# Patient Record
Sex: MALE | Race: WHITE | NOT HISPANIC OR LATINO | Employment: OTHER | URBAN - METROPOLITAN AREA
[De-identification: names, ages, dates, MRNs, and addresses within clinical notes are randomized per-mention and may not be internally consistent; named-entity substitution may affect disease eponyms.]

---

## 2017-01-10 ENCOUNTER — APPOINTMENT (OUTPATIENT)
Dept: LAB | Facility: CLINIC | Age: 72
End: 2017-01-10
Payer: MEDICARE

## 2017-01-10 ENCOUNTER — GENERIC CONVERSION - ENCOUNTER (OUTPATIENT)
Dept: OTHER | Facility: OTHER | Age: 72
End: 2017-01-10

## 2017-01-10 DIAGNOSIS — E87.6 HYPOKALEMIA: ICD-10-CM

## 2017-01-10 LAB — POTASSIUM SERPL-SCNC: 3.6 MMOL/L (ref 3.5–5.3)

## 2017-01-10 PROCEDURE — 84132 ASSAY OF SERUM POTASSIUM: CPT

## 2017-01-10 PROCEDURE — 36415 COLL VENOUS BLD VENIPUNCTURE: CPT

## 2017-01-19 ENCOUNTER — ALLSCRIPTS OFFICE VISIT (OUTPATIENT)
Dept: OTHER | Facility: OTHER | Age: 72
End: 2017-01-19

## 2017-05-24 ENCOUNTER — HOSPITAL ENCOUNTER (OUTPATIENT)
Dept: NON INVASIVE DIAGNOSTICS | Facility: CLINIC | Age: 72
Discharge: HOME | End: 2017-05-24
Payer: MEDICARE

## 2017-05-24 DIAGNOSIS — I65.21 OCCLUSION AND STENOSIS OF RIGHT CAROTID ARTERY: ICD-10-CM

## 2017-05-24 PROCEDURE — 93880 EXTRACRANIAL BILAT STUDY: CPT

## 2017-07-21 ENCOUNTER — GENERIC CONVERSION - ENCOUNTER (OUTPATIENT)
Dept: OTHER | Facility: OTHER | Age: 72
End: 2017-07-21

## 2017-07-21 ENCOUNTER — TRANSCRIBE ORDERS (OUTPATIENT)
Dept: LAB | Facility: CLINIC | Age: 72
End: 2017-07-21

## 2017-07-21 ENCOUNTER — APPOINTMENT (OUTPATIENT)
Dept: LAB | Facility: CLINIC | Age: 72
End: 2017-07-21
Payer: MEDICARE

## 2017-07-21 DIAGNOSIS — I10 ESSENTIAL (PRIMARY) HYPERTENSION: ICD-10-CM

## 2017-07-21 DIAGNOSIS — I10 ESSENTIAL HYPERTENSION, MALIGNANT: Primary | ICD-10-CM

## 2017-07-21 DIAGNOSIS — I10 ESSENTIAL HYPERTENSION, MALIGNANT: ICD-10-CM

## 2017-07-21 DIAGNOSIS — E87.6 HYPOPOTASSEMIA: ICD-10-CM

## 2017-07-21 DIAGNOSIS — E87.6 HYPOKALEMIA: ICD-10-CM

## 2017-07-21 LAB
ANION GAP SERPL CALCULATED.3IONS-SCNC: 7 MMOL/L (ref 4–13)
BUN SERPL-MCNC: 22 MG/DL (ref 5–25)
CALCIUM SERPL-MCNC: 9.2 MG/DL (ref 8.3–10.1)
CHLORIDE SERPL-SCNC: 103 MMOL/L (ref 100–108)
CO2 SERPL-SCNC: 29 MMOL/L (ref 21–32)
CREAT SERPL-MCNC: 0.98 MG/DL (ref 0.6–1.3)
GFR SERPL CREATININE-BSD FRML MDRD: >60 ML/MIN/1.73SQ M
GLUCOSE P FAST SERPL-MCNC: 86 MG/DL (ref 65–99)
POTASSIUM SERPL-SCNC: 3.7 MMOL/L (ref 3.5–5.3)
SODIUM SERPL-SCNC: 139 MMOL/L (ref 136–145)

## 2017-07-21 PROCEDURE — 80048 BASIC METABOLIC PNL TOTAL CA: CPT

## 2017-08-03 ENCOUNTER — ALLSCRIPTS OFFICE VISIT (OUTPATIENT)
Dept: OTHER | Facility: OTHER | Age: 72
End: 2017-08-03

## 2017-09-19 ENCOUNTER — ALLSCRIPTS OFFICE VISIT (OUTPATIENT)
Dept: OTHER | Facility: OTHER | Age: 72
End: 2017-09-19

## 2017-10-04 ENCOUNTER — GENERIC CONVERSION - ENCOUNTER (OUTPATIENT)
Dept: OTHER | Facility: OTHER | Age: 72
End: 2017-10-04

## 2017-12-04 DIAGNOSIS — E78.00 PURE HYPERCHOLESTEROLEMIA: ICD-10-CM

## 2017-12-04 DIAGNOSIS — Z12.5 ENCOUNTER FOR SCREENING FOR MALIGNANT NEOPLASM OF PROSTATE: ICD-10-CM

## 2017-12-04 DIAGNOSIS — I10 ESSENTIAL (PRIMARY) HYPERTENSION: ICD-10-CM

## 2017-12-04 DIAGNOSIS — I65.21 OCCLUSION AND STENOSIS OF RIGHT CAROTID ARTERY: ICD-10-CM

## 2018-01-09 NOTE — RESULT NOTES
Verified Results  (1) BASIC METABOLIC PROFILE [id number] 07:35AM [employer] Order Number: [id number]     Test Name Result Flag Reference   SODIUM 139 mmol/L  136-145   POTASSIUM 3 7 mmol/L  3 5-5 3   CHLORIDE 103 mmol/L  100-108   CARBON DIOXIDE 29 mmol/L  21-32   ANION GAP (CALC) 7 mmol/L  4-13   BLOOD UREA NITROGEN 22 mg/dL  5-25   CREATININE 0 98 mg/dL  0 60-1 30   Standardized to IDMS reference method   CALCIUM 9 2 mg/dL  8 3-10 1   eGFR Non-African American      >60 0 ml/min/1 73sq m   [facility] Disease Education Program recommendations are as follows:  GFR calculation is accurate only with a steady state creatinine  Chronic Kidney disease less than 60 ml/min/1 73 sq  meters  Kidney failure less than 15 ml/min/1 73 sq  meters     GLUCOSE FASTING 86 mg/dL  65-99

## 2018-01-10 NOTE — PROGRESS NOTES
Chief Complaint  Patient here for High dose Fluzone in left arm      Active Problems    1  Benign essential hypertension (401 1) (I10)   2  Carotid stenosis, asymptomatic, right (433 10) (I65 21)   3  Colon cancer screening (V76 51) (Z12 11)   4  Elevated BP (401 9) (I10)   5  First degree AV block (426 11) (I44 0)   6  History of allergy (V15 09) (Z88 9)   7  Hypercholesterolemia (272 0) (E78 0)   8  Need for influenza vaccination (V04 81) (Z23)   9  Obesity (278 00) (E66 9)   10  Osteoarthritis (715 90) (M19 90)    Current Meds   1  Aspirin 81 MG TABS; TAKE 1 TABLET DAILY; Therapy: 62OTS4199 to (Evaluate:02Apr2016) Recorded   2  Chlorthalidone 25 MG Oral Tablet; TAKE 1 TABLET DAILY; Therapy: 74RFU1349 to (Evaluate:10Jan2017)  Requested for: 97ZKZ2699; Last   Rx:35Nvx3490 Ordered   3  Losartan Potassium 50 MG Oral Tablet; take 1 tablet every day; Therapy: 36SWY2148 to (Terie Salvage)  Requested for: 02MJD8224; Last   Rx:25Sli3126 Ordered   4  Simvastatin 40 MG Oral Tablet; take 1 tablet by mouth every day; Therapy: 32GTO7456 to (Evaluate:29Oct2016)  Requested for: 17YHB9931; Last   DM:91HSU6191 Ordered    Allergies    1  No Known Drug Allergies    Plan  Need for influenza vaccination    · Fluzone High-Dose 0 5 ML Intramuscular Suspension Prefilled Syringe    Future Appointments    Date/Time Provider Specialty Site   01/19/2017 01:00 PM GRAY Ghotra  Family Medicine [facility]     Signatures   Electronically signed by : Belvia Spurling, ; Sep  9 2016  8:50AM EST                       (Author)    Electronically signed by :  Mordechai Nyhan, MD; Sep  9 2016 10:14AM EST                       (Review)

## 2018-01-11 NOTE — RESULT NOTES
Message   Please call - I have tried a few times and missed      Carotids show some narrowing but not significant   He is on aspirin, statin, and BP is good   Nothing else to do at present   Will reveiw again at his next visit   Will recheck again in a couple of years     Verified Results  VAS CAROTID COMPLETE STUDY 76Gaj8123 08:27AM Gurpreet Pritchett Order Number: [id number]   Performing Comments: He had < 50% right occlusion on screening last year and it was recommended he have a complete study in 1 year   - Patient Instructions: To schedule this appointment, please contact Central Scheduling at [phone]   Order Number: [id number]   Performing Comments: He had < 50% right occlusion on screening last year and it was recommended he have a complete study in 1 year   - Patient Instructions: To schedule this appointment, please contact Central Scheduling at [phone]  Test Name Result Flag Reference   VAS CAROTID COMPLETE STUDY (Report)     [facility] REPORT   CLINICAL:   Indications: Carotid disease w/o CVA [I65 29]  follow up from screening denies   current symptoms   Operative History   no vascular surgery   Risk Factors   The patient has history of HTN and hyperlipidemia  Clinical   Right Pressure: 140/70 mm Hg, Left Pressure: 140/70 mm Hg  FINDINGS:      Right    Impression PSV EDV (cm/s) Ratio    Dist  ICA         55     20  0 66    Mid  ICA         61     25  0 72    Prox  ICA  1 - 49%   59     21  0 71    Dist CCA         84     23        Mid CCA          84     25  0 98    Prox CCA         85     22        Ext Carotid        82     15  0 98    Prox Vert         44     15        Subclavian        113      0           Left     Impression PSV EDV (cm/s) Ratio    Dist  ICA         68     30  0 86    Mid  ICA         71     25  0 91    Prox   ICA  1 - 49%   75     20  0 95    Dist CCA         72     22        Mid CCA          78     24  0 94    Prox CCA         83     21 Ext Carotid        84     14  1 08    Prox Vert         40     12        Subclavian        162      0                 CONCLUSION:   Impression   RIGHT:   There is <50% stenosis noted in the internal carotid artery  Plaque is   heterogenous and smooth  Vertebral artery flow is antegrade  There is no significant subclavian artery   disease  LEFT:   There is <50% stenosis noted in the internal carotid artery  Plaque is   heterogenous and smooth  Vertebral artery flow is antegrade  There is no significant subclavian artery   disease  Compared to previous study on 7/14/2015 , there is no progression of arterial   disease in the carotid arteries bilaterally  Internal carotid artery stenosis determination by consensus criteria from:   Alena Malhotra et al  Carotid Artery Stenosis: Gray-Scale and Doppler US Diagnosis   - Society of Radiologists in [address] [place] [address], Radiology 2003;   [phone]        SIGNATURE:   Electronically Signed by: Sue Jackson MD on 2016-08-13 12:40:45 PM

## 2018-01-11 NOTE — RESULT NOTES
Message   Labs generally OK except slightly low potassium level which is from his water pill   Increase dietart potassium and recheck his potassium level before visit 1/19/17     Verified Results  (1) COMPREHENSIVE METABOLIC PANEL [id number] 14:23SS Harshil Gutierrez Order Number: [id number]     Test Name Result Flag Reference   GLUCOSE,RANDM 83 mg/dL     If the patient is fasting, the ADA then defines impaired fasting glucose as > 100 mg/dL and diabetes as > or equal to 123 mg/dL  SODIUM 143 mmol/L  136-145   POTASSIUM 3 4 mmol/L L 3 5-5 3   CHLORIDE 104 mmol/L  100-108   CARBON DIOXIDE 32 mmol/L  21-32   ANION GAP (CALC) 7 mmol/L  4-13   BLOOD UREA NITROGEN 18 mg/dL  5-25   CREATININE 1 09 mg/dL  0 60-1 30   Standardized to IDMS reference method   CALCIUM 9 1 mg/dL  8 3-10 1   BILI, TOTAL 0 53 mg/dL  0 20-1 00   ALK PHOSPHATAS 58 U/L     ALT (SGPT) 27 U/L  12-78   AST(SGOT) 16 U/L  5-45   ALBUMIN 3 9 g/dL  3 5-5 0   TOTAL PROTEIN 7 3 g/dL  6 4-8 2   eGFR Non-African American      >60 0 ml/min/1 73sq [facility] Disease Education Program recommendations are as follows:  GFR calculation is accurate only with a steady state creatinine  Chronic Kidney disease less than 60 ml/min/1 73 sq  meters  Kidney failure less than 15 ml/min/1 73 sq  meters       (1) CBC/PLT/DIFF [id number] 07:32AM Harshil Gutierrez Order Number: [id number]     Test Name Result Flag Reference   WBC COUNT 6 14 Thousand/uL  4 31-10 16   RBC COUNT 4 76 Million/uL  3 88-5 62   HEMOGLOBIN 14 1 g/dL  12 0-17 0   HEMATOCRIT 42 2 %  36 5-49 3   MCV 89 fL  82-98   MCH 29 6 pg  26 8-34 3   MCHC 33 4 g/dL  31 4-37 4   RDW 12 4 %  11 6-15 1   MPV 9 7 fL  8 9-12 7   PLATELET COUNT 491 Thousands/uL  149-390   nRBC AUTOMATED 0 /100 WBCs     NEUTROPHILS RELATIVE PERCENT 65 %  43-75   LYMPHOCYTES RELATIVE PERCENT 23 %  14-44   MONOCYTES RELATIVE PERCENT 7 %  4-12   EOSINOPHILS RELATIVE PERCENT 5 %  0-6   BASOPHILS RELATIVE PERCENT 0 %  0-1   NEUTROPHILS ABSOLUTE COUNT 3 98 Thousands/?L  1 85-7 62   LYMPHOCYTES ABSOLUTE COUNT 1 39 Thousands/?L  0 60-4 47   MONOCYTES ABSOLUTE COUNT 0 43 Thousand/?L  0 17-1 22   EOSINOPHILS ABSOLUTE COUNT 0 31 Thousand/?L  0 00-0 61   BASOPHILS ABSOLUTE COUNT 0 02 Thousands/?L  0 00-0 10     (1) LIPID PANEL, FASTING [id number] 07:32AM [employer] Order Number: [id number]     Test Name Result Flag Reference   CHOLESTEROL 167 mg/dL     HDL,DIRECT 62 mg/dL H 40-60   Specimen collection should occur prior to Metamizole administration due to the potential for falsely depressed results  LDL CHOLESTEROL CALCULATED 83 mg/dL  0-100   Triglyceride:         Normal              <150 mg/dl       Borderline High    150-199 mg/dl       High               200-499 mg/dl       Very High          >499 mg/dl  Cholesterol:         Desirable        <200 mg/dl      Borderline High  200-239 mg/dl      High             >239 mg/dl  HDL Cholesterol:        High    >59 mg/dL      Low     <41 mg/dL  LDL CALCULATED:    This screening LDL is a calculated result  It does not have the accuracy of the Direct Measured LDL in the monitoring of patients with hyperlipidemia and/or statin therapy  Direct Measure LDL (TJA435) must be ordered separately in these patients  TRIGLYCERIDES 109 mg/dL  <=150   Specimen collection should occur prior to N-Acetylcysteine or Metamizole administration due to the potential for falsely depressed results       (1) MAGNESIUM 12Dec2016 07:32AM [employer] Order Number: [id number]     Test Name Result Flag Reference   MAGNESIUM 2 3 mg/dL  1 6-2 6     (1) URINALYSIS (will reflex a microscopy if leukocytes, occult blood, protein or nitrites are not within normal limits) [id number] 07:32AM [employer] Order Number: [id number]     Test Name Result Flag Reference   COLOR Dk Yellow     CLARITY Cloudy     SPECIFIC GRAVITY UA 1 020  1 003-1 030 PH UA 5 0  4 5-8 0   LEUKOCYTE ESTERASE UA Negative  Negative   NITRITE UA Negative  Negative   PROTEIN UA Negative mg/dl  Negative   GLUCOSE UA Negative mg/dl  Negative   KETONES UA Negative mg/dl  Negative   UROBILINOGEN UA 0 2 E U /dl  0 2, 1 0 E U /dl   BILIRUBIN UA Negative  Negative   BLOOD UA Negative  Negative       Plan  Hypokalemia    · (1) POTASSIUM; Status:Active;  Requested QBT:24ATY5600;

## 2018-01-13 VITALS
HEIGHT: 67 IN | SYSTOLIC BLOOD PRESSURE: 130 MMHG | WEIGHT: 222.13 LBS | HEART RATE: 80 BPM | DIASTOLIC BLOOD PRESSURE: 72 MMHG | TEMPERATURE: 98 F | BODY MASS INDEX: 34.87 KG/M2 | RESPIRATION RATE: 16 BRPM

## 2018-01-13 VITALS
SYSTOLIC BLOOD PRESSURE: 124 MMHG | RESPIRATION RATE: 16 BRPM | DIASTOLIC BLOOD PRESSURE: 80 MMHG | HEART RATE: 84 BPM | WEIGHT: 223.2 LBS | HEIGHT: 67 IN | BODY MASS INDEX: 35.03 KG/M2 | TEMPERATURE: 98 F

## 2018-01-14 VITALS
DIASTOLIC BLOOD PRESSURE: 72 MMHG | WEIGHT: 222 LBS | TEMPERATURE: 98.1 F | HEART RATE: 74 BPM | BODY MASS INDEX: 34.84 KG/M2 | SYSTOLIC BLOOD PRESSURE: 126 MMHG | HEIGHT: 67 IN | RESPIRATION RATE: 16 BRPM

## 2018-01-14 NOTE — RESULT NOTES
Verified Results  (1) POTASSIUM 60TIS7654 09:19AM [email or web address] Order Number: [id number]     Test Name Result Flag Reference   POTASSIUM 3 6 mmol/L  3 5-5 3

## 2018-02-03 ENCOUNTER — APPOINTMENT (OUTPATIENT)
Dept: LAB | Facility: CLINIC | Age: 73
End: 2018-02-03
Payer: MEDICARE

## 2018-02-03 DIAGNOSIS — E78.00 PURE HYPERCHOLESTEROLEMIA: ICD-10-CM

## 2018-02-03 DIAGNOSIS — I10 ESSENTIAL (PRIMARY) HYPERTENSION: ICD-10-CM

## 2018-02-03 DIAGNOSIS — Z12.5 ENCOUNTER FOR SCREENING FOR MALIGNANT NEOPLASM OF PROSTATE: ICD-10-CM

## 2018-02-03 DIAGNOSIS — I65.21 OCCLUSION AND STENOSIS OF RIGHT CAROTID ARTERY: ICD-10-CM

## 2018-02-03 LAB
ALBUMIN SERPL BCP-MCNC: 4 G/DL (ref 3.5–5)
ALP SERPL-CCNC: 51 U/L (ref 46–116)
ALT SERPL W P-5'-P-CCNC: 28 U/L (ref 12–78)
ANION GAP SERPL CALCULATED.3IONS-SCNC: 10 MMOL/L (ref 4–13)
AST SERPL W P-5'-P-CCNC: 19 U/L (ref 5–45)
BILIRUB SERPL-MCNC: 0.49 MG/DL (ref 0.2–1)
BILIRUB UR QL STRIP: NEGATIVE
BUN SERPL-MCNC: 17 MG/DL (ref 5–25)
CALCIUM SERPL-MCNC: 8.8 MG/DL (ref 8.3–10.1)
CHLORIDE SERPL-SCNC: 103 MMOL/L (ref 100–108)
CHOLEST SERPL-MCNC: 140 MG/DL (ref 50–200)
CLARITY UR: CLEAR
CO2 SERPL-SCNC: 30 MMOL/L (ref 21–32)
COLOR UR: NORMAL
CREAT SERPL-MCNC: 1.02 MG/DL (ref 0.6–1.3)
ERYTHROCYTE [DISTWIDTH] IN BLOOD BY AUTOMATED COUNT: 12.7 % (ref 11.6–15.1)
GFR SERPL CREATININE-BSD FRML MDRD: 73 ML/MIN/1.73SQ M
GLUCOSE P FAST SERPL-MCNC: 78 MG/DL (ref 65–99)
GLUCOSE UR STRIP-MCNC: NEGATIVE MG/DL
HCT VFR BLD AUTO: 39.9 % (ref 36.5–49.3)
HDLC SERPL-MCNC: 57 MG/DL (ref 40–60)
HGB BLD-MCNC: 13.2 G/DL (ref 12–17)
HGB UR QL STRIP.AUTO: NEGATIVE
KETONES UR STRIP-MCNC: NEGATIVE MG/DL
LDLC SERPL CALC-MCNC: 68 MG/DL (ref 0–100)
LEUKOCYTE ESTERASE UR QL STRIP: NEGATIVE
MCH RBC QN AUTO: 30.1 PG (ref 26.8–34.3)
MCHC RBC AUTO-ENTMCNC: 33.1 G/DL (ref 31.4–37.4)
MCV RBC AUTO: 91 FL (ref 82–98)
NITRITE UR QL STRIP: NEGATIVE
PH UR STRIP.AUTO: 6 [PH] (ref 4.5–8)
PLATELET # BLD AUTO: 240 THOUSANDS/UL (ref 149–390)
PMV BLD AUTO: 9.4 FL (ref 8.9–12.7)
POTASSIUM SERPL-SCNC: 3.7 MMOL/L (ref 3.5–5.3)
PROT SERPL-MCNC: 7.1 G/DL (ref 6.4–8.2)
PROT UR STRIP-MCNC: NEGATIVE MG/DL
PSA SERPL-MCNC: 1.7 NG/ML (ref 0–4)
RBC # BLD AUTO: 4.38 MILLION/UL (ref 3.88–5.62)
SODIUM SERPL-SCNC: 143 MMOL/L (ref 136–145)
SP GR UR STRIP.AUTO: 1.02 (ref 1–1.03)
TRIGL SERPL-MCNC: 77 MG/DL
UROBILINOGEN UR QL STRIP.AUTO: 1 E.U./DL
WBC # BLD AUTO: 5.44 THOUSAND/UL (ref 4.31–10.16)

## 2018-02-03 PROCEDURE — 81003 URINALYSIS AUTO W/O SCOPE: CPT

## 2018-02-03 PROCEDURE — 36415 COLL VENOUS BLD VENIPUNCTURE: CPT

## 2018-02-03 PROCEDURE — G0103 PSA SCREENING: HCPCS

## 2018-02-03 PROCEDURE — 80061 LIPID PANEL: CPT

## 2018-02-03 PROCEDURE — 80053 COMPREHEN METABOLIC PANEL: CPT

## 2018-02-03 PROCEDURE — 85027 COMPLETE CBC AUTOMATED: CPT

## 2018-02-20 RX ORDER — SIMVASTATIN 40 MG
1 TABLET ORAL DAILY
COMMUNITY
Start: 2012-03-22 | End: 2018-04-25 | Stop reason: SDUPTHER

## 2018-02-20 RX ORDER — CHLORTHALIDONE 25 MG/1
1 TABLET ORAL DAILY
COMMUNITY
Start: 2016-07-14 | End: 2018-07-12 | Stop reason: SDUPTHER

## 2018-02-20 RX ORDER — LOSARTAN POTASSIUM 50 MG/1
1 TABLET ORAL DAILY
COMMUNITY
Start: 2014-03-27 | End: 2019-02-15 | Stop reason: SDUPTHER

## 2018-02-21 PROBLEM — H26.9 CATARACTS, BILATERAL: Status: ACTIVE | Noted: 2017-09-19

## 2018-02-21 PROBLEM — L82.1 SEBORRHEIC KERATOSES: Status: ACTIVE | Noted: 2017-01-19

## 2018-02-21 PROBLEM — G89.29 CHRONIC RIGHT SHOULDER PAIN: Status: ACTIVE | Noted: 2017-01-19

## 2018-02-21 PROBLEM — M25.511 CHRONIC RIGHT SHOULDER PAIN: Status: ACTIVE | Noted: 2017-01-19

## 2018-02-22 ENCOUNTER — OFFICE VISIT (OUTPATIENT)
Dept: FAMILY MEDICINE CLINIC | Facility: CLINIC | Age: 73
End: 2018-02-22
Payer: MEDICARE

## 2018-02-22 VITALS
HEIGHT: 67 IN | TEMPERATURE: 97 F | SYSTOLIC BLOOD PRESSURE: 126 MMHG | BODY MASS INDEX: 35.19 KG/M2 | HEART RATE: 60 BPM | WEIGHT: 224.2 LBS | RESPIRATION RATE: 16 BRPM | DIASTOLIC BLOOD PRESSURE: 78 MMHG

## 2018-02-22 DIAGNOSIS — I10 BENIGN ESSENTIAL HYPERTENSION: Primary | ICD-10-CM

## 2018-02-22 DIAGNOSIS — L82.1 SEBORRHEIC KERATOSIS: ICD-10-CM

## 2018-02-22 DIAGNOSIS — I65.21 CAROTID STENOSIS, ASYMPTOMATIC, RIGHT: ICD-10-CM

## 2018-02-22 DIAGNOSIS — E66.09 CLASS 2 OBESITY DUE TO EXCESS CALORIES WITHOUT SERIOUS COMORBIDITY WITH BODY MASS INDEX (BMI) OF 35.0 TO 35.9 IN ADULT: ICD-10-CM

## 2018-02-22 DIAGNOSIS — E78.00 HYPERCHOLESTEROLEMIA: ICD-10-CM

## 2018-02-22 PROCEDURE — 99213 OFFICE O/P EST LOW 20 MIN: CPT | Performed by: FAMILY MEDICINE

## 2018-02-22 NOTE — ASSESSMENT & PLAN NOTE
His blood pressure is well controlled on current medications  Recent blood work showed normal electrolytes and renal function  Urinalysis was normal     He had an EKG done last fall preoperatively which was not remarkable

## 2018-02-22 NOTE — ASSESSMENT & PLAN NOTE
He understands that his weight is not optimal and will try to watch his diet and be active with a goal to lose maybe 10 lb by the time I see him next

## 2018-02-22 NOTE — PROGRESS NOTES
Assessment/Plan:    He is up-to-date on his immunizations  He has had a zoster vaccine in the past but I recommended he may consider the new 1 when it is available as a booster  Reviewed recent labs today and all were good    He had a colonoscopy he thinks a few years ago although I am having trouble finding the record of this at current time we will look for this and update when he is due next  I did go back and review his [employer] record and found that he had a colonoscopy in 2014 with Dr Alfonso Cedillo which was essentially normal   It would appear by basic criteria that he would not need 1 for 10 years but at that time the recommendation was still every 5 years  We will have him check with gastroenterology in 2019 to see if he needs 1 at that time or the tenure psych was appropriate  Next time he is back will do a little bit more physical exam including abdomen  Benign essential hypertension  His blood pressure is well controlled on current medications  Recent blood work showed normal electrolytes and renal function  Urinalysis was normal     Carotid stenosis, asymptomatic, right  There is no carotid bruit noted on today's visit  He had carotid Dopplers done last fall which showed stable findings and no significant disease  He takes 1 aspirin low-dose daily    Hypercholesterolemia  He is on statin therapy moderate dose  His recent blood studies showed lipids to be optimal  Continue same and repeat labs in 1 year    Seborrheic keratoses  These appear to be benign and I told him we would keep an eye on him and he should just be alert for any changes particularly if they bleed and not showed nonhealing    Obesity  He understands that his weight is not optimal and will try to watch his diet and be active with a goal to lose maybe 10 lb by the time I see him next      Cataracts, bilateral  He had his surgery last fall with good results correcting on not only the vision deficit from the cataracts but also has astigmatism and he now wears her reading glasses as needed  Diagnoses and all orders for this visit:    Benign essential hypertension    Carotid stenosis, asymptomatic, right    Hypercholesterolemia    Seborrheic keratosis    Class 2 obesity due to excess calories without serious comorbidity with body mass index (BMI) of 35 0 to 35 9 in adult          Subjective:      Patient ID: Ralph Montelongo is a 67 y o  male  HPI    The following portions of the patient's history were reviewed and updated as appropriate: allergies, current medications, past family history, past medical history, past social history, past surgical history and problem list     Review of Systems   HENT: Positive for hearing loss (some decrease but not impacting him significantly - does have issues when background noise)  Negative for dental problem  Tinnitus: right greater than left  Eyes:        Had bilateral cataract surgery last fall - also fixed astigmatism  Only needs reading glasses prn  Respiratory: Negative for cough, chest tightness and shortness of breath  Cardiovascular: Negative for chest pain, palpitations and leg swelling  Gastrointestinal:        BM daily  No dyspepsia or GERD except occasional related to foods   Genitourinary:        No issues   Musculoskeletal: Positive for back pain (low back pain on ocassion )  Negative for arthralgias and gait problem  Skin: Negative for rash  Has a spot on his back wife noticed   Neurological: Negative for dizziness and headaches  Psychiatric/Behavioral: Negative for confusion and dysphoric mood  The patient is not nervous/anxious  Objective:      /78 (BP Location: Right arm, Patient Position: Sitting, Cuff Size: Large)   Pulse 60   Temp (!) 97 °F (36 1 °C) (Tympanic)   Resp 16   Ht 5' 7" (1 702 m)   Wt 102 kg (224 lb 3 2 oz)   BMI 35 11 kg/m²          Physical Exam   Constitutional: He is oriented to person, place, and time   He appears well-developed and well-nourished  Neck: No JVD present  No tracheal deviation present  Cardiovascular: Normal rate, regular rhythm and normal heart sounds  No murmur heard  Musculoskeletal: He exhibits no edema  Lymphadenopathy:     He has no cervical adenopathy  Neurological: He is oriented to person, place, and time  He has normal reflexes  Skin: No rash noted  There are 2 seborrheic keratosis on his back (one mid and the other left upper) that are medium brown, slightly raised, and relatively small and benign appearing   Psychiatric: He has a normal mood and affect  His behavior is normal  Judgment and thought content normal    Nursing note and vitals reviewed

## 2018-02-22 NOTE — ASSESSMENT & PLAN NOTE
These appear to be benign and I told him we would keep an eye on him and he should just be alert for any changes particularly if they bleed and not showed nonhealing

## 2018-02-22 NOTE — ASSESSMENT & PLAN NOTE
There is no carotid bruit noted on today's visit  He had carotid Dopplers done last fall which showed stable findings and no significant disease      He takes 1 aspirin low-dose daily

## 2018-02-22 NOTE — ASSESSMENT & PLAN NOTE
He had his surgery last fall with good results correcting on not only the vision deficit from the cataracts but also has astigmatism and he now wears her reading glasses as needed

## 2018-04-25 DIAGNOSIS — E78.00 HYPERCHOLESTEROLEMIA: Primary | ICD-10-CM

## 2018-04-26 RX ORDER — SIMVASTATIN 40 MG
40 TABLET ORAL DAILY
Qty: 90 TABLET | Refills: 3 | Status: SHIPPED | OUTPATIENT
Start: 2018-04-26 | End: 2018-08-30

## 2018-05-13 ENCOUNTER — OFFICE VISIT (OUTPATIENT)
Dept: URGENT CARE | Facility: CLINIC | Age: 73
End: 2018-05-13
Payer: MEDICARE

## 2018-05-13 VITALS
DIASTOLIC BLOOD PRESSURE: 84 MMHG | SYSTOLIC BLOOD PRESSURE: 144 MMHG | HEART RATE: 105 BPM | RESPIRATION RATE: 16 BRPM | WEIGHT: 218 LBS | OXYGEN SATURATION: 96 % | BODY MASS INDEX: 32.29 KG/M2 | HEIGHT: 69 IN | TEMPERATURE: 100.3 F

## 2018-05-13 DIAGNOSIS — J06.9 UPPER RESPIRATORY TRACT INFECTION, UNSPECIFIED TYPE: Primary | ICD-10-CM

## 2018-05-13 PROCEDURE — 99213 OFFICE O/P EST LOW 20 MIN: CPT | Performed by: PREVENTIVE MEDICINE

## 2018-05-13 PROCEDURE — G0463 HOSPITAL OUTPT CLINIC VISIT: HCPCS | Performed by: PREVENTIVE MEDICINE

## 2018-05-13 NOTE — PROGRESS NOTES
[facility]        NAME: Shani Nielson is a 67 y o  male  : 1945    MRN: [id number]  DATE: May 13, 2018  TIME: 2:20 PM    Assessment and Plan   Upper respiratory tract infection, unspecified type [J06 9]  1  Upper respiratory tract infection, unspecified type           Patient Instructions       Follow up with PCP in 3-5 days  Proceed to  ER if symptoms worsen  Chief Complaint     Chief Complaint   Patient presents with    Cough     Started friday, was out weed whacking  Allergies acting up, SOB w/exertion, eyes red (recent cataract sx), runny nose, sore throat, headache r/t coughing up clear mucous  Vomitted grape juice         History of Present Illness       Was weed whacking yesterday after weed whacking begun to cough extensively mild phlegm, and low-grade fever  Review of Systems   Review of Systems   Constitutional: Positive for fever  HENT: Positive for congestion  Respiratory: Positive for cough            Current Medications       Current Outpatient Prescriptions:     aspirin 81 MG tablet, Take 1 tablet by mouth daily, Disp: , Rfl:     chlorthalidone 25 mg tablet, Take 1 tablet by mouth daily, Disp: , Rfl:     losartan (COZAAR) 50 mg tablet, Take 1 tablet by mouth daily, Disp: , Rfl:     simvastatin (ZOCOR) 40 mg tablet, Take 1 tablet (40 mg total) by mouth daily for 90 days, Disp: 90 tablet, Rfl: 3    Current Allergies     Allergies as of 2018 - Reviewed 2018   Allergen Reaction Noted    Seasonal ic [cholestatin]  2018            The following portions of the patient's history were reviewed and updated as appropriate: allergies, current medications, past family history, past medical history, past social history, past surgical history and problem list      Past Medical History:   Diagnosis Date    Allergy     last assessed - [id number]    Dyspepsia     last assessed - 76Vbw9255    Hyperlipidemia     Hypertension     Onychomycosis     last assessed - 04IUP3724    Plantar fasciitis        Past Surgical History:   Procedure Laterality Date    COLONOSCOPY  2004    complete colonoscopy    NEUROPLASTY / TRANSPOSITION MEDIAN NERVE AT CARPAL TUNNEL BILATERAL  2002    ROTATOR CUFF REPAIR Bilateral     Right - Resolved - Aug 2004; Left - Resolved - Feb 2006       Family History   Problem Relation Age of Onset    Crohn's disease Mother          Medications have been verified  Objective   /84   Pulse 105   Temp 100 3 °F (37 9 °C)   Resp 16   Ht 5' 9" (1 753 m)   Wt 98 9 kg (218 lb)   SpO2 96%   BMI 32 19 kg/m²        Physical Exam     Physical Exam   HENT:   Right Ear: External ear normal    Left Ear: External ear normal    Mouth/Throat: Oropharynx is clear and moist    Cardiovascular: Normal heart sounds  Pulmonary/Chest: Breath sounds normal    Lymphadenopathy:     He has no cervical adenopathy

## 2018-05-17 ENCOUNTER — OFFICE VISIT (OUTPATIENT)
Dept: FAMILY MEDICINE CLINIC | Facility: CLINIC | Age: 73
End: 2018-05-17
Payer: MEDICARE

## 2018-05-17 VITALS
BODY MASS INDEX: 34 KG/M2 | SYSTOLIC BLOOD PRESSURE: 140 MMHG | RESPIRATION RATE: 16 BRPM | TEMPERATURE: 98.1 F | HEIGHT: 67 IN | OXYGEN SATURATION: 97 % | HEART RATE: 72 BPM | WEIGHT: 216.6 LBS | DIASTOLIC BLOOD PRESSURE: 84 MMHG

## 2018-05-17 DIAGNOSIS — J00 ACUTE NASOPHARYNGITIS: ICD-10-CM

## 2018-05-17 DIAGNOSIS — R05.9 COUGH: Primary | ICD-10-CM

## 2018-05-17 DIAGNOSIS — I10 BENIGN ESSENTIAL HYPERTENSION: ICD-10-CM

## 2018-05-17 PROCEDURE — 99213 OFFICE O/P EST LOW 20 MIN: CPT | Performed by: FAMILY MEDICINE

## 2018-05-17 RX ORDER — MULTIVIT-MIN/IRON FUM/FOLIC AC 7.5 MG-4
1 TABLET ORAL DAILY
COMMUNITY

## 2018-05-17 RX ORDER — CHLORAL HYDRATE 500 MG
1000 CAPSULE ORAL DAILY
COMMUNITY

## 2018-05-17 RX ORDER — MELATONIN
500 DAILY
COMMUNITY

## 2018-05-17 RX ORDER — RIBOFLAVIN (VITAMIN B2) 100 MG
100 TABLET ORAL DAILY
COMMUNITY

## 2018-05-17 RX ORDER — PROMETHAZINE HYDROCHLORIDE AND CODEINE PHOSPHATE 6.25; 1 MG/5ML; MG/5ML
5 SYRUP ORAL EVERY 4 HOURS PRN
Qty: 120 ML | Refills: 0 | Status: SHIPPED | OUTPATIENT
Start: 2018-05-17 | End: 2018-08-30

## 2018-05-17 RX ORDER — LANOLIN ALCOHOL/MO/W.PET/CERES
1 CREAM (GRAM) TOPICAL 3 TIMES DAILY
COMMUNITY
End: 2019-09-12 | Stop reason: SDUPTHER

## 2018-05-17 NOTE — ASSESSMENT & PLAN NOTE
Blood pressure is slightly elevated to tight compared to his usual but no change in medication is indicated as I believe this is just due to his acute illness and coughing

## 2018-05-17 NOTE — PROGRESS NOTES
Assessment/Plan:    I discussed with him that no antibiotics would be indicated based on his clinical findings and symptoms and he understood this  I suggested that we try some different medication combination to control his symptoms and suggested the followin  Coricidin D q12hrs prn congestion  2  Delsym q12hrs prn cough    I also suggested that we could give him a codeine-containing cough medicine to use particularly at night or during the day if he is not going to be driving or doing other activities that might put him at risk  He will call if he is not getting better has progressive or changing symptoms    Benign essential hypertension  Blood pressure is slightly elevated to tight compared to his usual but no change in medication is indicated as I believe this is just due to his acute illness and coughing       Diagnoses and all orders for this visit:    Cough  -     promethazine-codeine (PHENERGAN WITH CODEINE) 6 25-10 mg/5 mL syrup; Take 5 mL by mouth every 4 (four) hours as needed for cough    Acute nasopharyngitis    Benign essential hypertension    Other orders  -     Multiple Vitamins-Minerals (MULTIVITAMIN WITH MINERALS) tablet; Take 1 tablet by mouth daily  -     Ascorbic Acid (VITAMIN C) 100 MG tablet; Take 100 mg by mouth daily  -     Omega-3 Fatty Acids (FISH OIL) 1,000 mg; Take 1,000 mg by mouth daily  -     glucosamine-chondroitin 500-400 MG tablet; Take 1 tablet by mouth 3 (three) times a day  -     cholecalciferol (VITAMIN D3) 1,000 units tablet; Take 500 Units by mouth daily          Subjective:      Patient ID: Paola Strong is a 67 y o  male      He is here today because he is feeling sick    He was seen in [facility] on  (see note)  At that time he was having a low-grade fever cough and congestion and was diagnosed with a viral syndrome  Since that time he has been using [employer] brand NyQuil and DayQuil that have helped a little bit on but has not completely controlled cough    He denies any shortness of breath wheezing  He has occasional production of clear to slightly off color sputum  He feels lot of the cough is due to the tickle in the back of his throat  He has no history of asthma or COPD    His wife is developing similar symptoms  The following portions of the patient's history were reviewed and updated as appropriate: allergies, current medications and problem list     Review of Systems   Constitutional: Negative for fever  See HPI   HENT: Positive for congestion, mouth sores and postnasal drip  Negative for sinus pain, sinus pressure, sore throat and trouble swallowing  Rhinorrhea:  2 small sores on the side of his tongue  Eyes: Negative for discharge and redness  Respiratory: Positive for cough  Negative for chest tightness, shortness of breath and wheezing  See HPI   Cardiovascular: Negative for chest pain  Allergic/Immunologic: Negative for environmental allergies ( no history of environmental or seasonal allergies)  Objective:      /84 (BP Location: Left arm, Patient Position: Sitting, Cuff Size: Adult)   Pulse 72   Temp 98 1 °F (36 7 °C) (Tympanic)   Resp 16   Ht 5' 7" (1 702 m)   Wt 98 2 kg (216 lb 9 6 oz)   SpO2 97%   BMI 33 92 kg/m²          Physical Exam   Constitutional: He is oriented to person, place, and time  He appears well-developed and well-nourished  No distress  Actively coughing during the visit and he came in wearing a mask   HENT:   He has 2 small aphthous ulcers 1 on each side of his tongue about midway back    His nose is congested but not severely    He has some postnasal drip    His ears and tympanic membranes are normal with mild to moderate cerumen in each canal   Pulmonary/Chest: Effort normal and breath sounds normal  No stridor  No respiratory distress  He has no wheezes  He has no rales     He is coughing intermittently during the visit    His lungs are clear with no evidence of wheezes even on forced expiration and no rales or rhonchi evident  Air sounds are well preserved   Musculoskeletal: He exhibits no edema  Lymphadenopathy:     He has no cervical adenopathy  Neurological: He is alert and oriented to person, place, and time  Skin: No rash noted

## 2018-07-12 DIAGNOSIS — I10 ESSENTIAL HYPERTENSION: Primary | ICD-10-CM

## 2018-07-12 RX ORDER — CHLORTHALIDONE 25 MG/1
TABLET ORAL
Qty: 30 TABLET | Refills: 5 | Status: SHIPPED | OUTPATIENT
Start: 2018-07-12 | End: 2019-01-13 | Stop reason: SDUPTHER

## 2018-08-30 ENCOUNTER — OFFICE VISIT (OUTPATIENT)
Dept: FAMILY MEDICINE CLINIC | Facility: CLINIC | Age: 73
End: 2018-08-30
Payer: MEDICARE

## 2018-08-30 VITALS
HEIGHT: 67 IN | SYSTOLIC BLOOD PRESSURE: 122 MMHG | DIASTOLIC BLOOD PRESSURE: 78 MMHG | WEIGHT: 214.4 LBS | BODY MASS INDEX: 33.65 KG/M2 | HEART RATE: 64 BPM | TEMPERATURE: 98 F | RESPIRATION RATE: 16 BRPM

## 2018-08-30 DIAGNOSIS — M25.511 CHRONIC RIGHT SHOULDER PAIN: ICD-10-CM

## 2018-08-30 DIAGNOSIS — E66.09 CLASS 2 OBESITY DUE TO EXCESS CALORIES WITHOUT SERIOUS COMORBIDITY WITH BODY MASS INDEX (BMI) OF 35.0 TO 35.9 IN ADULT: ICD-10-CM

## 2018-08-30 DIAGNOSIS — Z23 NEED FOR INFLUENZA VACCINATION: ICD-10-CM

## 2018-08-30 DIAGNOSIS — I10 BENIGN ESSENTIAL HYPERTENSION: Primary | ICD-10-CM

## 2018-08-30 DIAGNOSIS — Z11.59 NEED FOR HEPATITIS C SCREENING TEST: ICD-10-CM

## 2018-08-30 DIAGNOSIS — I65.21 CAROTID STENOSIS, ASYMPTOMATIC, RIGHT: ICD-10-CM

## 2018-08-30 DIAGNOSIS — E78.00 HYPERCHOLESTEROLEMIA: ICD-10-CM

## 2018-08-30 DIAGNOSIS — G89.29 CHRONIC RIGHT SHOULDER PAIN: ICD-10-CM

## 2018-08-30 PROCEDURE — 99214 OFFICE O/P EST MOD 30 MIN: CPT | Performed by: FAMILY MEDICINE

## 2018-08-30 PROCEDURE — 90662 IIV NO PRSV INCREASED AG IM: CPT

## 2018-08-30 PROCEDURE — G0008 ADMIN INFLUENZA VIRUS VAC: HCPCS

## 2018-08-30 RX ORDER — SIMVASTATIN 40 MG
TABLET ORAL
COMMUNITY
Start: 2018-08-18 | End: 2019-09-12 | Stop reason: SDUPTHER

## 2018-08-30 NOTE — PROGRESS NOTES
Assessment/Plan:    He is generally up-to-date on his screenings with 2 colonoscopy in 2014 and recommendation repeat in 10 years    He had eye surgery for cataracts last fall and has a follow-up with the ophthalmologist this year and has not had any evidence of glaucoma    He is up-to-date in his immunizations  He has requested a flu shot today and will be given a high dose 1 as recommended  I have also discussed with him that he had previously had the Zostavax and I have recommended he get a Shingrix vaccine as a booster to this  He will check with his insurance and likely get a that local pharmacy is we do not have it available yet  He will get labs done before his next visit in 6 months and we will include hepatitis C antibody but not do a PSA this year as last year it was done and was stable  Plan would be to do this every other year as long as his health remains excellent  Benign essential hypertension  Blood pressure is well controlled on current medication  Continue same      Carotid stenosis, asymptomatic, right  He has had stable studies with less than 50 percent blockage and no unstable plaque in either carotid artery and there was no recommendation for continued surveillance  Hypercholesterolemia  Lipids have been control on current statin therapy  He will get blood test before next visit in 6 months    Obesity  We discussed weight today and the current guidelines in regard to trying to have his BMI reduce certainly below 30  I have suggested that he set a target of getting 1st under 210 pounds then under 205 pounds and then ideally under 200 pounds  We reviewed diet and particularly starches    As the weather is cooler he will begin walking regularly again    Chronic right shoulder pain  This continues the same and is distinctly related to raising his arm but is not limiting to his activities or sleep       Diagnoses and all orders for this visit:    Benign essential hypertension  - CBC and differential; Future  -     Comprehensive metabolic panel; Future  -     UA (URINE) with reflex to Microscopic; Future    Hypercholesterolemia  -     Comprehensive metabolic panel; Future  -     Lipid panel; Future    Class 2 obesity due to excess calories without serious comorbidity with body mass index (BMI) of 35 0 to 35 9 in adult    Need for influenza vaccination  -     Flu Vaccine High Dose Split Preservative Free IM    Need for hepatitis C screening test  -     Hepatitis C antibody; Future    Carotid stenosis, asymptomatic, right    Chronic right shoulder pain    Other orders  -     simvastatin (ZOCOR) 40 mg tablet;           Subjective:      Patient ID: Cyril Swartz is a 67 y o  male  He is here today for follow-up on his chronic conditions    He states that he is doing well and offers no complaints    He reports that there are no change in his habits   His diet is relatively balanced  Drinks 2 cups of coffee a day  He drinks beer couple times a year and not in excess  When the weather permits he tries to go for walks with his wife but the he has been prohibitive this summer        The following portions of the patient's history were reviewed and updated as appropriate: allergies, current medications, past medical history, past social history and problem list     Review of Systems   HENT: Positive for hearing loss (right ear) and tinnitus (right ear)  Negative for dental problem (dentist every 6 months - had recent exam)  Eyes:        Cataract surgery last fall  Doing well  Reading glasses   Respiratory: Negative for cough and shortness of breath  Cardiovascular: Negative for chest pain, palpitations and leg swelling  Gastrointestinal:        BM daily    Occasional dyspepsia with spicy foods   Genitourinary:        No issues  No nocturia   Musculoskeletal: Positive for arthralgias (right shoulder)  Skin: Negative for rash     Neurological: Positive for light-headedness (mild if gets up to fast)  Negative for dizziness, tremors and headaches  Psychiatric/Behavioral: Negative for confusion, decreased concentration, dysphoric mood and sleep disturbance  The patient is not nervous/anxious  Objective:      /78   Pulse 64   Temp 98 °F (36 7 °C) (Tympanic)   Resp 16   Ht 5' 7" (1 702 m)   Wt 97 3 kg (214 lb 6 4 oz)   BMI 33 58 kg/m²          Physical Exam   Constitutional: He is oriented to person, place, and time  He appears well-developed and well-nourished  No distress  Neck: No JVD present  No thyromegaly present  Cardiovascular: Normal rate and regular rhythm  No murmur heard  No bruits   Pulmonary/Chest: Effort normal and breath sounds normal    Musculoskeletal: He exhibits no edema  He has pain in the right shoulder when his arm is abducted and then he tries to raise it but does not have discomfort if he does in more of a reaching forward and then up fashion  Examination of the shoulder does not show any overt impingement or tenderness  Maneuvers for rotator cuff injury are negative   Lymphadenopathy:     He has no cervical adenopathy  Neurological: He is alert and oriented to person, place, and time  Skin:   Dystrophic great toenails that have been debridement or not thickened and appear that 1 of them may have been removed in the past   Psychiatric: He has a normal mood and affect  His behavior is normal  Judgment and thought content normal    Nursing note and vitals reviewed

## 2018-08-30 NOTE — ASSESSMENT & PLAN NOTE
Lipids have been control on current statin therapy  He will get blood test before next visit in 6 months

## 2018-08-30 NOTE — ASSESSMENT & PLAN NOTE
We discussed weight today and the current guidelines in regard to trying to have his BMI reduce certainly below 30  I have suggested that he set a target of getting 1st under 210 pounds then under 205 pounds and then ideally under 200 pounds  We reviewed diet and particularly starches    As the weather is cooler he will begin walking regularly again

## 2018-08-30 NOTE — ASSESSMENT & PLAN NOTE
This continues the same and is distinctly related to raising his arm but is not limiting to his activities or sleep

## 2018-08-30 NOTE — ASSESSMENT & PLAN NOTE
He has had stable studies with less than 50 percent blockage and no unstable plaque in either carotid artery and there was no recommendation for continued surveillance

## 2018-09-19 NOTE — ASSESSMENT & PLAN NOTE
He is on statin therapy moderate dose    His recent blood studies showed lipids to be optimal  Continue same and repeat labs in 1 year Ultrasound Complete

## 2019-01-13 DIAGNOSIS — I10 ESSENTIAL HYPERTENSION: ICD-10-CM

## 2019-01-14 RX ORDER — CHLORTHALIDONE 25 MG/1
TABLET ORAL
Qty: 30 TABLET | Refills: 5 | Status: SHIPPED | OUTPATIENT
Start: 2019-01-14 | End: 2019-07-12 | Stop reason: SDUPTHER

## 2019-02-15 DIAGNOSIS — I10 BENIGN ESSENTIAL HYPERTENSION: Primary | ICD-10-CM

## 2019-02-16 RX ORDER — LOSARTAN POTASSIUM 50 MG/1
TABLET ORAL
Qty: 90 TABLET | Refills: 3 | Status: SHIPPED | OUTPATIENT
Start: 2019-02-16 | End: 2020-02-10

## 2019-02-28 ENCOUNTER — TRANSCRIBE ORDERS (OUTPATIENT)
Dept: LAB | Facility: CLINIC | Age: 74
End: 2019-02-28

## 2019-02-28 ENCOUNTER — APPOINTMENT (OUTPATIENT)
Dept: LAB | Facility: CLINIC | Age: 74
End: 2019-02-28
Payer: MEDICARE

## 2019-02-28 DIAGNOSIS — I10 BENIGN ESSENTIAL HYPERTENSION: ICD-10-CM

## 2019-02-28 DIAGNOSIS — E78.00 HYPERCHOLESTEROLEMIA: ICD-10-CM

## 2019-02-28 DIAGNOSIS — Z11.59 NEED FOR HEPATITIS C SCREENING TEST: ICD-10-CM

## 2019-02-28 LAB
ALBUMIN SERPL BCP-MCNC: 4.1 G/DL (ref 3.5–5)
ALP SERPL-CCNC: 57 U/L (ref 46–116)
ALT SERPL W P-5'-P-CCNC: 29 U/L (ref 12–78)
ANION GAP SERPL CALCULATED.3IONS-SCNC: 7 MMOL/L (ref 4–13)
AST SERPL W P-5'-P-CCNC: 21 U/L (ref 5–45)
BASOPHILS # BLD AUTO: 0.04 THOUSANDS/ΜL (ref 0–0.1)
BASOPHILS NFR BLD AUTO: 1 % (ref 0–1)
BILIRUB SERPL-MCNC: 0.56 MG/DL (ref 0.2–1)
BILIRUB UR QL STRIP: NEGATIVE
BUN SERPL-MCNC: 20 MG/DL (ref 5–25)
CALCIUM SERPL-MCNC: 9.1 MG/DL (ref 8.3–10.1)
CHLORIDE SERPL-SCNC: 103 MMOL/L (ref 100–108)
CHOLEST SERPL-MCNC: 146 MG/DL (ref 50–200)
CLARITY UR: CLEAR
CO2 SERPL-SCNC: 30 MMOL/L (ref 21–32)
COLOR UR: YELLOW
CREAT SERPL-MCNC: 1.08 MG/DL (ref 0.6–1.3)
EOSINOPHIL # BLD AUTO: 0.32 THOUSAND/ΜL (ref 0–0.61)
EOSINOPHIL NFR BLD AUTO: 5 % (ref 0–6)
ERYTHROCYTE [DISTWIDTH] IN BLOOD BY AUTOMATED COUNT: 11.8 % (ref 11.6–15.1)
GFR SERPL CREATININE-BSD FRML MDRD: 68 ML/MIN/1.73SQ M
GLUCOSE P FAST SERPL-MCNC: 102 MG/DL (ref 65–99)
GLUCOSE UR STRIP-MCNC: NEGATIVE MG/DL
HCT VFR BLD AUTO: 38.1 % (ref 36.5–49.3)
HCV AB SER QL: NORMAL
HDLC SERPL-MCNC: 56 MG/DL (ref 40–60)
HGB BLD-MCNC: 13.2 G/DL (ref 12–17)
HGB UR QL STRIP.AUTO: NEGATIVE
IMM GRANULOCYTES # BLD AUTO: 0.01 THOUSAND/UL (ref 0–0.2)
IMM GRANULOCYTES NFR BLD AUTO: 0 % (ref 0–2)
KETONES UR STRIP-MCNC: NEGATIVE MG/DL
LDLC SERPL CALC-MCNC: 69 MG/DL (ref 0–100)
LEUKOCYTE ESTERASE UR QL STRIP: NEGATIVE
LYMPHOCYTES # BLD AUTO: 1.16 THOUSANDS/ΜL (ref 0.6–4.47)
LYMPHOCYTES NFR BLD AUTO: 19 % (ref 14–44)
MCH RBC QN AUTO: 31.1 PG (ref 26.8–34.3)
MCHC RBC AUTO-ENTMCNC: 34.6 G/DL (ref 31.4–37.4)
MCV RBC AUTO: 90 FL (ref 82–98)
MONOCYTES # BLD AUTO: 0.46 THOUSAND/ΜL (ref 0.17–1.22)
MONOCYTES NFR BLD AUTO: 8 % (ref 4–12)
NEUTROPHILS # BLD AUTO: 3.99 THOUSANDS/ΜL (ref 1.85–7.62)
NEUTS SEG NFR BLD AUTO: 67 % (ref 43–75)
NITRITE UR QL STRIP: NEGATIVE
NONHDLC SERPL-MCNC: 90 MG/DL
NRBC BLD AUTO-RTO: 0 /100 WBCS
PH UR STRIP.AUTO: 7.5 [PH] (ref 4.5–8)
PLATELET # BLD AUTO: 238 THOUSANDS/UL (ref 149–390)
PMV BLD AUTO: 9.5 FL (ref 8.9–12.7)
POTASSIUM SERPL-SCNC: 3.9 MMOL/L (ref 3.5–5.3)
PROT SERPL-MCNC: 6.8 G/DL (ref 6.4–8.2)
PROT UR STRIP-MCNC: NEGATIVE MG/DL
RBC # BLD AUTO: 4.24 MILLION/UL (ref 3.88–5.62)
SODIUM SERPL-SCNC: 140 MMOL/L (ref 136–145)
SP GR UR STRIP.AUTO: 1.02 (ref 1–1.03)
TRIGL SERPL-MCNC: 105 MG/DL
UROBILINOGEN UR QL STRIP.AUTO: 1 E.U./DL
WBC # BLD AUTO: 5.98 THOUSAND/UL (ref 4.31–10.16)

## 2019-02-28 PROCEDURE — 80061 LIPID PANEL: CPT

## 2019-02-28 PROCEDURE — 80053 COMPREHEN METABOLIC PANEL: CPT

## 2019-02-28 PROCEDURE — 85025 COMPLETE CBC W/AUTO DIFF WBC: CPT

## 2019-02-28 PROCEDURE — 86803 HEPATITIS C AB TEST: CPT

## 2019-02-28 PROCEDURE — 36415 COLL VENOUS BLD VENIPUNCTURE: CPT

## 2019-02-28 PROCEDURE — 81003 URINALYSIS AUTO W/O SCOPE: CPT

## 2019-03-07 ENCOUNTER — OFFICE VISIT (OUTPATIENT)
Dept: FAMILY MEDICINE CLINIC | Facility: CLINIC | Age: 74
End: 2019-03-07
Payer: MEDICARE

## 2019-03-07 VITALS
SYSTOLIC BLOOD PRESSURE: 136 MMHG | DIASTOLIC BLOOD PRESSURE: 72 MMHG | TEMPERATURE: 98.3 F | WEIGHT: 221.2 LBS | HEART RATE: 80 BPM | BODY MASS INDEX: 34.72 KG/M2 | RESPIRATION RATE: 16 BRPM | HEIGHT: 67 IN

## 2019-03-07 DIAGNOSIS — E66.09 CLASS 1 OBESITY DUE TO EXCESS CALORIES WITHOUT SERIOUS COMORBIDITY WITH BODY MASS INDEX (BMI) OF 34.0 TO 34.9 IN ADULT: ICD-10-CM

## 2019-03-07 DIAGNOSIS — I10 BENIGN ESSENTIAL HYPERTENSION: Primary | ICD-10-CM

## 2019-03-07 DIAGNOSIS — I65.21 CAROTID STENOSIS, ASYMPTOMATIC, RIGHT: ICD-10-CM

## 2019-03-07 DIAGNOSIS — E78.00 HYPERCHOLESTEROLEMIA: ICD-10-CM

## 2019-03-07 DIAGNOSIS — Z00.00 MEDICARE ANNUAL WELLNESS VISIT, SUBSEQUENT: ICD-10-CM

## 2019-03-07 DIAGNOSIS — H90.6 MIXED CONDUCTIVE AND SENSORINEURAL HEARING LOSS OF BOTH EARS: ICD-10-CM

## 2019-03-07 DIAGNOSIS — M15.9 PRIMARY OSTEOARTHRITIS INVOLVING MULTIPLE JOINTS: ICD-10-CM

## 2019-03-07 PROCEDURE — 99213 OFFICE O/P EST LOW 20 MIN: CPT | Performed by: FAMILY MEDICINE

## 2019-03-07 PROCEDURE — G0439 PPPS, SUBSEQ VISIT: HCPCS | Performed by: FAMILY MEDICINE

## 2019-03-07 RX ORDER — AMOXICILLIN 500 MG/1
CAPSULE ORAL
Refills: 2 | COMMUNITY
Start: 2019-02-26 | End: 2022-03-31 | Stop reason: ALTCHOICE

## 2019-03-07 NOTE — PROGRESS NOTES
Assessment and Plan:    He scored 4/5 on the mini cog  Depression screen was negative    His screenings are all up-to-date    His immunizations are up-to-date  He previously had a Zostavax and we discussed the Shingrix and he is on the waiting list here at our office    No particular risk factors were identified except he should optimize his weight  Problem List Items Addressed This Visit        Cardiovascular and Mediastinum    Benign essential hypertension - Primary    Carotid stenosis, asymptomatic, right       Musculoskeletal and Integument    Osteoarthritis       Other    Hypercholesterolemia    Obesity        Health Maintenance Due   Topic Date Due    Medicare Annual Wellness Visit (AWV)  1945    BMI: Followup Plan  11/10/1963    Pneumococcal PPSV23/PCV13 65+ Years / High and Highest Risk (2 of 2 - PPSV23) 09/10/2015    Depression Screening PHQ  02/22/2019         HPI:  Laz Myrick is a 68 y o  male here for his Subsequent Wellness Visit  He continues to live independently with his wife  Their home essentially is set up for 1st floor living with 2nd floor space for guests or hobby  They do have hand rails on the stair wells  The try to follow a balance diet  Remains active but no formal exercise    He denies any issues with balance and has not had any falls    Patient Active Problem List   Diagnosis    Benign essential hypertension    Carotid stenosis, asymptomatic, right    First degree AV block    Hypercholesterolemia    Obesity    Seborrheic keratoses    Chronic right shoulder pain    Cataracts, bilateral    Osteoarthritis     Past Medical History:   Diagnosis Date    Allergy     last assessed - [id number]    Dyspepsia     last assessed - [id number]    Hyperlipidemia     Hypertension     Onychomycosis     last assessed - [id number]    Plantar fasciitis      Past Surgical History:   Procedure Laterality Date    COLONOSCOPY  2004    complete colonoscopy    NEUROPLASTY / TRANSPOSITION MEDIAN NERVE AT CARPAL TUNNEL BILATERAL  2002    ROTATOR CUFF REPAIR Bilateral     Right - Resolved - Aug 2004; Left - Resolved - Feb 2006     Family History   Problem Relation Age of Onset    Crohn's disease Mother      Social History     Tobacco Use   Smoking Status Never Smoker   Smokeless Tobacco Never Used     Social History     Substance and Sexual Activity   Alcohol Use No      Social History     Substance and Sexual Activity   Drug Use No       Current Outpatient Medications   Medication Sig Dispense Refill    Ascorbic Acid (VITAMIN C) 100 MG tablet Take 100 mg by mouth daily      aspirin 81 MG tablet Take 1 tablet by mouth daily      chlorthalidone 25 mg tablet TAKE 1 TABLET BY MOUTH EVERY DAY 30 tablet 5    cholecalciferol (VITAMIN D3) 1,000 units tablet Take 500 Units by mouth daily      glucosamine-chondroitin 500-400 MG tablet Take 1 tablet by mouth 3 (three) times a day      losartan (COZAAR) 50 mg tablet TAKE 1 TABLET BY MOUTH EVERY DAY 90 tablet 3    Multiple Vitamins-Minerals (MULTIVITAMIN WITH MINERALS) tablet Take 1 tablet by mouth daily      Omega-3 Fatty Acids (FISH OIL) 1,000 mg Take 1,000 mg by mouth daily      simvastatin (ZOCOR) 40 mg tablet        No current facility-administered medications for this visit  No Known Allergies  Immunization History   Administered Date(s) Administered    Influenza Split High Dose Preservative Free IM 10/26/2015, 09/09/2016, 09/19/2017    Influenza TIV (IM) 10/01/1999    Influenza, high dose seasonal 0 5 mL 08/30/2018    Pneumococcal Conjugate 13-Valent 07/16/2015    Pneumococcal Polysaccharide PPV23 1945    Td (adult), adsorbed 05/25/2000, 07/01/2005    Tdap 04/18/2017    Zoster 1945       Patient Care Team:  Pramod Lindsey MD as PCP - General    Medicare Screening Tests and Risk Assessments:  Mik Aquino is here for his Subsequent Wellness visit    Last Medicare Wellness visit information reviewed, patient interviewed, no change since last AWV  Health Risk Assessment:  Patient rates overall health as excellent  Patient feels that their physical health rating is Same  Eyesight was rated as Same  Hearing was rated as Same  Patient feels that their emotional and mental health rating is Same  Pain experienced by patient in the last 7 days has been None  Emotional/Mental Health:  Patient has been feeling nervous/anxious  PHQ-9 Depression Screening:    Frequency of the following problems over the past two weeks:      1  Little interest or pleasure in doing things: 0 - not at all      2  Feeling down, depressed, or hopeless: 0 - not at all  PHQ-2 Score: 0          Broken Bones/Falls: Fall Risk Assessment:    In the past year, patient has experienced: No history of falling in past year          Bladder/Bowel:  Patient has not leaked urine accidently in the last six months  Patient reports no loss of bowel control  Immunizations:  Patient has had a flu vaccination within the last year  Patient has received a pneumonia shot  Patient has received a shingles shot  Home Safety:  Patient does not have trouble with stairs inside or outside of their home  Patient currently reports that there are no safety hazards present in home, working smoke alarms, working carbon monoxide detectors  Preventative Screenings:   prostate cancer screen performed, colon cancer screen completed, cholesterol screen completed, glaucoma eye exam completed,     Nutrition:  Current diet: Regular and Limited junk food with servings of the following:    Medications:  Patient is currently taking over-the-counter supplements  Patient is able to manage medications  Lifestyle Choices:  Patient reports no tobacco use  Patient has not smoked or used tobacco in the past   Patient reports no alcohol use  Patient drives a vehicle  Patient wears seat belt          Activities of Daily Living:  Can get out of bed by his or her self, able to dress self, able to make own meals, able to do own shopping, able to bathe self, can do own laundry/housekeeping, can manage own money, pay bills and track expenses    Previous Hospitalizations:  No hospitalization or ED visit in past 12 months        Advanced Directives:  Patient has decided on a power of   Patient has spoken to designated power of   Patient has completed advanced directive  Preventative Screening/Counseling:      Cardiovascular:      General: Screening Current          Diabetes:      General: Screening Current          Colorectal Cancer:      General: Screening Current          Prostate Cancer:      General: Screening Current          AAA:      General: Screening Current          Glaucoma:      General: Screening Current      Comments: Cataract surgery about 2-3 years ago then a check-up the next year  Hepatitis C:      General: Screening Current        Advanced Directives:   Patient has living will for healthcare, has durable POA for healthcare, patient has an advanced directive  Information on ACP and/or AD provided  End of life assessment reviewed with patient  BMI Counseling: Body mass index is 34 64 kg/m²  Discussed the patient's BMI with him  The BMI is above average  BMI counseling and education was provided to the patient  Exercise recommendations include exercising 3-5 times per week

## 2019-03-07 NOTE — ASSESSMENT & PLAN NOTE
Lipids are optimal with total cholesterol 146 triglycerides 105 HDL 56 LDL 69 on current dose of statin    Continue same

## 2019-03-07 NOTE — ASSESSMENT & PLAN NOTE
He has no significant carotid disease and is less than 50% bilateral  We will not do continued surveillance of this with full studies but will do a vascular screen again when he 75

## 2019-03-07 NOTE — ASSESSMENT & PLAN NOTE
Blood pressure is acceptable control albeit not quite optimal by today's reading    Continue current medication    Creatinine is 1 08 and GFR 68

## 2019-03-07 NOTE — PROGRESS NOTES
Assessment/Plan: We reviewed his blood test today which were pretty much unremarkable with the exception of an FBS of 102  He has no family history of diabetes  We will not get blood tests again next time is everything has been stable and will do them at his annual visit  Will do a PSA at that time as it will be 2 years since his last 1  Will get a vascular screening again at age 76    Benign essential hypertension  Blood pressure is acceptable control albeit not quite optimal by today's reading  Continue current medication    Creatinine is 1 08 and GFR 68    Carotid stenosis, asymptomatic, right  He has no significant carotid disease and is less than 50% bilateral  We will not do continued surveillance of this with full studies but will do a vascular screen again when he 75    Mixed conductive and sensorineural hearing loss of both ears  This is becoming somewhat of an impairment from him and that he can't misses some conversations or parts of conversations  He is not at a point where he is ready for hearing aids but we did discuss what they might bring to the table for him in regard to assisting him  Osteoarthritis  Stable at this point acceptable  He is on glucosamine chondroitin    Hypercholesterolemia  Lipids are optimal with total cholesterol 146 triglycerides 105 HDL 56 LDL 69 on current dose of statin  Continue same    Class 1 obesity due to excess calories without serious comorbidity with body mass index (BMI) of 34 0 to 34 9 in adult  We discussed his weight and diet  He tends to have 2nd helpings and also really like bread  We discussed strategies to making bread more of a treat and to eat slower and weight 10-15 minutes before getting the 2nd helping and if he is still hungry than go ahead and have a 2nd small portion    I also talked about regular exercise       Diagnoses and all orders for this visit:    Benign essential hypertension    Hypercholesterolemia    Primary osteoarthritis involving multiple joints    Class 1 obesity due to excess calories without serious comorbidity with body mass index (BMI) of 34 0 to 34 9 in adult    Carotid stenosis, asymptomatic, right    Medicare annual wellness visit, subsequent    Mixed conductive and sensorineural hearing loss of both ears    Other orders  -     amoxicillin (AMOXIL) 500 mg capsule; TAKE 4 CAPSULES 1 HOUR PRIOR TO APPT          Subjective:      Patient ID: Jcaob Ashford is a 68 y o  male  He is here today for follow-up on his chronic conditions    He reports he is doing generally well and offers no particular concerns today    He is aware that his weight is not optimal      The following portions of the patient's history were reviewed and updated as appropriate: allergies, current medications, past medical history, past social history and problem list     Review of Systems   Constitutional:        See HPI   HENT: Positive for hearing loss  Negative for dental problem and tinnitus  Eyes:        Due for an eye exam this year   Respiratory: Negative for cough and shortness of breath  Cardiovascular: Negative for chest pain, palpitations and leg swelling  Gastrointestinal:        Bowel movement is regular    No dyspepsia or GERD   Genitourinary:        No issues  He does not get up at night to void   Musculoskeletal:        Mild aches and pains but nothing concerning   Neurological: Negative for dizziness, tremors and headaches  Psychiatric/Behavioral: Negative for confusion, decreased concentration, dysphoric mood and sleep disturbance  The patient is not nervous/anxious  Objective:      /72   Pulse 80   Temp 98 3 °F (36 8 °C) (Oral)   Resp 16   Ht 5' 7" (1 702 m)   Wt 100 kg (221 lb 3 2 oz)   BMI 34 64 kg/m²          Physical Exam   Constitutional: He is oriented to person, place, and time  He appears well-developed and well-nourished  No distress  Cardiovascular: Normal rate and regular rhythm     No murmur heard  No bruits   Pulmonary/Chest: Effort normal and breath sounds normal    Musculoskeletal: He exhibits no edema  Neurological: He is alert and oriented to person, place, and time  Psychiatric: He has a normal mood and affect  His behavior is normal  Judgment and thought content normal    Nursing note and vitals reviewed

## 2019-03-07 NOTE — ASSESSMENT & PLAN NOTE
This is becoming somewhat of an impairment from him and that he can't misses some conversations or parts of conversations  He is not at a point where he is ready for hearing aids but we did discuss what they might bring to the table for him in regard to assisting him

## 2019-03-07 NOTE — ASSESSMENT & PLAN NOTE
We discussed his weight and diet  He tends to have 2nd helpings and also really like bread  We discussed strategies to making bread more of a treat and to eat slower and weight 10-15 minutes before getting the 2nd helping and if he is still hungry than go ahead and have a 2nd small portion    I also talked about regular exercise

## 2019-04-30 ENCOUNTER — CLINICAL SUPPORT (OUTPATIENT)
Dept: FAMILY MEDICINE CLINIC | Facility: CLINIC | Age: 74
End: 2019-04-30
Payer: MEDICARE

## 2019-04-30 DIAGNOSIS — Z23 NEED FOR SHINGLES VACCINE: Primary | ICD-10-CM

## 2019-04-30 PROCEDURE — 90750 HZV VACC RECOMBINANT IM: CPT

## 2019-04-30 PROCEDURE — 90471 IMMUNIZATION ADMIN: CPT

## 2019-05-16 DIAGNOSIS — E78.00 HYPERCHOLESTEROLEMIA: ICD-10-CM

## 2019-05-16 RX ORDER — SIMVASTATIN 40 MG
TABLET ORAL
Qty: 90 TABLET | Refills: 3 | Status: SHIPPED | OUTPATIENT
Start: 2019-05-16 | End: 2020-05-11

## 2019-07-12 DIAGNOSIS — I10 ESSENTIAL HYPERTENSION: ICD-10-CM

## 2019-07-12 RX ORDER — CHLORTHALIDONE 25 MG/1
TABLET ORAL
Qty: 30 TABLET | Refills: 5 | Status: SHIPPED | OUTPATIENT
Start: 2019-07-12 | End: 2019-09-10 | Stop reason: SDUPTHER

## 2019-07-18 ENCOUNTER — OFFICE VISIT (OUTPATIENT)
Dept: PODIATRY | Facility: CLINIC | Age: 74
End: 2019-07-18
Payer: MEDICARE

## 2019-07-18 VITALS
WEIGHT: 219.8 LBS | SYSTOLIC BLOOD PRESSURE: 131 MMHG | BODY MASS INDEX: 34.5 KG/M2 | HEART RATE: 78 BPM | HEIGHT: 67 IN | DIASTOLIC BLOOD PRESSURE: 79 MMHG

## 2019-07-18 DIAGNOSIS — B35.1 ONYCHOMYCOSIS: Primary | ICD-10-CM

## 2019-07-18 PROCEDURE — 11750 EXCISION NAIL&NAIL MATRIX: CPT | Performed by: PODIATRIST

## 2019-07-18 RX ORDER — LIDOCAINE HYDROCHLORIDE 10 MG/ML
1 INJECTION, SOLUTION INFILTRATION; PERINEURAL ONCE
Status: COMPLETED | OUTPATIENT
Start: 2019-07-18 | End: 2019-07-18

## 2019-07-18 RX ORDER — LIDOCAINE HYDROCHLORIDE AND EPINEPHRINE 10; 10 MG/ML; UG/ML
1 INJECTION, SOLUTION INFILTRATION; PERINEURAL ONCE
Status: COMPLETED | OUTPATIENT
Start: 2019-07-18 | End: 2019-07-18

## 2019-07-18 RX ADMIN — LIDOCAINE HYDROCHLORIDE 1 ML: 10 INJECTION, SOLUTION INFILTRATION; PERINEURAL at 14:40

## 2019-07-18 RX ADMIN — LIDOCAINE HYDROCHLORIDE AND EPINEPHRINE 1 ML: 10; 10 INJECTION, SOLUTION INFILTRATION; PERINEURAL at 14:40

## 2019-07-18 NOTE — PROGRESS NOTES
Patient presents with onychomycosis of each toenail  It is the patient desired to have all his toenails of initially removed  The left 2nd and 3rd toenails removed last year  Patient would like to address the right 4th and 5th toenails today  Procedure as follows: Anesthesia via 6 cc of 1-1 mixture of 1% xylocaine with epinephrine and 1% xylocaine plain  Betadine prep was performed  The right 4th and 5th toenails were avulsed to the eponychium  Total matrixectomy performed utilizing phenol in standard manner  Bacitracin dressing applied  Patient to soak b i d  In warm water followed by Neosporin dressing  Reappoint 1 week  Nail removal  Date/Time: 7/18/2019 2:42 PM  Performed by: Maxine Moulton DPM  Authorized by: Maxine Moulton DPM     Consent:     Consent obtained:  Verbal    Consent given by:  Patient    Risks discussed:  Incomplete removal, infection and pain    Alternatives discussed:  Alternative treatment  Universal protocol:     Procedure explained and questions answered to patient or proxy's satisfaction: yes      Patient identity confirmed:  Verbally with patient  Location:     Foot:  R fourth toe  Pre-procedure details:     Skin preparation:  Betadine  Anesthesia (see MAR for exact dosages): Anesthesia method:  Local infiltration    Local anesthetic:  Lidocaine 1% WITH epi and lidocaine 1% w/o epi  Nail Removal:     Nail removed:  Complete    Nail bed sutured: no      Removed nail replaced and anchored: no    Trephination:     Subungual hematoma drained: no    Ingrown nail:     Wedge excision of skin: no      Nail matrix removed or ablated:  Complete  Post-procedure details:     Dressing:  4x4 sterile gauze    Patient tolerance of procedure:   Tolerated well, no immediate complications  Nail removal  Date/Time: 7/18/2019 2:43 PM  Performed by: Maxine Moulton DPM  Authorized by: Maxine Moulton DPM     Consent:     Consent obtained:  Verbal    Consent given by:  Patient    Risks discussed: Bleeding, incomplete removal, infection and pain    Alternatives discussed:  Delayed treatment and alternative treatment  Universal protocol:     Procedure explained and questions answered to patient or proxy's satisfaction: yes      Patient identity confirmed:  Verbally with patient  Location:     Foot:  R little toe  Pre-procedure details:     Skin preparation:  Betadine  Anesthesia (see MAR for exact dosages): Anesthesia method:  Local infiltration    Local anesthetic:  Lidocaine 1% WITH epi and lidocaine 1% w/o epi  Nail Removal:     Nail removed:  Complete    Nail bed sutured: no      Removed nail replaced and anchored: no    Trephination:     Subungual hematoma drained: no    Ingrown nail:     Wedge excision of skin: no      Nail matrix removed or ablated:  Complete  Post-procedure details:     Dressing:  4x4 sterile gauze    Patient tolerance of procedure:   Tolerated well, no immediate complications

## 2019-08-02 ENCOUNTER — OFFICE VISIT (OUTPATIENT)
Dept: PODIATRY | Facility: CLINIC | Age: 74
End: 2019-08-02

## 2019-08-02 VITALS
BODY MASS INDEX: 34.47 KG/M2 | HEIGHT: 67 IN | WEIGHT: 219.6 LBS | DIASTOLIC BLOOD PRESSURE: 80 MMHG | HEART RATE: 70 BPM | SYSTOLIC BLOOD PRESSURE: 157 MMHG

## 2019-08-02 DIAGNOSIS — B35.1 ONYCHOMYCOSIS: Primary | ICD-10-CM

## 2019-08-02 PROCEDURE — 99024 POSTOP FOLLOW-UP VISIT: CPT | Performed by: PODIATRIST

## 2019-08-02 NOTE — PROGRESS NOTES
Patient presents 1 week post total matrixectomy right 4th and 5th toes  No pain related  Surgical sites healing uneventfully  Patient may discontinue soaks and Neosporin  He should keep the digits covered until eschar forms  He will be rescheduled in 2 months and additional matrixectomies will be performed

## 2019-08-15 ENCOUNTER — TELEPHONE (OUTPATIENT)
Dept: FAMILY MEDICINE CLINIC | Facility: CLINIC | Age: 74
End: 2019-08-15

## 2019-08-19 NOTE — TELEPHONE ENCOUNTER
Please call  Does not need labs before this visit  Labs were good and stable when done in 2/2019 so will just do yearly  Will order then for him when I see him for the following time

## 2019-09-10 DIAGNOSIS — I10 ESSENTIAL HYPERTENSION: ICD-10-CM

## 2019-09-10 RX ORDER — CHLORTHALIDONE 25 MG/1
TABLET ORAL
Qty: 90 TABLET | Refills: 3 | Status: SHIPPED | OUTPATIENT
Start: 2019-09-10 | End: 2020-11-09

## 2019-09-12 ENCOUNTER — OFFICE VISIT (OUTPATIENT)
Dept: FAMILY MEDICINE CLINIC | Facility: CLINIC | Age: 74
End: 2019-09-12
Payer: MEDICARE

## 2019-09-12 VITALS
BODY MASS INDEX: 34.09 KG/M2 | HEART RATE: 70 BPM | RESPIRATION RATE: 20 BRPM | OXYGEN SATURATION: 96 % | HEIGHT: 67 IN | SYSTOLIC BLOOD PRESSURE: 122 MMHG | DIASTOLIC BLOOD PRESSURE: 74 MMHG | WEIGHT: 217.2 LBS | TEMPERATURE: 97.9 F

## 2019-09-12 DIAGNOSIS — M15.9 PRIMARY OSTEOARTHRITIS INVOLVING MULTIPLE JOINTS: ICD-10-CM

## 2019-09-12 DIAGNOSIS — E66.09 CLASS 1 OBESITY DUE TO EXCESS CALORIES WITHOUT SERIOUS COMORBIDITY WITH BODY MASS INDEX (BMI) OF 34.0 TO 34.9 IN ADULT: ICD-10-CM

## 2019-09-12 DIAGNOSIS — E78.00 HYPERCHOLESTEROLEMIA: ICD-10-CM

## 2019-09-12 DIAGNOSIS — Z12.5 PROSTATE CANCER SCREENING: ICD-10-CM

## 2019-09-12 DIAGNOSIS — I65.21 CAROTID STENOSIS, ASYMPTOMATIC, RIGHT: ICD-10-CM

## 2019-09-12 DIAGNOSIS — H90.6 MIXED CONDUCTIVE AND SENSORINEURAL HEARING LOSS OF BOTH EARS: ICD-10-CM

## 2019-09-12 DIAGNOSIS — I10 BENIGN ESSENTIAL HYPERTENSION: Primary | ICD-10-CM

## 2019-09-12 DIAGNOSIS — Z23 NEED FOR SHINGLES VACCINE: ICD-10-CM

## 2019-09-12 PROCEDURE — 90471 IMMUNIZATION ADMIN: CPT

## 2019-09-12 PROCEDURE — 99214 OFFICE O/P EST MOD 30 MIN: CPT | Performed by: FAMILY MEDICINE

## 2019-09-12 PROCEDURE — 90750 HZV VACC RECOMBINANT IM: CPT

## 2019-09-12 RX ORDER — GLUCOSAMINE SULFATE 500 MG
500 CAPSULE ORAL
COMMUNITY

## 2019-09-12 NOTE — PROGRESS NOTES
Chief Complaint   Patient presents with    Follow-up     6 month follow up    Immunizations     Shingrix Dose #2     Health Maintenance   Topic Date Due    Pneumococcal Vaccine: 65+ Years (2 of 2 - PPSV23) 07/16/2016    Fall Risk  03/07/2020    Depression Screening PHQ  03/07/2020    Medicare Annual Wellness Visit (AWV)  03/07/2020    BMI: Followup Plan  03/07/2020    BMI: Adult  09/12/2020    CRC Screening: Colonoscopy  08/08/2024    DTaP,Tdap,and Td Vaccines (2 - Td) 04/18/2027    Hepatitis C Screening  Completed    INFLUENZA VACCINE  Completed    Pneumococcal Vaccine: Pediatrics (0 to 5 Years) and At-Risk Patients (6 to 59 Years)  Aged Out    HEPATITIS B VACCINES  Aged Out     Assessment/Plan:    He got his Shingrix #2 today    He had a flu shot at [facility] last Thursday    He had a colonoscopy in 2014 which was non-remarkable except for some I had diverticulosis and gastroenterology suggested that he have another colonoscopy in 10 years based on current protocols    He will get labs before next visit    Carotid stenosis, asymptomatic, right  He had carotid study screening in 2015 which showed less than 50% bilateral stenosis and had a full carotid study done in 2016 in 2017 that showed them to be stable  He is asymptomatic and never has had symptoms from this    We will get a carotid study done before his next visit in 6 months    He is on aspirin and statin    Benign essential hypertension  Blood pressure is controlled on current medication    Mixed conductive and sensorineural hearing loss of both ears  He indeed has trouble and at times during the visit I could tell needed to lean forward to here may  At this point is not feel it is interrupting his ability to socialize her function and is not interested in hearing evaluation for hearing aids    Osteoarthritis  This is most bothersome in his right shoulder which can her to be stiff at times but does not limit his activities    He takes no medication for this  I have encouraged him to do stretching on that shoulder in order to keep it from becoming limited    Hypercholesterolemia  He was given a slip for lab test before next visit  Continue current statin therapy    Class 1 obesity due to excess calories without serious comorbidity with body mass index (BMI) of 34 0 to 34 9 in adult  We discussed his weight today  He has been generally stable but his BMI is in the obese range  On reviewing his diet it appears that 1 of the things he does pretty regularly is have 2nd helping so we discussed the strategy today to try to eat slower on his 1st helping and then wait at least 10 minutes after finishing his 1st helping before going back to get a 2nd helping  I also believe he could more regularly take a walk even if he does have by himself  Goal would be to lose 5-10 lb before next visit       Diagnoses and all orders for this visit:    Benign essential hypertension  -     CBC and Platelet; Future  -     Comprehensive metabolic panel; Future  -     UA (URINE) with reflex to Microscopic; Future    Hypercholesterolemia  -     Comprehensive metabolic panel; Future  -     Lipid panel; Future    Carotid stenosis, asymptomatic, right  -     VAS carotid complete study; Future    Primary osteoarthritis involving multiple joints    Class 1 obesity due to excess calories without serious comorbidity with body mass index (BMI) of 34 0 to 34 9 in adult    Mixed conductive and sensorineural hearing loss of both ears    Need for shingles vaccine  -     Zoster Vaccine Recombinant IM    Prostate cancer screening  -     PSA, Total Screen; Future    Other orders  -     glucosamine 500 MG CAPS capsule; Take 500 mg by mouth          Subjective:      Patient ID: Phan Hampton is a 68 y o  male      He is here today for follow-up on his chronic conditions    He feels good and offers no concerns or complaints today    He reports no change in habits or activity  He walks but not regularly and part of it is dependent upon his wife who has some problems and can always go out for a walk  Drinks 1-2 cups coffee a day  He drinks alcoholic beverages infrequently      The following portions of the patient's history were reviewed and updated as appropriate: allergies, current medications, past family history, past medical history, past social history, past surgical history and problem list     Review of Systems   Constitutional:        See HPI   HENT: Positive for hearing loss (mild to moderate hearing loss - mostly with background noise ) and tinnitus (bilateral R>L)  Negative for dental problem (sees dentist next month)  Eyes:        Has appt next week   Respiratory: Negative for cough and shortness of breath  Cardiovascular: Negative for chest pain, palpitations and leg swelling  Gastrointestinal:        BM regular    Occasional dyspepsia and GERD - food dependent  Occurs a couple of times a month  Uses TUMS or Mylanta prn   Genitourinary:        No issues   Musculoskeletal: Positive for arthralgias (right shoulder)  Skin: Negative for rash  Neurological: Positive for dizziness (if gets up quickly or turns quickly)  Negative for tremors, light-headedness and headaches  Psychiatric/Behavioral: Negative for confusion, decreased concentration, dysphoric mood and sleep disturbance (goes to bed about 12-1:30am and gets up about 8am)  The patient is not nervous/anxious  Objective:      /74 (BP Location: Left arm, Patient Position: Sitting, Cuff Size: Adult)   Pulse 70   Temp 97 9 °F (36 6 °C) (Tympanic)   Resp 20   Ht 5' 7" (1 702 m)   Wt 98 5 kg (217 lb 3 2 oz)   SpO2 96%   BMI 34 02 kg/m²          Physical Exam   Constitutional: He is oriented to person, place, and time  He appears well-developed and well-nourished  Neck: No thyromegaly present  Cardiovascular: Normal rate and regular rhythm  No murmur heard    Pulmonary/Chest: Effort normal and breath sounds normal    Musculoskeletal: He exhibits no edema  Lymphadenopathy:     He has no cervical adenopathy  Neurological: He is alert and oriented to person, place, and time  Psychiatric: He has a normal mood and affect  His behavior is normal  Judgment and thought content normal    Nursing note and vitals reviewed

## 2019-09-13 NOTE — ASSESSMENT & PLAN NOTE
This is most bothersome in his right shoulder which can her to be stiff at times but does not limit his activities  He takes no medication for this      I have encouraged him to do stretching on that shoulder in order to keep it from becoming limited

## 2019-09-13 NOTE — ASSESSMENT & PLAN NOTE
He had carotid study screening in 2015 which showed less than 50% bilateral stenosis and had a full carotid study done in 2016 in 2017 that showed them to be stable      He is asymptomatic and never has had symptoms from this    We will get a carotid study done before his next visit in 6 months    He is on aspirin and statin

## 2019-09-13 NOTE — ASSESSMENT & PLAN NOTE
He indeed has trouble and at times during the visit I could tell needed to lean forward to here may    At this point is not feel it is interrupting his ability to socialize her function and is not interested in hearing evaluation for hearing aids

## 2019-09-13 NOTE — ASSESSMENT & PLAN NOTE
We discussed his weight today  He has been generally stable but his BMI is in the obese range  On reviewing his diet it appears that 1 of the things he does pretty regularly is have 2nd helping so we discussed the strategy today to try to eat slower on his 1st helping and then wait at least 10 minutes after finishing his 1st helping before going back to get a 2nd helping  I also believe he could more regularly take a walk even if he does have by himself    Goal would be to lose 5-10 lb before next visit

## 2019-10-02 ENCOUNTER — OFFICE VISIT (OUTPATIENT)
Dept: PODIATRY | Facility: CLINIC | Age: 74
End: 2019-10-02
Payer: MEDICARE

## 2019-10-02 VITALS
BODY MASS INDEX: 34 KG/M2 | DIASTOLIC BLOOD PRESSURE: 72 MMHG | WEIGHT: 216.6 LBS | SYSTOLIC BLOOD PRESSURE: 122 MMHG | HEART RATE: 65 BPM | HEIGHT: 67 IN

## 2019-10-02 DIAGNOSIS — B35.1 ONYCHOMYCOSIS: Primary | ICD-10-CM

## 2019-10-02 PROCEDURE — 11750 EXCISION NAIL&NAIL MATRIX: CPT | Performed by: PODIATRIST

## 2019-10-02 RX ORDER — LIDOCAINE HYDROCHLORIDE AND EPINEPHRINE 10; 10 MG/ML; UG/ML
1 INJECTION, SOLUTION INFILTRATION; PERINEURAL ONCE
Status: COMPLETED | OUTPATIENT
Start: 2019-10-02 | End: 2019-10-02

## 2019-10-02 RX ORDER — LIDOCAINE HYDROCHLORIDE 10 MG/ML
1 INJECTION, SOLUTION INFILTRATION; PERINEURAL ONCE
Status: COMPLETED | OUTPATIENT
Start: 2019-10-02 | End: 2019-10-02

## 2019-10-02 RX ADMIN — LIDOCAINE HYDROCHLORIDE AND EPINEPHRINE 1 ML: 10; 10 INJECTION, SOLUTION INFILTRATION; PERINEURAL at 10:26

## 2019-10-02 RX ADMIN — LIDOCAINE HYDROCHLORIDE 1 ML: 10 INJECTION, SOLUTION INFILTRATION; PERINEURAL at 10:25

## 2019-10-02 NOTE — PROGRESS NOTES
Patient presents for total matrixectomy right 2nd and 3rd toes  After this procedure, patient will have no toenails on the right foot  The goal of this procedure is permanent erradication of the offending nail  Verbal consent was given  Procedure performed as follows: Anesthesia via 5 cc of a 1:1 mixture of 1 percent xylocaine with epinephrine and 1 percent xylocaine plain  A Betadine prep was performed  The right 2nd and 3rd  toenails were removed to the eponychium  A total matrixectomy was performed utilizing phenol in a standard manner to each toeNail removal  Date/Time: 10/2/2019 10:24 AM  Performed by: Amilcar Villar DPM  Authorized by: Amilcar Villar DPM     Patient location:  Clinic  Other Assisting Provider: No    Consent:     Consent obtained:  Verbal    Consent given by:  Patient    Risks discussed:  Permanent nail deformity, pain and infection    Alternatives discussed:  No treatment  Universal protocol:     Procedure explained and questions answered to patient or proxy's satisfaction: yes      Patient identity confirmed:  Verbally with patient  Location:     Foot:  R second toe  Pre-procedure details:     Skin preparation:  Betadine  Anesthesia (see MAR for exact dosages): Anesthesia method:  Nerve block    Block needle gauge:  25 G    Block anesthetic:  Lidocaine 1% w/o epi and lidocaine 2% WITH epi    Block outcome:  Anesthesia achieved  Nail Removal:     Nail removed:  Partial    Nail side:  Lateral    Nail bed sutured: no    Ingrown nail:     Nail matrix removed or ablated:  Complete  Post-procedure details:     Dressing:  4x4 sterile gauze and antibiotic ointment    Patient tolerance of procedure:   Tolerated well, no immediate complications  Nail removal  Date/Time: 10/2/2019 10:25 AM  Performed by: Amilcar Villar DPM  Authorized by: Amilcar Villar DPM     Patient location:  Other (comment)  Other Assisting Provider: No    Consent:     Consent obtained:  Verbal    Consent given by:  Patient    Risks discussed:  Permanent nail deformity, pain and infection    Alternatives discussed:  No treatment  Universal protocol:     Procedure explained and questions answered to patient or proxy's satisfaction: yes      Patient identity confirmed:  Verbally with patient  Location:     Foot:  R third toe  Pre-procedure details:     Skin preparation:  Betadine  Anesthesia (see MAR for exact dosages): Anesthesia method:  Nerve block    Block needle gauge:  25 G    Block anesthetic:  Lidocaine 1% w/o epi and lidocaine 2% WITH epi    Block outcome:  Anesthesia achieved  Nail Removal:     Nail removed:  Complete    Nail bed sutured: no    Ingrown nail:     Nail matrix removed or ablated:  Partial  Post-procedure details:     Dressing:  4x4 sterile gauze and antibiotic ointment    Patient tolerance of procedure: Tolerated well, no immediate complications        A bacitracin dressing is applied  The patient is to soak in warm water twice a day followed by a Neosporin dressing

## 2019-10-04 ENCOUNTER — OFFICE VISIT (OUTPATIENT)
Dept: PODIATRY | Facility: CLINIC | Age: 74
End: 2019-10-04

## 2019-10-04 VITALS
WEIGHT: 216.2 LBS | HEIGHT: 67 IN | HEART RATE: 72 BPM | SYSTOLIC BLOOD PRESSURE: 94 MMHG | BODY MASS INDEX: 33.93 KG/M2 | DIASTOLIC BLOOD PRESSURE: 72 MMHG

## 2019-10-04 DIAGNOSIS — L03.115 CELLULITIS OF RIGHT FOOT: ICD-10-CM

## 2019-10-04 DIAGNOSIS — B35.1 ONYCHOMYCOSIS: Primary | ICD-10-CM

## 2019-10-04 PROCEDURE — 99024 POSTOP FOLLOW-UP VISIT: CPT | Performed by: PODIATRIST

## 2019-10-04 RX ORDER — CEPHALEXIN 500 MG/1
500 CAPSULE ORAL 4 TIMES DAILY
Qty: 28 CAPSULE | Refills: 0 | Status: SHIPPED | OUTPATIENT
Start: 2019-10-04 | End: 2019-10-11

## 2019-10-04 RX ORDER — CEPHALEXIN 500 MG/1
500 CAPSULE ORAL 3 TIMES DAILY
Qty: 21 CAPSULE | Refills: 0 | Status: SHIPPED | OUTPATIENT
Start: 2019-10-04 | End: 2019-10-11

## 2019-10-04 NOTE — PROGRESS NOTES
Patient presents 2 days post total matrixectomy right 2nd and 3rd toes  The right 2nd toe is very painful and inflamed  Third toe also uncomfortable  Generally patient has minimal discomfort with matrixectomy is  On exam, right 2nd toe markedly inflamed  Right 3rd toe appears to be healing uneventfully  Patient advised to discontinue Neosporin  He was placed on Silvadene cream b i d  And cephalexin 500 mg q i d  As precaution  He will keep his scheduled appointment in approximately 10 days

## 2019-10-17 ENCOUNTER — OFFICE VISIT (OUTPATIENT)
Dept: PODIATRY | Facility: CLINIC | Age: 74
End: 2019-10-17
Payer: MEDICARE

## 2019-10-17 VITALS
WEIGHT: 218 LBS | SYSTOLIC BLOOD PRESSURE: 133 MMHG | HEIGHT: 67 IN | DIASTOLIC BLOOD PRESSURE: 83 MMHG | HEART RATE: 70 BPM | BODY MASS INDEX: 34.21 KG/M2

## 2019-10-17 DIAGNOSIS — L03.115 CELLULITIS OF RIGHT FOOT: ICD-10-CM

## 2019-10-17 DIAGNOSIS — B35.1 ONYCHOMYCOSIS: Primary | ICD-10-CM

## 2019-10-17 PROCEDURE — 99212 OFFICE O/P EST SF 10 MIN: CPT | Performed by: PODIATRIST

## 2019-10-17 RX ORDER — CYCLOSPORINE 0.5 MG/ML
EMULSION OPHTHALMIC
Refills: 4 | COMMUNITY
Start: 2019-10-09

## 2019-10-17 NOTE — PROGRESS NOTES
Patient presents for assessment of the right 2nd and 3rd toes  Patient had total matrixectomy of the right 2nd and 3rd toes on October 2  The became infected and patient was placed on cephalexin and Silvadene cream   Today, patient is doing very well with no evidence of infection and minimal drainage in pain  He may discontinue soaks and Silvadene  He may discontinue dressings when all draining has stopped  Reappoint p r n

## 2020-01-09 ENCOUNTER — HOSPITAL ENCOUNTER (OUTPATIENT)
Dept: NON INVASIVE DIAGNOSTICS | Facility: CLINIC | Age: 75
Discharge: HOME | End: 2020-01-09
Payer: MEDICARE

## 2020-01-09 DIAGNOSIS — I65.21 CAROTID STENOSIS, ASYMPTOMATIC, RIGHT: ICD-10-CM

## 2020-01-09 PROCEDURE — 93880 EXTRACRANIAL BILAT STUDY: CPT | Performed by: SURGERY

## 2020-01-09 PROCEDURE — 93880 EXTRACRANIAL BILAT STUDY: CPT

## 2020-02-07 ENCOUNTER — APPOINTMENT (OUTPATIENT)
Dept: LAB | Facility: CLINIC | Age: 75
End: 2020-02-07
Payer: MEDICARE

## 2020-02-07 DIAGNOSIS — I10 BENIGN ESSENTIAL HYPERTENSION: ICD-10-CM

## 2020-02-07 DIAGNOSIS — E78.00 HYPERCHOLESTEROLEMIA: ICD-10-CM

## 2020-02-07 DIAGNOSIS — Z12.5 PROSTATE CANCER SCREENING: ICD-10-CM

## 2020-02-07 LAB
ALBUMIN SERPL BCP-MCNC: 3.9 G/DL (ref 3.5–5)
ALP SERPL-CCNC: 53 U/L (ref 46–116)
ALT SERPL W P-5'-P-CCNC: 26 U/L (ref 12–78)
ANION GAP SERPL CALCULATED.3IONS-SCNC: 3 MMOL/L (ref 4–13)
AST SERPL W P-5'-P-CCNC: 19 U/L (ref 5–45)
BILIRUB SERPL-MCNC: 0.79 MG/DL (ref 0.2–1)
BILIRUB UR QL STRIP: NEGATIVE
BUN SERPL-MCNC: 18 MG/DL (ref 5–25)
CALCIUM SERPL-MCNC: 9.4 MG/DL (ref 8.3–10.1)
CHLORIDE SERPL-SCNC: 102 MMOL/L (ref 100–108)
CHOLEST SERPL-MCNC: 153 MG/DL (ref 50–200)
CLARITY UR: NORMAL
CO2 SERPL-SCNC: 32 MMOL/L (ref 21–32)
COLOR UR: YELLOW
CREAT SERPL-MCNC: 1.08 MG/DL (ref 0.6–1.3)
ERYTHROCYTE [DISTWIDTH] IN BLOOD BY AUTOMATED COUNT: 11.9 % (ref 11.6–15.1)
GFR SERPL CREATININE-BSD FRML MDRD: 67 ML/MIN/1.73SQ M
GLUCOSE P FAST SERPL-MCNC: 96 MG/DL (ref 65–99)
GLUCOSE UR STRIP-MCNC: NEGATIVE MG/DL
HCT VFR BLD AUTO: 42.2 % (ref 36.5–49.3)
HDLC SERPL-MCNC: 60 MG/DL
HGB BLD-MCNC: 13.9 G/DL (ref 12–17)
HGB UR QL STRIP.AUTO: NEGATIVE
KETONES UR STRIP-MCNC: NEGATIVE MG/DL
LDLC SERPL CALC-MCNC: 73 MG/DL (ref 0–100)
LEUKOCYTE ESTERASE UR QL STRIP: NEGATIVE
MCH RBC QN AUTO: 29.2 PG (ref 26.8–34.3)
MCHC RBC AUTO-ENTMCNC: 32.9 G/DL (ref 31.4–37.4)
MCV RBC AUTO: 89 FL (ref 82–98)
NITRITE UR QL STRIP: NEGATIVE
NONHDLC SERPL-MCNC: 93 MG/DL
PH UR STRIP.AUTO: 6.5 [PH]
PLATELET # BLD AUTO: 242 THOUSANDS/UL (ref 149–390)
PMV BLD AUTO: 9.3 FL (ref 8.9–12.7)
POTASSIUM SERPL-SCNC: 3.5 MMOL/L (ref 3.5–5.3)
PROT SERPL-MCNC: 7.1 G/DL (ref 6.4–8.2)
PROT UR STRIP-MCNC: NEGATIVE MG/DL
PSA SERPL-MCNC: 2 NG/ML (ref 0–4)
RBC # BLD AUTO: 4.76 MILLION/UL (ref 3.88–5.62)
SODIUM SERPL-SCNC: 137 MMOL/L (ref 136–145)
SP GR UR STRIP.AUTO: 1.02 (ref 1–1.03)
TRIGL SERPL-MCNC: 101 MG/DL
UROBILINOGEN UR QL STRIP.AUTO: 1 E.U./DL
WBC # BLD AUTO: 5.71 THOUSAND/UL (ref 4.31–10.16)

## 2020-02-07 PROCEDURE — 80053 COMPREHEN METABOLIC PANEL: CPT

## 2020-02-07 PROCEDURE — 80061 LIPID PANEL: CPT

## 2020-02-07 PROCEDURE — 36415 COLL VENOUS BLD VENIPUNCTURE: CPT

## 2020-02-07 PROCEDURE — 81003 URINALYSIS AUTO W/O SCOPE: CPT

## 2020-02-07 PROCEDURE — G0103 PSA SCREENING: HCPCS

## 2020-02-07 PROCEDURE — 85027 COMPLETE CBC AUTOMATED: CPT

## 2020-02-10 DIAGNOSIS — I10 BENIGN ESSENTIAL HYPERTENSION: ICD-10-CM

## 2020-02-10 RX ORDER — LOSARTAN POTASSIUM 50 MG/1
TABLET ORAL
Qty: 90 TABLET | Refills: 3 | Status: SHIPPED | OUTPATIENT
Start: 2020-02-10 | End: 2020-12-21

## 2020-03-12 ENCOUNTER — OFFICE VISIT (OUTPATIENT)
Dept: FAMILY MEDICINE CLINIC | Facility: CLINIC | Age: 75
End: 2020-03-12
Payer: MEDICARE

## 2020-03-12 VITALS
BODY MASS INDEX: 34.21 KG/M2 | OXYGEN SATURATION: 95 % | HEIGHT: 67 IN | TEMPERATURE: 97.3 F | HEART RATE: 84 BPM | WEIGHT: 218 LBS | DIASTOLIC BLOOD PRESSURE: 70 MMHG | SYSTOLIC BLOOD PRESSURE: 132 MMHG | RESPIRATION RATE: 14 BRPM

## 2020-03-12 DIAGNOSIS — Z00.00 MEDICARE ANNUAL WELLNESS VISIT, SUBSEQUENT: ICD-10-CM

## 2020-03-12 DIAGNOSIS — I65.21 CAROTID STENOSIS, ASYMPTOMATIC, RIGHT: ICD-10-CM

## 2020-03-12 DIAGNOSIS — I10 BENIGN ESSENTIAL HYPERTENSION: Primary | ICD-10-CM

## 2020-03-12 DIAGNOSIS — E66.09 CLASS 1 OBESITY DUE TO EXCESS CALORIES WITHOUT SERIOUS COMORBIDITY WITH BODY MASS INDEX (BMI) OF 34.0 TO 34.9 IN ADULT: ICD-10-CM

## 2020-03-12 DIAGNOSIS — E78.00 HYPERCHOLESTEROLEMIA: ICD-10-CM

## 2020-03-12 DIAGNOSIS — R21 RASH: ICD-10-CM

## 2020-03-12 DIAGNOSIS — M15.9 PRIMARY OSTEOARTHRITIS INVOLVING MULTIPLE JOINTS: ICD-10-CM

## 2020-03-12 DIAGNOSIS — H90.6 MIXED CONDUCTIVE AND SENSORINEURAL HEARING LOSS OF BOTH EARS: ICD-10-CM

## 2020-03-12 PROCEDURE — 3008F BODY MASS INDEX DOCD: CPT | Performed by: FAMILY MEDICINE

## 2020-03-12 PROCEDURE — 4040F PNEUMOC VAC/ADMIN/RCVD: CPT | Performed by: FAMILY MEDICINE

## 2020-03-12 PROCEDURE — 1125F AMNT PAIN NOTED PAIN PRSNT: CPT | Performed by: FAMILY MEDICINE

## 2020-03-12 PROCEDURE — 1160F RVW MEDS BY RX/DR IN RCRD: CPT | Performed by: FAMILY MEDICINE

## 2020-03-12 PROCEDURE — 3075F SYST BP GE 130 - 139MM HG: CPT | Performed by: FAMILY MEDICINE

## 2020-03-12 PROCEDURE — 99213 OFFICE O/P EST LOW 20 MIN: CPT | Performed by: FAMILY MEDICINE

## 2020-03-12 PROCEDURE — 1036F TOBACCO NON-USER: CPT | Performed by: FAMILY MEDICINE

## 2020-03-12 PROCEDURE — 3078F DIAST BP <80 MM HG: CPT | Performed by: FAMILY MEDICINE

## 2020-03-12 PROCEDURE — 1170F FXNL STATUS ASSESSED: CPT | Performed by: FAMILY MEDICINE

## 2020-03-12 PROCEDURE — G0439 PPPS, SUBSEQ VISIT: HCPCS | Performed by: FAMILY MEDICINE

## 2020-03-12 RX ORDER — HYDROCORTISONE, IODOQUINOL 10; 10 MG/G; MG/G
CREAM TOPICAL 2 TIMES DAILY PRN
Qty: 1 TUBE | Refills: 2 | Status: SHIPPED | OUTPATIENT
Start: 2020-03-12 | End: 2020-03-17 | Stop reason: CLARIF

## 2020-03-12 NOTE — ASSESSMENT & PLAN NOTE
Blood pressure is at target    Creatinine was 1 08 GFR 67  Electrolytes within normal limits    Continue current medication

## 2020-03-12 NOTE — ASSESSMENT & PLAN NOTE
He is working at losing weight and controlling his diet   I think he has good insight into some of his eating habits and is trying  Goal for next time is to be below 210

## 2020-03-12 NOTE — PATIENT INSTRUCTIONS

## 2020-03-12 NOTE — ASSESSMENT & PLAN NOTE
Total cholesterol 146 HDL 56 LDL 69 triglycerides 105    Continue current dose of statin which he is tolerating

## 2020-03-12 NOTE — PROGRESS NOTES
Assessment/Plan:     Continue current medications    No labs before next visit    Carotid stenosis, asymptomatic, right  Recent carotid study showed bilateral carotid disease less than 50% occluding bilaterally with heterogeneous slightly irregular plaque  There is no change from his previous study about a year ago  He is on a statin, aspirin, and his blood pressure is controlled on medication    Benign essential hypertension  Blood pressure is at target    Creatinine was 1 08 GFR 67  Electrolytes within normal limits    Continue current medication    Mixed conductive and sensorineural hearing loss of both ears  No change  Does not limit him from activities are socialization    Osteoarthritis  No limiting pain at this time  Continue glucosamine    Hypercholesterolemia  Total cholesterol 146 HDL 56 LDL 69 triglycerides 105    Continue current dose of statin which he is tolerating    Class 1 obesity due to excess calories without serious comorbidity with body mass index (BMI) of 34 0 to 34 9 in adult  He is working at losing weight and controlling his diet   I think he has good insight into some of his eating habits and is trying  Goal for next time is to be below 210       Diagnoses and all orders for this visit:    Benign essential hypertension    Hypercholesterolemia    Carotid stenosis, asymptomatic, right    Class 1 obesity due to excess calories without serious comorbidity with body mass index (BMI) of 34 0 to 34 9 in adult    Primary osteoarthritis involving multiple joints    Medicare annual wellness visit, subsequent    Mixed conductive and sensorineural hearing loss of both ears    Rash  -     Iodoquinol-HC (HYDROCORTISONE-IODOQUINOL) 1-1 % CREA; Apply topically 2 (two) times a day as needed (rash and itcing)          Subjective:      Patient ID: Yulia Schultz is a 76 y o  male      He is here for follow-up on his chronic conditions    He offers no particular concerns about his health and generally feels good    Is noted under the AWV he has been working on his diet  His weight is about the same and hopefully will decrease over the summer when he is more active  The following portions of the patient's history were reviewed and updated as appropriate: allergies, current medications, past family history, past medical history, past social history, past surgical history and problem list     Review of Systems   Constitutional:        Feels good   HENT: Positive for hearing loss ( mild decreased hearing but no change)  Negative for dental problem (Sees dentist every)  Eyes:        Eye exam yearly   Respiratory: Negative for cough and shortness of breath  Cardiovascular: Negative for chest pain, palpitations and leg swelling  Gastrointestinal:        Bowels are regular    Denies any dyspepsia or GERD   Genitourinary:        No problems  Does not get up at night to void   Musculoskeletal: Positive for arthralgias  He has underlying arthritic conditions and previous shoulder surgeries but is not having anything limiting at this time it is the significance   Skin: Positive for rash ( armpits and groin which is mildly red and itchy and he had in the past and used cream for which he now ran out of and is asking for refill)  Neurological: Negative for dizziness, tremors, numbness and headaches  Psychiatric/Behavioral: Negative for confusion, decreased concentration, dysphoric mood and sleep disturbance ( sleeps well)  The patient is not nervous/anxious  Objective:      /80 (BP Location: Left arm, Patient Position: Sitting, Cuff Size: Adult)   Pulse 84   Temp (!) 97 3 °F (36 3 °C) (Tympanic)   Resp 14   Ht 5' 7" (1 702 m)   Wt 98 9 kg (218 lb)   SpO2 95%   BMI 34 14 kg/m²          Physical Exam   Constitutional: He is oriented to person, place, and time  He appears well-developed  No distress  Eyes: Pupils are equal, round, and reactive to light   Conjunctivae are normal  Neck: Neck supple  No JVD present  No thyromegaly present  Cardiovascular: Normal rate and regular rhythm  No murmur heard  No carotid bruits appreciated   Pulmonary/Chest: Effort normal and breath sounds normal    Musculoskeletal: He exhibits no edema  Lymphadenopathy:     He has no cervical adenopathy  Neurological: He is alert and oriented to person, place, and time  Skin: Rash (Gets some rash in his axilla in his groin that is mildly erythematous and itches) noted  Psychiatric: He has a normal mood and affect  His behavior is normal  Judgment and thought content normal    Nursing note and vitals reviewed

## 2020-03-12 NOTE — PROGRESS NOTES
Assessment and Plan:     Depression screen was negative    Up-to-date on immunizations including Shingrix vaccine x2, pneumococcal vaccine x2, Tdap, and current season flu shot    Colonoscopy was totally normal in 2014 and he is not due again till 2024    No particular risk factors were identified    Problem List Items Addressed This Visit        Cardiovascular and Mediastinum    Benign essential hypertension - Primary    Carotid stenosis, asymptomatic, right       Nervous and Auditory    Mixed conductive and sensorineural hearing loss of both ears       Musculoskeletal and Integument    Osteoarthritis       Other    Hypercholesterolemia    Class 1 obesity due to excess calories without serious comorbidity with body mass index (BMI) of 34 0 to 34 9 in adult    Medicare annual wellness visit, subsequent        BMI Counseling: Body mass index is 34 14 kg/m²  The BMI is above normal  Nutrition recommendations include reducing portion sizes, 3-5 servings of fruits/vegetables daily and moderation in carbohydrate intake  Exercise recommendations include exercising 3-5 times per week  He has been trying to decrease overall calories - has reduced size of helpings  Will try to wait before second helping 10-15 minutes       Preventive health issues were discussed with patient, and age appropriate screening tests were ordered as noted in patient's After Visit Summary  Personalized health advice and appropriate referrals for health education or preventive services given if needed, as noted in patient's After Visit Summary  History of Present Illness:     Patient presents for Medicare Annual Wellness visit    He continues to live independently with his wife and their cat  They live in a 2 story house but have in addition that allows them to have a 1st floor master suite  They do have railings on the stairways and he has no trouble getting and down the as needed    His diet is relatively balanced and he has cut back on the amount of food he is eating since we last at together  Drinks 1-2 cups coffee each morning  He rarely drinks an alcoholic beverage  He continues to drive without difficulty      Patient Care Team:  Evelia Cadena MD as PCP - General     Problem List:     Patient Active Problem List   Diagnosis    Benign essential hypertension    Carotid stenosis, asymptomatic, right    First degree AV block    Hypercholesterolemia    Class 1 obesity due to excess calories without serious comorbidity with body mass index (BMI) of 34 0 to 34 9 in adult    Seborrheic keratoses    Chronic right shoulder pain    Cataracts, bilateral    Osteoarthritis    Medicare annual wellness visit, subsequent    Mixed conductive and sensorineural hearing loss of both ears      Past Medical and Surgical History:     Past Medical History:   Diagnosis Date    Allergy     last assessed - [id number]    Dyspepsia     last assessed - [id number]    Hyperlipidemia     Hypertension     Onychomycosis     last assessed - [id number]    Plantar fasciitis      Past Surgical History:   Procedure Laterality Date    COLONOSCOPY  2004    complete colonoscopy    NEUROPLASTY / TRANSPOSITION MEDIAN NERVE AT CARPAL TUNNEL BILATERAL  2002    ROTATOR CUFF REPAIR Bilateral     Right - Resolved - Aug 2004; Left - Resolved - Feb 2006      Family History:     Family History   Problem Relation Age of Onset    Crohn's disease Mother       Social History:        Social History     Socioeconomic History    Marital status: /Civil Union     Spouse name: Not on file    Number of children: Not on file    Years of education: Not on file    Highest education level: Not on file   Occupational History    Not on file   Social Needs    Financial resource strain: Not on file    Food insecurity:     Worry: Not on file     Inability: Not on file    Transportation needs:     Medical: Not on file     Non-medical: Not on file   Tobacco Use    Smoking status: Never Smoker    Smokeless tobacco: Never Used   Substance and Sexual Activity    Alcohol use: No    Drug use: No    Sexual activity: Not on file   Lifestyle    Physical activity:     Days per week: Not on file     Minutes per session: Not on file    Stress: Not on file   Relationships    Social connections:     Talks on phone: Not on file     Gets together: Not on file     Attends Congregational service: Not on file     Active member of club or organization: Not on file     Attends meetings of clubs or organizations: Not on file     Relationship status: Not on file    Intimate partner violence:     Fear of current or ex partner: Not on file     Emotionally abused: Not on file     Physically abused: Not on file     Forced sexual activity: Not on file   Other Topics Concern    Not on file   Social History Narrative    Not on file      Medications and Allergies:     Current Outpatient Medications   Medication Sig Dispense Refill    amoxicillin (AMOXIL) 500 mg capsule   2    Ascorbic Acid (VITAMIN C) 100 MG tablet Take 100 mg by mouth daily      aspirin 81 MG tablet Take 1 tablet by mouth daily      chlorthalidone 25 mg tablet TAKE 1 TABLET BY MOUTH EVERY DAY 90 tablet 3    cholecalciferol (VITAMIN D3) 1,000 units tablet Take 500 Units by mouth daily      glucosamine 500 MG CAPS capsule Take 500 mg by mouth      losartan (COZAAR) 50 mg tablet TAKE 1 TABLET BY MOUTH EVERY DAY 90 tablet 3    Multiple Vitamins-Minerals (MULTIVITAMIN WITH MINERALS) tablet Take 1 tablet by mouth daily      Omega-3 Fatty Acids (FISH OIL) 1,000 mg Take 1,000 mg by mouth daily      RESTASIS 0 05 % ophthalmic emulsion INSTILL 1 DROP BY OPHTHALMIC ROUTE 2 TIMES EVERY DAY INTO BOTH EYES AS NEEDED  4    silver sulfadiazine (SILVADENE,SSD) 1 % cream Apply topically daily 50 g 0    simvastatin (ZOCOR) 40 mg tablet TAKE 1 TABLET BY MOUTH EVERY DAY 90 tablet 3     No current facility-administered medications for this visit        No Known Allergies   Immunizations:     Immunization History   Administered Date(s) Administered    Influenza Split High Dose Preservative Free IM 10/26/2015, 09/09/2016, 09/19/2017    Influenza TIV (IM) 10/01/1999    Influenza, high dose seasonal 0 5 mL 08/30/2018, 09/05/2019    Pneumococcal Conjugate 13-Valent 07/16/2015    Pneumococcal Polysaccharide PPV23 1945    Td (adult), adsorbed 05/25/2000, 07/01/2005    Tdap 04/18/2017    Zoster 1945    Zoster Vaccine Recombinant 04/30/2019, 09/12/2019      Health Maintenance:         Topic Date Due    CRC Screening: Colonoscopy  08/08/2024    Hepatitis C Screening  Completed         Topic Date Due    Pneumococcal Vaccine: 65+ Years (2 of 2 - PPSV23) 07/16/2016      Medicare Health Risk Assessment:     There were no vitals taken for this visit  Emmanuel Mayfield is here for his Subsequent Wellness visit  Health Risk Assessment:   Patient rates overall health as very good  Patient feels that their physical health rating is same  Eyesight was rated as same  Hearing was rated as same  Patient feels that their emotional and mental health rating is same  Pain experienced in the last 7 days has been none  Patient states that he has experienced no weight loss or gain in last 6 months  Depression Screening:   PHQ-2 Score: 0      Fall Risk Screening: In the past year, patient has experienced: no history of falling in past year      Home Safety:  Patient does not have trouble with stairs inside or outside of their home  Patient has working smoke alarms and has working carbon monoxide detector  Home safety hazards include: none  Nutrition:   Current diet is Limited junk food and Regular  Medications:   Patient is currently taking over-the-counter supplements  OTC medications include: see medication list  Patient is able to manage medications       Activities of Daily Living (ADLs)/Instrumental Activities of Daily Living (IADLs):   Walk and transfer into and out of bed and chair?: Yes  Dress and groom yourself?: Yes    Bathe or shower yourself?: Yes    Feed yourself? Yes  Do your laundry/housekeeping?: Yes  Manage your money, pay your bills and track your expenses?: Yes  Make your own meals?: Yes    Do your own shopping?: Yes    Previous Hospitalizations:   Any hospitalizations or ED visits within the last 12 months?: No      Advance Care Planning:   Living will: Yes    Durable POA for healthcare:  Yes    Advanced directive: Yes    End of Life Decisions reviewed with patient: Yes      Comments: POA is his wife - then his daughter for medical / they have a friend as secondary     Cognitive Screening:   Provider or family/friend/caregiver concerned regarding cognition?: No    PREVENTIVE SCREENINGS      Cardiovascular Screening:    General: Screening Not Indicated and History Lipid Disorder      Diabetes Screening:     General: Screening Current      Colorectal Cancer Screening:     General: Screening Current      Prostate Cancer Screening:    General: Screening Current      Abdominal Aortic Aneurysm (AAA) Screening:    Risk factors include: age between 73-67 yo        Lung Cancer Screening:     General: Screening Not Indicated      Hepatitis C Screening:    General: Screening Current      Jeanmarie Leyden, MD

## 2020-03-12 NOTE — ASSESSMENT & PLAN NOTE
Recent carotid study showed bilateral carotid disease less than 50% occluding bilaterally with heterogeneous slightly irregular plaque  There is no change from his previous study about a year ago      He is on a statin, aspirin, and his blood pressure is controlled on medication

## 2020-03-17 ENCOUNTER — TELEPHONE (OUTPATIENT)
Dept: FAMILY MEDICINE CLINIC | Facility: CLINIC | Age: 75
End: 2020-03-17

## 2020-03-17 NOTE — TELEPHONE ENCOUNTER
Patient's pharmacy quoted a price of $7000 00 for the lodoquinol-HC 1-1% and it is out of stock  Patient would like to have the dermazene (hydrocortisone-idoquinol) cream ordered by Dr Neli Villar several years ago  Patient uses the [facility]

## 2020-03-18 NOTE — TELEPHONE ENCOUNTER
I found a iodoquinol HC 1% cream but not sure it is different or cheaper  Does pharmacy have any suggestions?

## 2020-03-20 DIAGNOSIS — R21 RASH: Primary | ICD-10-CM

## 2020-03-20 RX ORDER — CLOTRIMAZOLE AND BETAMETHASONE DIPROPIONATE 10; .64 MG/G; MG/G
CREAM TOPICAL 2 TIMES DAILY PRN
Qty: 30 G | Refills: 2 | Status: SHIPPED | OUTPATIENT
Start: 2020-03-20

## 2020-03-20 NOTE — TELEPHONE ENCOUNTER
Spoke to the pharmacist and she stated that the insurance company is probably looking for the patient to try antifungal with a steroid   Will that work for this patient/right treatment

## 2020-03-20 NOTE — TELEPHONE ENCOUNTER
We can try lotrisone cream and see if helps  If fails then can try to get preapproved  I will send in

## 2020-05-09 DIAGNOSIS — E78.00 HYPERCHOLESTEROLEMIA: ICD-10-CM

## 2020-05-11 RX ORDER — SIMVASTATIN 40 MG
TABLET ORAL
Qty: 90 TABLET | Refills: 3 | Status: SHIPPED | OUTPATIENT
Start: 2020-05-11 | End: 2021-05-14

## 2020-09-17 ENCOUNTER — OFFICE VISIT (OUTPATIENT)
Dept: FAMILY MEDICINE CLINIC | Facility: CLINIC | Age: 75
End: 2020-09-17
Payer: MEDICARE

## 2020-09-17 VITALS
HEART RATE: 76 BPM | BODY MASS INDEX: 33.59 KG/M2 | RESPIRATION RATE: 16 BRPM | SYSTOLIC BLOOD PRESSURE: 130 MMHG | TEMPERATURE: 97.5 F | WEIGHT: 214 LBS | DIASTOLIC BLOOD PRESSURE: 80 MMHG | HEIGHT: 67 IN

## 2020-09-17 DIAGNOSIS — I65.21 CAROTID STENOSIS, ASYMPTOMATIC, RIGHT: ICD-10-CM

## 2020-09-17 DIAGNOSIS — H90.6 MIXED CONDUCTIVE AND SENSORINEURAL HEARING LOSS OF BOTH EARS: ICD-10-CM

## 2020-09-17 DIAGNOSIS — I10 BENIGN ESSENTIAL HYPERTENSION: Primary | ICD-10-CM

## 2020-09-17 DIAGNOSIS — M15.9 PRIMARY OSTEOARTHRITIS INVOLVING MULTIPLE JOINTS: ICD-10-CM

## 2020-09-17 DIAGNOSIS — E78.00 HYPERCHOLESTEROLEMIA: ICD-10-CM

## 2020-09-17 DIAGNOSIS — E66.09 CLASS 1 OBESITY DUE TO EXCESS CALORIES WITHOUT SERIOUS COMORBIDITY WITH BODY MASS INDEX (BMI) OF 34.0 TO 34.9 IN ADULT: ICD-10-CM

## 2020-09-17 DIAGNOSIS — Z23 ENCOUNTER FOR IMMUNIZATION: ICD-10-CM

## 2020-09-17 DIAGNOSIS — Z12.5 PROSTATE CANCER SCREENING: ICD-10-CM

## 2020-09-17 PROCEDURE — G0008 ADMIN INFLUENZA VIRUS VAC: HCPCS

## 2020-09-17 PROCEDURE — 99214 OFFICE O/P EST MOD 30 MIN: CPT | Performed by: FAMILY MEDICINE

## 2020-09-17 PROCEDURE — 90662 IIV NO PRSV INCREASED AG IM: CPT

## 2020-09-17 NOTE — PROGRESS NOTES
Chief Complaint   Patient presents with    Follow-up     no concerns at this time     Health Maintenance   Topic Date Due    Pneumococcal Vaccine: 65+ Years (2 of 2 - PPSV23) 07/16/2016    Influenza Vaccine  07/01/2020    Depression Screening PHQ  03/12/2021    BMI: Followup Plan  03/12/2021    BMI: Adult  03/12/2021    Fall Risk  03/12/2021    Medicare Annual Wellness Visit (AWV)  03/12/2021    Colorectal Cancer Screening  08/08/2024    DTaP,Tdap,and Td Vaccines (2 - Td) 04/18/2027    Hepatitis C Screening  Completed    HIB Vaccine  Aged Out    Hepatitis B Vaccine  Aged Out    IPV Vaccine  Aged Out    Hepatitis A Vaccine  Aged Out    Meningococcal ACWY Vaccine  Aged Out    HPV Vaccine  Aged Out     Assessment/Plan:    Immunizations are up-to-date  The chart reflects that he did not have a pneumococcal 23 vaccine but he did indeed have 1 at the [facility] pharmacy at the same time of his wife after 65th birthday  We will correct this  He received a flu shot today    Continue current medications    He will get labs and urinalysis before next visit    No problem-specific Assessment & Plan notes found for this encounter  Diagnoses and all orders for this visit:    Benign essential hypertension  -     CBC; Future  -     Comprehensive metabolic panel; Future  -     Urinalysis with microscopic    Carotid stenosis, asymptomatic, right    Hypercholesterolemia  -     Comprehensive metabolic panel; Future  -     Lipid panel; Future    Class 1 obesity due to excess calories without serious comorbidity with body mass index (BMI) of 34 0 to 34 9 in adult    Primary osteoarthritis involving multiple joints    Mixed conductive and sensorineural hearing loss of both ears    Encounter for immunization  -     influenza vaccine, high-dose, PF 0 7 mL (FLUZONE HIGH-DOSE)    Prostate cancer screening  -     PSA, Total Screen; Future          Subjective:      Patient ID: Juanjo Phelan is a 76 y o  male      He is here today for follow-up of chronic conditions    His wife is with him and present throughout the visit  She is getting a flu shot today and I answered some questions she had in regard to the high-dose shot    He reports that he has been doing fine    They have remains socially distance during the [place] pandemic and mostly stayed home  They wear mask when they go out  His wife has had number of medical issues including shoulder replacement and therefore that been going to doctor's appointments and therapy    The been no significant changes in his habits  He continues to follow a reasonably healthy diet  He has lost 4 pound since last visit      The following portions of the patient's history were reviewed and updated as appropriate: allergies, current medications, past family history, past medical history, past social history, past surgical history and problem list     Review of Systems   Constitutional:        Generally feels good   HENT: Negative for dental problem and hearing loss  Eyes:        Gets periodic eye exam   Respiratory: Negative for cough, choking, chest tightness and shortness of breath  Cardiovascular: Negative for chest pain, palpitations and leg swelling  Gastrointestinal:        Bowels are regular    Denies any dyspepsia or GERD   Genitourinary:        No problems   Musculoskeletal:        Generally aches and pains but no focused or limiting problems at this time    He has a history of bilateral shoulder surgery   Skin: Negative for rash  Neurological: Negative for dizziness, tremors, light-headedness and headaches  Psychiatric/Behavioral: Negative for confusion, decreased concentration, dysphoric mood and sleep disturbance  The patient is not nervous/anxious  Objective: There were no vitals taken for this visit  Physical Exam  Vitals signs and nursing note reviewed  Constitutional:       Appearance: Normal appearance        Comments: Overweight   Eyes: Conjunctiva/sclera: Conjunctivae normal       Pupils: Pupils are equal, round, and reactive to light  Neck:      Musculoskeletal: Neck supple  Vascular: No carotid bruit  Cardiovascular:      Rate and Rhythm: Normal rate and regular rhythm  Heart sounds: No murmur  Pulmonary:      Effort: Pulmonary effort is normal       Breath sounds: Normal breath sounds  Abdominal:      Comments: Moderately protuberant   Musculoskeletal:      Right lower leg: No edema  Left lower leg: No edema  Skin:     Coloration: Skin is not jaundiced or pale  Findings: No bruising or rash  Neurological:      Mental Status: He is alert and oriented to person, place, and time  Psychiatric:         Mood and Affect: Mood normal          Behavior: Behavior normal          Thought Content:  Thought content normal          Judgment: Judgment normal

## 2020-09-17 NOTE — ASSESSMENT & PLAN NOTE
He is asymptomatic    He had full carotid studies done in January of 2020 that were stable with mild disease less than 50 percent bilaterally  This showed no change since the original screening in 2015 and subsequent carotid studies in 2016 and 2017      He takes aspirin 81 milligrams daily  We control his blood pressure

## 2020-09-17 NOTE — ASSESSMENT & PLAN NOTE
Blood pressure is at target on current medication which include chlorthalidone and losartan    Will get blood work before next visit and urinalysis

## 2020-09-17 NOTE — ASSESSMENT & PLAN NOTE
Target was to lose 5 pounds by this visit and he lost 4 pounds  He will continue to work on his diet and try to lose 5 pounds again before next visit

## 2020-11-06 DIAGNOSIS — I10 ESSENTIAL HYPERTENSION: ICD-10-CM

## 2020-11-09 RX ORDER — CHLORTHALIDONE 25 MG/1
TABLET ORAL
Qty: 270 TABLET | Refills: 3 | Status: SHIPPED | OUTPATIENT
Start: 2020-11-09 | End: 2022-04-06 | Stop reason: SDUPTHER

## 2020-12-19 DIAGNOSIS — I10 BENIGN ESSENTIAL HYPERTENSION: ICD-10-CM

## 2020-12-21 RX ORDER — LOSARTAN POTASSIUM 50 MG/1
TABLET ORAL
Qty: 90 TABLET | Refills: 3 | Status: SHIPPED | OUTPATIENT
Start: 2020-12-21 | End: 2022-02-14

## 2021-01-16 ENCOUNTER — IMMUNIZATIONS (OUTPATIENT)
Dept: FAMILY MEDICINE CLINIC | Facility: HOSPITAL | Age: 76
End: 2021-01-16

## 2021-01-16 DIAGNOSIS — Z23 ENCOUNTER FOR IMMUNIZATION: Primary | ICD-10-CM

## 2021-01-16 PROCEDURE — 0001A SARS-COV-2 / COVID-19 MRNA VACCINE (PFIZER-BIONTECH) 30 MCG: CPT

## 2021-01-16 PROCEDURE — 91300 SARS-COV-2 / COVID-19 MRNA VACCINE (PFIZER-BIONTECH) 30 MCG: CPT

## 2021-02-05 ENCOUNTER — IMMUNIZATIONS (OUTPATIENT)
Dept: FAMILY MEDICINE CLINIC | Facility: HOSPITAL | Age: 76
End: 2021-02-05

## 2021-02-05 DIAGNOSIS — Z23 ENCOUNTER FOR IMMUNIZATION: Primary | ICD-10-CM

## 2021-02-05 PROCEDURE — 0002A SARS-COV-2 / COVID-19 MRNA VACCINE (PFIZER-BIONTECH) 30 MCG: CPT

## 2021-02-05 PROCEDURE — 91300 SARS-COV-2 / COVID-19 MRNA VACCINE (PFIZER-BIONTECH) 30 MCG: CPT

## 2021-03-08 ENCOUNTER — LAB (OUTPATIENT)
Dept: LAB | Facility: CLINIC | Age: 76
End: 2021-03-08
Payer: MEDICARE

## 2021-03-08 DIAGNOSIS — Z12.5 PROSTATE CANCER SCREENING: ICD-10-CM

## 2021-03-08 DIAGNOSIS — I10 BENIGN ESSENTIAL HYPERTENSION: ICD-10-CM

## 2021-03-08 DIAGNOSIS — E78.00 HYPERCHOLESTEROLEMIA: ICD-10-CM

## 2021-03-08 LAB
ALBUMIN SERPL BCP-MCNC: 4 G/DL (ref 3.5–5)
ALP SERPL-CCNC: 57 U/L (ref 46–116)
ALT SERPL W P-5'-P-CCNC: 23 U/L (ref 12–78)
ANION GAP SERPL CALCULATED.3IONS-SCNC: 5 MMOL/L (ref 4–13)
AST SERPL W P-5'-P-CCNC: 18 U/L (ref 5–45)
BACTERIA UR QL AUTO: NORMAL /HPF
BILIRUB SERPL-MCNC: 0.59 MG/DL (ref 0.2–1)
BILIRUB UR QL STRIP: NEGATIVE
BUN SERPL-MCNC: 22 MG/DL (ref 5–25)
CALCIUM SERPL-MCNC: 9.1 MG/DL (ref 8.3–10.1)
CHLORIDE SERPL-SCNC: 103 MMOL/L (ref 100–108)
CHOLEST SERPL-MCNC: 144 MG/DL (ref 50–200)
CLARITY UR: CLEAR
CO2 SERPL-SCNC: 31 MMOL/L (ref 21–32)
COLOR UR: YELLOW
CREAT SERPL-MCNC: 1.06 MG/DL (ref 0.6–1.3)
ERYTHROCYTE [DISTWIDTH] IN BLOOD BY AUTOMATED COUNT: 11.8 % (ref 11.6–15.1)
GFR SERPL CREATININE-BSD FRML MDRD: 68 ML/MIN/1.73SQ M
GLUCOSE P FAST SERPL-MCNC: 98 MG/DL (ref 65–99)
GLUCOSE UR STRIP-MCNC: NEGATIVE MG/DL
HCT VFR BLD AUTO: 42 % (ref 36.5–49.3)
HDLC SERPL-MCNC: 58 MG/DL
HGB BLD-MCNC: 14.1 G/DL (ref 12–17)
HGB UR QL STRIP.AUTO: NEGATIVE
KETONES UR STRIP-MCNC: NEGATIVE MG/DL
LDLC SERPL CALC-MCNC: 64 MG/DL (ref 0–100)
LEUKOCYTE ESTERASE UR QL STRIP: NEGATIVE
MCH RBC QN AUTO: 30.2 PG (ref 26.8–34.3)
MCHC RBC AUTO-ENTMCNC: 33.6 G/DL (ref 31.4–37.4)
MCV RBC AUTO: 90 FL (ref 82–98)
NITRITE UR QL STRIP: NEGATIVE
NON-SQ EPI CELLS URNS QL MICRO: NORMAL /HPF
NONHDLC SERPL-MCNC: 86 MG/DL
PH UR STRIP.AUTO: 5.5 [PH]
PLATELET # BLD AUTO: 241 THOUSANDS/UL (ref 149–390)
PMV BLD AUTO: 9.6 FL (ref 8.9–12.7)
POTASSIUM SERPL-SCNC: 3.3 MMOL/L (ref 3.5–5.3)
PROT SERPL-MCNC: 7.2 G/DL (ref 6.4–8.2)
PROT UR STRIP-MCNC: NEGATIVE MG/DL
PSA SERPL-MCNC: 2.7 NG/ML (ref 0–4)
RBC # BLD AUTO: 4.67 MILLION/UL (ref 3.88–5.62)
RBC #/AREA URNS AUTO: NORMAL /HPF
SODIUM SERPL-SCNC: 139 MMOL/L (ref 136–145)
SP GR UR STRIP.AUTO: 1.02 (ref 1–1.03)
TRIGL SERPL-MCNC: 110 MG/DL
UROBILINOGEN UR QL STRIP.AUTO: 0.2 E.U./DL
WBC # BLD AUTO: 6.16 THOUSAND/UL (ref 4.31–10.16)
WBC #/AREA URNS AUTO: NORMAL /HPF

## 2021-03-08 PROCEDURE — G0103 PSA SCREENING: HCPCS

## 2021-03-08 PROCEDURE — 85027 COMPLETE CBC AUTOMATED: CPT

## 2021-03-08 PROCEDURE — 80061 LIPID PANEL: CPT

## 2021-03-08 PROCEDURE — 80053 COMPREHEN METABOLIC PANEL: CPT

## 2021-03-08 PROCEDURE — 36415 COLL VENOUS BLD VENIPUNCTURE: CPT

## 2021-03-08 PROCEDURE — 81001 URINALYSIS AUTO W/SCOPE: CPT | Performed by: FAMILY MEDICINE

## 2021-03-18 ENCOUNTER — OFFICE VISIT (OUTPATIENT)
Dept: FAMILY MEDICINE CLINIC | Facility: CLINIC | Age: 76
End: 2021-03-18
Payer: MEDICARE

## 2021-03-18 VITALS
DIASTOLIC BLOOD PRESSURE: 74 MMHG | RESPIRATION RATE: 16 BRPM | HEIGHT: 67 IN | HEART RATE: 88 BPM | WEIGHT: 218 LBS | BODY MASS INDEX: 34.21 KG/M2 | SYSTOLIC BLOOD PRESSURE: 136 MMHG | TEMPERATURE: 99.6 F

## 2021-03-18 DIAGNOSIS — I10 BENIGN ESSENTIAL HYPERTENSION: Primary | ICD-10-CM

## 2021-03-18 DIAGNOSIS — E87.6 HYPOKALEMIA: ICD-10-CM

## 2021-03-18 DIAGNOSIS — E78.00 HYPERCHOLESTEROLEMIA: ICD-10-CM

## 2021-03-18 DIAGNOSIS — I65.23 CAROTID STENOSIS, ASYMPTOMATIC, BILATERAL: ICD-10-CM

## 2021-03-18 DIAGNOSIS — M15.9 PRIMARY OSTEOARTHRITIS INVOLVING MULTIPLE JOINTS: ICD-10-CM

## 2021-03-18 DIAGNOSIS — G89.29 CHRONIC RIGHT SHOULDER PAIN: ICD-10-CM

## 2021-03-18 DIAGNOSIS — Z00.00 MEDICARE ANNUAL WELLNESS VISIT, SUBSEQUENT: ICD-10-CM

## 2021-03-18 DIAGNOSIS — E66.09 CLASS 1 OBESITY DUE TO EXCESS CALORIES WITHOUT SERIOUS COMORBIDITY WITH BODY MASS INDEX (BMI) OF 34.0 TO 34.9 IN ADULT: ICD-10-CM

## 2021-03-18 DIAGNOSIS — M25.511 CHRONIC RIGHT SHOULDER PAIN: ICD-10-CM

## 2021-03-18 DIAGNOSIS — H90.6 MIXED CONDUCTIVE AND SENSORINEURAL HEARING LOSS OF BOTH EARS: ICD-10-CM

## 2021-03-18 PROCEDURE — 99213 OFFICE O/P EST LOW 20 MIN: CPT | Performed by: FAMILY MEDICINE

## 2021-03-18 PROCEDURE — 1123F ACP DISCUSS/DSCN MKR DOCD: CPT | Performed by: FAMILY MEDICINE

## 2021-03-18 PROCEDURE — G0439 PPPS, SUBSEQ VISIT: HCPCS | Performed by: FAMILY MEDICINE

## 2021-03-18 NOTE — PROGRESS NOTES
Assessment and Plan:     He has had both doses the COVID vaccine      Other immunizations are complete up-to-date    Screenings are up-to-date    Problem List Items Addressed This Visit        Cardiovascular and Mediastinum    Benign essential hypertension - Primary    Carotid stenosis, asymptomatic, bilateral       Nervous and Auditory    Mixed conductive and sensorineural hearing loss of both ears       Musculoskeletal and Integument    Osteoarthritis       Other    Hypercholesterolemia    Class 1 obesity due to excess calories without serious comorbidity with body mass index (BMI) of 34 0 to 34 9 in adult    Medicare annual wellness visit, subsequent        BMI Counseling: Body mass index is 34 14 kg/m²  The BMI is above normal  Nutrition recommendations include decreasing portion sizes, encouraging healthy choices of fruits and vegetables, decreasing fast food intake, moderation in carbohydrate intake and increasing intake of lean protein  Exercise recommendations include exercising 3-5 times per week  No pharmacotherapy was ordered  Patient referred to PCP due to patient being overweight  He notes that during [place] they have gotten into the habit of getting more takeout food than they normally would  This is mostly burgers and things of such  He realizes that may need to stop doing this  Normally they would go to the diner once a week but have not been doing this and instead are getting food as noted above several times a week  He plans on becoming more active in walking or as the weather gets nicer   Goal is to get below 210 lb but the time I next see him and long-term goal would be to get below 200 lb      Depression Screening and Follow-up Plan: Patient's depression screening was positive with a PHQ-2 score of 0  Clincally patient does not have depression  No treatment is required         Preventive health issues were discussed with patient, and age appropriate screening tests were ordered as noted in patient's After Visit Summary  Personalized health advice and appropriate referrals for health education or preventive services given if needed, as noted in patient's After Visit Summary  History of Present Illness:     Patient presents for Medicare Annual Wellness visit     patient continues to live independently with his wife in a two-story home  They have a master bedroom and master bath on the 1st floor  He is able to go up and down the stairs without difficulty and there laundry is in the basement  He continues to drive without problems  They do have an attached garage  He drinks coffee about 2 cups a day  He drinks 2-3 beers a year  Diet is not been as good as it had been because he and his wife have been getting takeout food several times a week compared to prior to [facility] when they would eat at home all the time and go out maybe once a week  He realizes he needs to improve the quality of his food  He also will resume exercising in the sense of walking regularly when the weather gets nicer      Patient Care Team:  Farideh Olson MD as PCP - General  Sulema Dobbs DPM (Podiatry)     Problem List:     Patient Active Problem List   Diagnosis    Benign essential hypertension    Carotid stenosis, asymptomatic, bilateral    First degree AV block    Hypercholesterolemia    Class 1 obesity due to excess calories without serious comorbidity with body mass index (BMI) of 34 0 to 34 9 in adult    Seborrheic keratoses    Chronic right shoulder pain    Cataracts, bilateral    Osteoarthritis    Medicare annual wellness visit, subsequent    Mixed conductive and sensorineural hearing loss of both ears      Past Medical and Surgical History:     Past Medical History:   Diagnosis Date    Allergy     last assessed - [id number]    Dyspepsia     last assessed - [id number]    Hyperlipidemia     Hypertension     Onychomycosis     last assessed - [id number]    Plantar fasciitis      Past Surgical History:   Procedure Laterality Date    COLONOSCOPY  2004    complete colonoscopy    NEUROPLASTY / TRANSPOSITION MEDIAN NERVE AT CARPAL TUNNEL BILATERAL  2002    ROTATOR CUFF REPAIR Bilateral     Right - Resolved - Aug 2004; Left - Resolved - Feb 2006      Family History:     Family History   Problem Relation Age of Onset    Crohn's disease Mother       Social History:        Social History     Socioeconomic History    Marital status: /Civil Union     Spouse name: Not on file    Number of children: Not on file    Years of education: Not on file    Highest education level: Not on file   Occupational History    Not on file   Social Needs    Financial resource strain: Not on file    Food insecurity     Worry: Not on file     Inability: Not on file   [employer] needs     Medical: Not on file     Non-medical: Not on file   Tobacco Use    Smoking status: Never Smoker    Smokeless tobacco: Never Used   Substance and Sexual Activity    Alcohol use: No    Drug use: No    Sexual activity: Not on file   Lifestyle    Physical activity     Days per week: Not on file     Minutes per session: Not on file    Stress: Not on file   Relationships    Social connections     Talks on phone: Not on file     Gets together: Not on file     Attends [employer] service: Not on file     Active member of club or organization: Not on file     Attends meetings of clubs or organizations: Not on file     Relationship status: Not on file    Intimate partner violence     Fear of current or ex partner: Not on file     Emotionally abused: Not on file     Physically abused: Not on file     Forced sexual activity: Not on file   Other Topics Concern    Not on file   Social History Narrative    Not on file      Medications and Allergies:     Current Outpatient Medications   Medication Sig Dispense Refill    amoxicillin (AMOXIL) 500 mg capsule   2    Ascorbic Acid (VITAMIN C) 100 MG tablet Take 100 mg by mouth daily      aspirin 81 MG tablet Take 1 tablet by mouth daily      chlorthalidone 25 mg tablet TAKE 1 TABLET BY MOUTH EVERY  tablet 3    cholecalciferol (VITAMIN D3) 1,000 units tablet Take 500 Units by mouth daily      clotrimazole-betamethasone (LOTRISONE) 1-0 05 % cream Apply topically 2 (two) times a day as needed (rash) 30 g 2    glucosamine 500 MG CAPS capsule Take 500 mg by mouth      losartan (COZAAR) 50 mg tablet TAKE 1 TABLET BY MOUTH EVERY DAY 90 tablet 3    Multiple Vitamins-Minerals (MULTIVITAMIN WITH MINERALS) tablet Take 1 tablet by mouth daily      Omega-3 Fatty Acids (FISH OIL) 1,000 mg Take 1,000 mg by mouth daily      RESTASIS 0 05 % ophthalmic emulsion INSTILL 1 DROP BY OPHTHALMIC ROUTE 2 TIMES EVERY DAY INTO BOTH EYES AS NEEDED  4    simvastatin (ZOCOR) 40 mg tablet TAKE 1 TABLET BY MOUTH EVERY DAY 90 tablet 3     No current facility-administered medications for this visit  No Known Allergies   Immunizations:     Immunization History   Administered Date(s) Administered    Influenza Split High Dose Preservative Free IM 10/26/2015, 09/09/2016, 09/19/2017    Influenza, high dose seasonal 0 7 mL 08/30/2018, 09/05/2019, 09/17/2020    Influenza, seasonal, injectable 10/01/1999    Pneumococcal Conjugate 13-Valent 07/16/2015    Pneumococcal Polysaccharide PPV23 1945    SARS-CoV-2 / COVID-19 mRNA IM (Pfizer-BioNTech) 01/16/2021, 02/05/2021    Td (adult), adsorbed 05/25/2000, 07/01/2005    Tdap 04/18/2017    Zoster 1945    Zoster Vaccine Recombinant 04/30/2019, 09/12/2019      Health Maintenance:         Topic Date Due    Colorectal Cancer Screening  08/08/2024    Hepatitis C Screening  Completed         Topic Date Due    Pneumococcal Vaccine: 65+ Years (2 of 2 - PPSV23) 07/16/2016      Medicare Health Risk Assessment:     There were no vitals taken for this visit  Maria C Chappell is here for his Subsequent Wellness visit   Last Medicare Wellness visit information reviewed, patient interviewed and updates made to the record today  Health Risk Assessment:   Patient rates overall health as very good  Patient feels that their physical health rating is same  Patient is very satisfied with their life  Eyesight was rated as same  Hearing was rated as slightly worse  Patient feels that their emotional and mental health rating is same  Patients states they are never, rarely angry  Patient states they are sometimes unusually tired/fatigued  Pain experienced in the last 7 days has been none  Patient states that he has experienced no weight loss or gain in last 6 months  Depression Screening:   PHQ-2 Score: 0      Fall Risk Screening: In the past year, patient has experienced: no history of falling in past year      Home Safety:  Patient does not have trouble with stairs inside or outside of their home  Patient has working smoke alarms and has working carbon monoxide detector  Home safety hazards include: none  Nutrition:   Current diet is Regular  Medications:   Patient is currently taking over-the-counter supplements  OTC medications include: see medication list  Patient is able to manage medications  Activities of Daily Living (ADLs)/Instrumental Activities of Daily Living (IADLs):   Walk and transfer into and out of bed and chair?: Yes  Dress and groom yourself?: Yes    Bathe or shower yourself?: Yes    Feed yourself? Yes  Do your laundry/housekeeping?: Yes  Manage your money, pay your bills and track your expenses?: Yes  Make your own meals?: Yes    Do your own shopping?: Yes    Previous Hospitalizations:   Any hospitalizations or ED visits within the last 12 months?: No      Advance Care Planning:   Living will: Yes    Durable POA for healthcare:  Yes    Advanced directive: Yes    End of Life Decisions reviewed with patient: Yes      Comments: Wife is POA then his daughter Juancarlos Licea  They have discussed their wishes    PREVENTIVE SCREENINGS      Cardiovascular Screening:    General: Screening Not Indicated and History Lipid Disorder      Diabetes Screening:     General: Screening Current      Colorectal Cancer Screening:     General: Screening Current      Prostate Cancer Screening:    General: Screening Not Indicated      Abdominal Aortic Aneurysm (AAA) Screening:    Risk factors include: age between 73-67 yo        Lung Cancer Screening:     General: Screening Not Indicated      Hepatitis C Screening:    General: Screening Current    Screening, Brief Intervention, and Referral to Treatment (SBIRT)    Screening      AUDIT-C Screenin) How often did you have a drink containing alcohol in the past year? 2 to 4 times a month  2) How many drinks did you have on a typical day when you were drinking in the past year?  1 to 2  3) How often did you have 6 or more drinks on one occasion in the past year? never    AUDIT-C Score: 2  Interpretation: Score 0-3 (male): Negative screen for alcohol misuse    Single Item Drug Screening:  How often have you used an illegal drug (including marijuana) or a prescription medication for non-medical reasons in the past year? never    Single Item Drug Screen Score: 0  Interpretation: Negative screen for possible drug use disorder      Michael Michelle MD

## 2021-03-18 NOTE — ASSESSMENT & PLAN NOTE
Total cholesterol 144 HDL 58 LDL 64 triglycerides 110     LFTs normal    Continue current statin dose

## 2021-03-18 NOTE — ASSESSMENT & PLAN NOTE
This is likely due to his chlorthalidone which he takes for his blood pressure    He is added potassium to his diet in the foods that he is eating  He also showed me a over-the-counter potassium supplement pill that his wife takes that has 50 mg of potassium and I told him it would be all right to do this  He will get a blood test in 1 month to recheck the BMP      If his potassium remains low then we may use a prescription supplement

## 2021-03-18 NOTE — ASSESSMENT & PLAN NOTE
Carotid study in 9 January 2020 showed no change  There was bilateral disease with heterogenicity and irregularity of the plaque    He is on aspirin, statin, and blood pressure is well controlled    Will follow-up in a couple of years with repeat study

## 2021-03-18 NOTE — PROGRESS NOTES
Chief Complaint   Patient presents with   [facility] Wellness Visit     and 6 month follow up      Health Maintenance   Topic Date Due    Pneumococcal Vaccine: 65+ Years (2 of 2 - PPSV23) 07/16/2016    BMI: Followup Plan  03/12/2021    Medicare Annual Wellness Visit (AWV)  03/12/2021    Fall Risk  03/18/2022    Depression Screening PHQ  03/18/2022    BMI: Adult  03/18/2022    Colorectal Cancer Screening  08/08/2024    DTaP,Tdap,and Td Vaccines (2 - Td) 04/18/2027    Hepatitis C Screening  Completed    Influenza Vaccine  Completed    COVID-19 Vaccine  Completed    HIB Vaccine  Aged Out    Hepatitis B Vaccine  Aged Out    IPV Vaccine  Aged Out    Hepatitis A Vaccine  Aged Out    Meningococcal ACWY Vaccine  Aged Out    HPV Vaccine  Aged Out       Assessment/Plan:     I did review the rest of his labs with him including a fasting blood sugar of 98 and PSA of 2 7 both of which were normal and stable  CBC was also non-remarkable and urinalysis non-remarkable       Continue current medications    Get a blood test in 1 month as noted below         Benign essential hypertension   Blood pressure is at target on current medication    Potassium was 3 3 and other electrolytes normal     Creatinine 1 01 GFR 68    Carotid stenosis, asymptomatic, bilateral   Carotid study in 9 January 2020 showed no change  There was bilateral disease with heterogenicity and irregularity of the plaque    He is on aspirin, statin, and blood pressure is well controlled    Will follow-up in a couple of years with repeat study    Mixed conductive and sensorineural hearing loss of both ears   No change    Hypercholesterolemia   Total cholesterol 144 HDL 58 LDL 64 triglycerides 110     LFTs normal    Continue current statin dose    Class 1 obesity due to excess calories without serious comorbidity with body mass index (BMI) of 34 0 to 34 9 in adult   See plan under quality BMI    In brief he will try to get below 210 lb before I see him next time    Chronic right shoulder pain   Persist without change  Wakes him up at night at times and needs to roll over on the other side     He has had bilateral rotator cuff surgery    If get worse or persists and disrupts his quality of life than should see orthopedic surgeon again  Hypokalemia   This is likely due to his chlorthalidone which he takes for his blood pressure    He is added potassium to his diet in the foods that he is eating  He also showed me a over-the-counter potassium supplement pill that his wife takes that has 50 mg of potassium and I told him it would be all right to do this  He will get a blood test in 1 month to recheck the BMP  If his potassium remains low then we may use a prescription supplement       Diagnoses and all orders for this visit:    Benign essential hypertension    Hypokalemia  -     Basic metabolic panel; Future    Hypercholesterolemia    Carotid stenosis, asymptomatic, bilateral    Class 1 obesity due to excess calories without serious comorbidity with body mass index (BMI) of 34 0 to 34 9 in adult    Primary osteoarthritis involving multiple joints    Medicare annual wellness visit, subsequent    Mixed conductive and sensorineural hearing loss of both ears    Chronic right shoulder pain    Other orders  -     Cancel: PNEUMOCOCCAL POLYSACCHARIDE VACCINE 23-VALENT =>1YO SQ IM          Subjective:      Patient ID: Heather Madden is a 76 y o  male       He is here today for follow-up on his chronic conditions     He reports that he has been doing well with the exception of weight gain and not exercising    He offers no new concerns      The following portions of the patient's history were reviewed and updated as appropriate: allergies, current medications, past family history, past medical history, past social history, past surgical history and problem list     Review of Systems   Constitutional:         Feels good   HENT: Positive for hearing loss ( mild hearing loss but does not impair his ability to socialize or communicate)  Negative for dental problem  Eyes:         Gets periodic eye exam   Respiratory: Negative for cough, choking, chest tightness and shortness of breath  Cardiovascular: Negative for chest pain, palpitations and leg swelling  Gastrointestinal:         Bowels are regular     Denies any dyspepsia or GERD   Genitourinary:         Has no difficulty urinating   Musculoskeletal:          He does have some persistent pain in his right shoulder  This does wake him up at night at times and he needs to roll over the other side  He is status post bilateral rotator cuff surgery  Skin: Negative for rash  Neurological: Negative for dizziness, light-headedness and headaches  Psychiatric/Behavioral: Negative for confusion, decreased concentration, dysphoric mood and sleep disturbance  The patient is not nervous/anxious  Objective:      /74 (BP Location: Left arm, Patient Position: Sitting, Cuff Size: Large)   Pulse 88   Temp 99 6 °F (37 6 °C) (Tympanic)   Resp 16   Ht 5' 7" (1 702 m)   Wt 98 9 kg (218 lb)   BMI 34 14 kg/m²          Physical Exam  Vitals signs and nursing note reviewed  Constitutional:       Appearance: Normal appearance  He is obese  Comments:  He is wearing a mask   Eyes:      General: No scleral icterus  Pupils: Pupils are equal, round, and reactive to light  Comments:  He is wearing glasses   Neck:      Musculoskeletal: Neck supple  Vascular: No carotid bruit  Cardiovascular:      Rate and Rhythm: Normal rate and regular rhythm  Pulses: Normal pulses  Heart sounds: No murmur  Pulmonary:      Effort: Pulmonary effort is normal       Breath sounds: Normal breath sounds  Abdominal:      General: Bowel sounds are normal  There is no distension  Palpations: Abdomen is soft  There is no mass  Tenderness: There is no abdominal tenderness  There is no guarding  Hernia: No hernia is present  Musculoskeletal:      Right lower leg: No edema  Left lower leg: No edema  Lymphadenopathy:      Cervical: No cervical adenopathy  Skin:     Findings: No rash  Neurological:      Mental Status: He is alert and oriented to person, place, and time  Psychiatric:         Mood and Affect: Mood normal          Behavior: Behavior normal          Thought Content:  Thought content normal          Judgment: Judgment normal

## 2021-03-18 NOTE — PATIENT INSTRUCTIONS

## 2021-03-18 NOTE — ASSESSMENT & PLAN NOTE
Persist without change  Wakes him up at night at times and needs to roll over on the other side     He has had bilateral rotator cuff surgery    If get worse or persists and disrupts his quality of life than should see orthopedic surgeon again

## 2021-03-18 NOTE — ASSESSMENT & PLAN NOTE
Blood pressure is at target on current medication    Potassium was 3 3 and other electrolytes normal     Creatinine 1 01 GFR 68

## 2021-04-15 ENCOUNTER — APPOINTMENT (OUTPATIENT)
Dept: LAB | Facility: CLINIC | Age: 76
End: 2021-04-15
Payer: MEDICARE

## 2021-04-15 DIAGNOSIS — E87.6 HYPOKALEMIA: ICD-10-CM

## 2021-04-15 LAB
ANION GAP SERPL CALCULATED.3IONS-SCNC: 6 MMOL/L (ref 4–13)
BUN SERPL-MCNC: 23 MG/DL (ref 5–25)
CALCIUM SERPL-MCNC: 9.3 MG/DL (ref 8.3–10.1)
CHLORIDE SERPL-SCNC: 105 MMOL/L (ref 100–108)
CO2 SERPL-SCNC: 31 MMOL/L (ref 21–32)
CREAT SERPL-MCNC: 1.12 MG/DL (ref 0.6–1.3)
GFR SERPL CREATININE-BSD FRML MDRD: 64 ML/MIN/1.73SQ M
GLUCOSE P FAST SERPL-MCNC: 101 MG/DL (ref 65–99)
POTASSIUM SERPL-SCNC: 3.5 MMOL/L (ref 3.5–5.3)
SODIUM SERPL-SCNC: 142 MMOL/L (ref 136–145)

## 2021-04-15 PROCEDURE — 36415 COLL VENOUS BLD VENIPUNCTURE: CPT

## 2021-04-15 PROCEDURE — 80048 BASIC METABOLIC PNL TOTAL CA: CPT

## 2021-05-14 DIAGNOSIS — E78.00 HYPERCHOLESTEROLEMIA: ICD-10-CM

## 2021-05-14 RX ORDER — SIMVASTATIN 40 MG
TABLET ORAL
Qty: 90 TABLET | Refills: 3 | Status: SHIPPED | OUTPATIENT
Start: 2021-05-14 | End: 2022-04-06 | Stop reason: SDUPTHER

## 2021-09-07 PROBLEM — H04.123 DRY EYE SYNDROME OF BOTH EYES: Status: ACTIVE | Noted: 2021-09-07

## 2021-09-09 ENCOUNTER — OFFICE VISIT (OUTPATIENT)
Dept: FAMILY MEDICINE CLINIC | Facility: CLINIC | Age: 76
End: 2021-09-09
Payer: MEDICARE

## 2021-09-09 VITALS
RESPIRATION RATE: 16 BRPM | HEART RATE: 64 BPM | TEMPERATURE: 98.7 F | HEIGHT: 67 IN | BODY MASS INDEX: 34.37 KG/M2 | DIASTOLIC BLOOD PRESSURE: 60 MMHG | WEIGHT: 219 LBS | SYSTOLIC BLOOD PRESSURE: 122 MMHG

## 2021-09-09 DIAGNOSIS — Z12.5 PROSTATE CANCER SCREENING: ICD-10-CM

## 2021-09-09 DIAGNOSIS — M15.9 PRIMARY OSTEOARTHRITIS INVOLVING MULTIPLE JOINTS: ICD-10-CM

## 2021-09-09 DIAGNOSIS — E78.00 HYPERCHOLESTEROLEMIA: ICD-10-CM

## 2021-09-09 DIAGNOSIS — H04.123 DRY EYE SYNDROME OF BOTH EYES: ICD-10-CM

## 2021-09-09 DIAGNOSIS — I10 BENIGN ESSENTIAL HYPERTENSION: Primary | ICD-10-CM

## 2021-09-09 DIAGNOSIS — I65.23 CAROTID STENOSIS, ASYMPTOMATIC, BILATERAL: ICD-10-CM

## 2021-09-09 DIAGNOSIS — E66.09 CLASS 1 OBESITY DUE TO EXCESS CALORIES WITHOUT SERIOUS COMORBIDITY WITH BODY MASS INDEX (BMI) OF 34.0 TO 34.9 IN ADULT: ICD-10-CM

## 2021-09-09 PROBLEM — H26.9 CATARACTS, BILATERAL: Status: RESOLVED | Noted: 2017-09-19 | Resolved: 2021-09-09

## 2021-09-09 PROCEDURE — 99214 OFFICE O/P EST MOD 30 MIN: CPT | Performed by: FAMILY MEDICINE

## 2021-09-09 NOTE — ASSESSMENT & PLAN NOTE
Blood pressure is at target on current medication including losartan 50 mg daily and chlorthalidone 25 mg daily

## 2021-09-09 NOTE — ASSESSMENT & PLAN NOTE
Continues to have some episodic pain in his left knee but is not limiting to his activities or quality of life    He continues on glucosamine daily

## 2021-09-09 NOTE — ASSESSMENT & PLAN NOTE
Continues no symptoms and I do not hear any bruits today new line he does take aspirin 81 mg daily and is on a statin

## 2021-09-09 NOTE — PROGRESS NOTES
Chief Complaint   Patient presents with    Follow-up     6 month follow up      Health Maintenance   Topic Date Due    Influenza Vaccine (1) 09/01/2021    Fall Risk  03/18/2022    Depression Screening PHQ  03/18/2022    Medicare Annual Wellness Visit (AWV)  03/18/2022    BMI: Followup Plan  03/18/2022    BMI: Adult  09/09/2022    Colorectal Cancer Screening  08/08/2024    DTaP,Tdap,and Td Vaccines (2 - Td or Tdap) 04/18/2027    Hepatitis C Screening  Completed    Pneumococcal Vaccine: 65+ Years  Completed    COVID-19 Vaccine  Completed    HIB Vaccine  Aged Out    Hepatitis B Vaccine  Aged Out    IPV Vaccine  Aged Out    Hepatitis A Vaccine  Aged Out    Meningococcal ACWY Vaccine  Aged Out    HPV Vaccine  Aged Out       Assessment/Plan:     he is up-to-date on his immunizations    He will watch to see when is recommended that he get a COVID booster and will do so    He will get a flu shot in October     He will get labs as ordered before next visit    Benign essential hypertension   Blood pressure is at target on current medication including losartan 50 mg daily and chlorthalidone 25 mg daily    Carotid stenosis, asymptomatic, bilateral   Continues no symptoms and I do not hear any bruits today new line he does take aspirin 81 mg daily and is on a statin    Osteoarthritis   Continues to have some episodic pain in his left knee but is not limiting to his activities or quality of life  He continues on glucosamine daily    Class 1 obesity due to excess calories without serious comorbidity with body mass index (BMI) of 34 0 to 34 9 in adult   He continues to be more compliant his diet and has increased his exercise    Will continue to work towards weight loss within ideal target of below 200 lb but would be happy if he got down to 210    Dry eye syndrome of both eyes   Uses drops including Restasis    Hypercholesterolemia   Continues on simvastatin 40 mg daily  Continues Omega 3 fish oil 1000 mg daily       Diagnoses and all orders for this visit:    Benign essential hypertension  -     CBC; Future  -     Comprehensive metabolic panel; Future  -     Magnesium; Future  -     UA (URINE) with reflex to Scope; Future    Hypercholesterolemia  -     Comprehensive metabolic panel; Future  -     Lipid panel; Future    Carotid stenosis, asymptomatic, bilateral    Class 1 obesity due to excess calories without serious comorbidity with body mass index (BMI) of 34 0 to 34 9 in adult    Primary osteoarthritis involving multiple joints    Dry eye syndrome of both eyes    Prostate cancer screening  -     PSA, Total Screen; Future          Subjective:      Patient ID: Cyril Velásquez is a 76 y o  male  He is here for follow-up on his chronic conditions     He reports he has been doing well and feels good    He has been walking regularly up to 2-3 miles several days a week when the weather permits new line he does yd work    He got hearing aids about a month ago and finds them to be helpful    Continues to live independently with his wife  Diet is good and he has been working on his weight although remains about the same      The following portions of the patient's history were reviewed and updated as appropriate: allergies, current medications, past family history, past medical history, past social history, past surgical history and problem list     Review of Systems   Constitutional:         Feels good   HENT: Positive for hearing loss ( got hearing aids)  Negative for dental problem ( continues to see dentist twice a year)  Respiratory: Negative for cough, chest tightness and shortness of breath  Cardiovascular: Negative for chest pain, palpitations and leg swelling     Gastrointestinal:         Bowels are regular    Denies any dyspepsia or GERD   Genitourinary:         No problems   No nocturia   Musculoskeletal:         Continues to have some periodic pains in his right shoulder that appears to be more his deltoid area as opposed to where had pain before with his rotator cuff issues  He has had bilateral rotator cuff surgery  Skin: Negative for rash  Neurological: Negative for dizziness, light-headedness and headaches  Psychiatric/Behavioral: Negative for confusion, decreased concentration, dysphoric mood and sleep disturbance  The patient is not nervous/anxious  Objective:      /60   Pulse 64   Temp 98 7 °F (37 1 °C) (Tympanic)   Resp 16   Ht 5' 7" (1 702 m)   Wt 99 3 kg (219 lb)   BMI 34 30 kg/m²          Physical Exam  Vitals and nursing note reviewed  Constitutional:       Comments:   He is wearing a mask   HENT:      Ears:      Comments:  Bilateral hearing aids  Neck:      Vascular: No carotid bruit  Cardiovascular:      Rate and Rhythm: Normal rate and regular rhythm  Heart sounds: No murmur heard  Pulmonary:      Effort: Pulmonary effort is normal       Breath sounds: Normal breath sounds  Musculoskeletal:      Cervical back: Neck supple  Right lower leg: No edema  Left lower leg: No edema  Lymphadenopathy:      Cervical: No cervical adenopathy  Neurological:      Mental Status: He is oriented to person, place, and time  Psychiatric:         Mood and Affect: Mood normal          Behavior: Behavior normal          Thought Content:  Thought content normal          Judgment: Judgment normal

## 2021-09-09 NOTE — ASSESSMENT & PLAN NOTE
He continues to be more compliant his diet and has increased his exercise    Will continue to work towards weight loss within ideal target of below 200 lb but would be happy if he got down to 210

## 2021-10-02 ENCOUNTER — IMMUNIZATIONS (OUTPATIENT)
Dept: FAMILY MEDICINE CLINIC | Facility: HOSPITAL | Age: 76
End: 2021-10-02

## 2021-10-02 DIAGNOSIS — Z23 ENCOUNTER FOR IMMUNIZATION: Primary | ICD-10-CM

## 2021-10-02 PROCEDURE — 0001A SARS-COV-2 / COVID-19 MRNA VACCINE (PFIZER-BIONTECH) 30 MCG: CPT

## 2021-10-02 PROCEDURE — 91300 SARS-COV-2 / COVID-19 MRNA VACCINE (PFIZER-BIONTECH) 30 MCG: CPT

## 2021-10-18 ENCOUNTER — IMMUNIZATIONS (OUTPATIENT)
Dept: FAMILY MEDICINE CLINIC | Facility: CLINIC | Age: 76
End: 2021-10-18
Payer: MEDICARE

## 2021-10-18 DIAGNOSIS — Z23 ENCOUNTER FOR IMMUNIZATION: Primary | ICD-10-CM

## 2021-10-18 PROCEDURE — G0008 ADMIN INFLUENZA VIRUS VAC: HCPCS

## 2021-10-18 PROCEDURE — 90662 IIV NO PRSV INCREASED AG IM: CPT

## 2022-02-13 DIAGNOSIS — I10 BENIGN ESSENTIAL HYPERTENSION: ICD-10-CM

## 2022-02-14 RX ORDER — LOSARTAN POTASSIUM 50 MG/1
TABLET ORAL
Qty: 90 TABLET | Refills: 3 | Status: SHIPPED | OUTPATIENT
Start: 2022-02-14 | End: 2022-04-06 | Stop reason: SDUPTHER

## 2022-03-17 ENCOUNTER — APPOINTMENT (OUTPATIENT)
Dept: LAB | Facility: CLINIC | Age: 77
End: 2022-03-17
Payer: MEDICARE

## 2022-03-17 DIAGNOSIS — I10 BENIGN ESSENTIAL HYPERTENSION: ICD-10-CM

## 2022-03-17 DIAGNOSIS — E78.00 HYPERCHOLESTEROLEMIA: ICD-10-CM

## 2022-03-17 DIAGNOSIS — Z12.5 PROSTATE CANCER SCREENING: ICD-10-CM

## 2022-03-17 LAB
ALBUMIN SERPL BCP-MCNC: 4 G/DL (ref 3.5–5)
ALP SERPL-CCNC: 60 U/L (ref 46–116)
ALT SERPL W P-5'-P-CCNC: 26 U/L (ref 12–78)
ANION GAP SERPL CALCULATED.3IONS-SCNC: 5 MMOL/L (ref 4–13)
AST SERPL W P-5'-P-CCNC: 21 U/L (ref 5–45)
BACTERIA UR QL AUTO: ABNORMAL /HPF
BILIRUB SERPL-MCNC: 0.76 MG/DL (ref 0.2–1)
BILIRUB UR QL STRIP: NEGATIVE
BUN SERPL-MCNC: 23 MG/DL (ref 5–25)
CALCIUM SERPL-MCNC: 9.5 MG/DL (ref 8.3–10.1)
CHLORIDE SERPL-SCNC: 102 MMOL/L (ref 100–108)
CHOLEST SERPL-MCNC: 141 MG/DL
CLARITY UR: CLEAR
CO2 SERPL-SCNC: 31 MMOL/L (ref 21–32)
COLOR UR: YELLOW
CREAT SERPL-MCNC: 1.11 MG/DL (ref 0.6–1.3)
ERYTHROCYTE [DISTWIDTH] IN BLOOD BY AUTOMATED COUNT: 11.7 % (ref 11.6–15.1)
GFR SERPL CREATININE-BSD FRML MDRD: 64 ML/MIN/1.73SQ M
GLUCOSE P FAST SERPL-MCNC: 108 MG/DL (ref 65–99)
GLUCOSE UR STRIP-MCNC: NEGATIVE MG/DL
HCT VFR BLD AUTO: 40.2 % (ref 36.5–49.3)
HDLC SERPL-MCNC: 61 MG/DL
HGB BLD-MCNC: 14.6 G/DL (ref 12–17)
HGB UR QL STRIP.AUTO: NEGATIVE
KETONES UR STRIP-MCNC: NEGATIVE MG/DL
LDLC SERPL CALC-MCNC: 66 MG/DL (ref 0–100)
LEUKOCYTE ESTERASE UR QL STRIP: NEGATIVE
MAGNESIUM SERPL-MCNC: 2.3 MG/DL (ref 1.6–2.6)
MCH RBC QN AUTO: 31.1 PG (ref 26.8–34.3)
MCHC RBC AUTO-ENTMCNC: 36.3 G/DL (ref 31.4–37.4)
MCV RBC AUTO: 86 FL (ref 82–98)
MUCOUS THREADS UR QL AUTO: ABNORMAL
NITRITE UR QL STRIP: NEGATIVE
NON-SQ EPI CELLS URNS QL MICRO: ABNORMAL /HPF
NONHDLC SERPL-MCNC: 80 MG/DL
PH UR STRIP.AUTO: 6 [PH]
PLATELET # BLD AUTO: 233 THOUSANDS/UL (ref 149–390)
PMV BLD AUTO: 9.4 FL (ref 8.9–12.7)
POTASSIUM SERPL-SCNC: 3.4 MMOL/L (ref 3.5–5.3)
PROT SERPL-MCNC: 7.3 G/DL (ref 6.4–8.2)
PROT UR STRIP-MCNC: ABNORMAL MG/DL
PSA SERPL-MCNC: 2.6 NG/ML (ref 0–4)
RBC # BLD AUTO: 4.69 MILLION/UL (ref 3.88–5.62)
RBC #/AREA URNS AUTO: ABNORMAL /HPF
SODIUM SERPL-SCNC: 138 MMOL/L (ref 136–145)
SP GR UR STRIP.AUTO: 1.02 (ref 1–1.03)
TRIGL SERPL-MCNC: 72 MG/DL
UROBILINOGEN UR STRIP-ACNC: <2 MG/DL
WBC # BLD AUTO: 6.08 THOUSAND/UL (ref 4.31–10.16)
WBC #/AREA URNS AUTO: ABNORMAL /HPF

## 2022-03-17 PROCEDURE — 83735 ASSAY OF MAGNESIUM: CPT

## 2022-03-17 PROCEDURE — 36415 COLL VENOUS BLD VENIPUNCTURE: CPT

## 2022-03-17 PROCEDURE — G0103 PSA SCREENING: HCPCS

## 2022-03-17 PROCEDURE — 85027 COMPLETE CBC AUTOMATED: CPT

## 2022-03-17 PROCEDURE — 81001 URINALYSIS AUTO W/SCOPE: CPT

## 2022-03-17 PROCEDURE — 80053 COMPREHEN METABOLIC PANEL: CPT

## 2022-03-17 PROCEDURE — 80061 LIPID PANEL: CPT

## 2022-03-18 ENCOUNTER — TELEPHONE (OUTPATIENT)
Dept: FAMILY MEDICINE CLINIC | Facility: CLINIC | Age: 77
End: 2022-03-18

## 2022-03-31 ENCOUNTER — OFFICE VISIT (OUTPATIENT)
Dept: FAMILY MEDICINE CLINIC | Facility: CLINIC | Age: 77
End: 2022-03-31
Payer: MEDICARE

## 2022-03-31 VITALS
DIASTOLIC BLOOD PRESSURE: 66 MMHG | SYSTOLIC BLOOD PRESSURE: 132 MMHG | RESPIRATION RATE: 16 BRPM | HEART RATE: 76 BPM | WEIGHT: 221 LBS | HEIGHT: 67 IN | BODY MASS INDEX: 34.69 KG/M2 | TEMPERATURE: 99.3 F

## 2022-03-31 DIAGNOSIS — M25.511 CHRONIC RIGHT SHOULDER PAIN: ICD-10-CM

## 2022-03-31 DIAGNOSIS — Z00.00 MEDICARE ANNUAL WELLNESS VISIT, SUBSEQUENT: ICD-10-CM

## 2022-03-31 DIAGNOSIS — E87.6 HYPOKALEMIA: ICD-10-CM

## 2022-03-31 DIAGNOSIS — H90.6 MIXED CONDUCTIVE AND SENSORINEURAL HEARING LOSS OF BOTH EARS: ICD-10-CM

## 2022-03-31 DIAGNOSIS — G89.29 CHRONIC RIGHT SHOULDER PAIN: ICD-10-CM

## 2022-03-31 DIAGNOSIS — E66.09 CLASS 1 OBESITY DUE TO EXCESS CALORIES WITHOUT SERIOUS COMORBIDITY WITH BODY MASS INDEX (BMI) OF 34.0 TO 34.9 IN ADULT: ICD-10-CM

## 2022-03-31 DIAGNOSIS — E78.00 HYPERCHOLESTEROLEMIA: ICD-10-CM

## 2022-03-31 DIAGNOSIS — R73.01 ELEVATED FASTING BLOOD SUGAR: ICD-10-CM

## 2022-03-31 DIAGNOSIS — I65.23 CAROTID STENOSIS, ASYMPTOMATIC, BILATERAL: ICD-10-CM

## 2022-03-31 DIAGNOSIS — M15.9 PRIMARY OSTEOARTHRITIS INVOLVING MULTIPLE JOINTS: ICD-10-CM

## 2022-03-31 DIAGNOSIS — L82.1 SEBORRHEIC KERATOSES: ICD-10-CM

## 2022-03-31 DIAGNOSIS — I10 BENIGN ESSENTIAL HYPERTENSION: Primary | ICD-10-CM

## 2022-03-31 DIAGNOSIS — H04.123 DRY EYE SYNDROME OF BOTH EYES: ICD-10-CM

## 2022-03-31 LAB — SL AMB POCT HEMOGLOBIN AIC: 5.1 (ref ?–6.5)

## 2022-03-31 PROCEDURE — 99214 OFFICE O/P EST MOD 30 MIN: CPT | Performed by: FAMILY MEDICINE

## 2022-03-31 PROCEDURE — 83036 HEMOGLOBIN GLYCOSYLATED A1C: CPT | Performed by: FAMILY MEDICINE

## 2022-03-31 PROCEDURE — G0439 PPPS, SUBSEQ VISIT: HCPCS | Performed by: FAMILY MEDICINE

## 2022-03-31 PROCEDURE — 1123F ACP DISCUSS/DSCN MKR DOCD: CPT | Performed by: FAMILY MEDICINE

## 2022-03-31 RX ORDER — POTASSIUM CHLORIDE 20 MEQ/1
20 TABLET, EXTENDED RELEASE ORAL 2 TIMES DAILY
Qty: 90 TABLET | Refills: 3 | Status: SHIPPED | OUTPATIENT
Start: 2022-03-31

## 2022-03-31 NOTE — PROGRESS NOTES
Chief Complaint   Patient presents with   [facility] Wellness Visit     Health Maintenance   Topic Date Due    Fall Risk  03/18/2022    Depression Screening  03/18/2022    Medicare Annual Wellness Visit (AWV)  03/18/2022    BMI: Followup Plan  03/18/2022    BMI: Adult  09/09/2022    DTaP,Tdap,and Td Vaccines (2 - Td or Tdap) 04/18/2027    Hepatitis C Screening  Completed    Pneumococcal Vaccine: 65+ Years  Completed    Influenza Vaccine  Completed    COVID-19 Vaccine  Completed    HIB Vaccine  Aged Out    Hepatitis B Vaccine  Aged Out    IPV Vaccine  Aged Out    Hepatitis A Vaccine  Aged Out    Meningococcal ACWY Vaccine  Aged Out    HPV Vaccine  Aged Out        Assessment and Plan:     Problem List Items Addressed This Visit        Cardiovascular and Mediastinum    Benign essential hypertension - Primary    Carotid stenosis, asymptomatic, bilateral       Nervous and Auditory    Mixed conductive and sensorineural hearing loss of both ears       Musculoskeletal and Integument    Osteoarthritis       Other    Hypercholesterolemia    Class 1 obesity due to excess calories without serious comorbidity with body mass index (BMI) of 34 0 to 34 9 in adult    Medicare annual wellness visit, subsequent    Hypokalemia    Dry eye syndrome of both eyes        BMI Counseling: Body mass index is 34 61 kg/m²  The BMI is above normal  Nutrition recommendations include decreasing portion sizes, encouraging healthy choices of fruits and vegetables, moderation in carbohydrate intake and increasing intake of lean protein  Exercise recommendations include exercising 3-5 times per week  No pharmacotherapy was ordered  Patient referred to PCP  Rationale for BMI follow-up plan is due to patient being overweight or obese  Has been decreasing portions  Began walking again    Depression Screening and Follow-up Plan: Patient was screened for depression during today's encounter   They screened negative with a PHQ-2 score of 0       Preventive health issues were discussed with patient, and age appropriate screening tests were ordered as noted in patient's After Visit Summary  Personalized health advice and appropriate referrals for health education or preventive services given if needed, as noted in patient's After Visit Summary  History of Present Illness:     Patient presents for Medicare Annual Wellness visit    He continues to live independently in a multi story home with his wife  They had done in addition between the house in the garage that allows them to live on 1 floor using the up stairs only for guests  He continues to drive without incident  He has been working on his diet and trying to make modifications  Drinks 1-2 cups of coffee a day  He drinks about 2 beers per year  He walks and hopes to do so more regularly  In regard to hobbies he used to like to fish person he did that with passed away  He and his wife have friends that they get together with about twice a month  He has 1 cat  He has a daughter who lives locally  Goes to the dentist every 6 months and recently had a filling this week and has 2 crowns that need to be done in the near future  He had an eye exam in 2020 and will schedule 1 for later this year  He uses Restasis for dry eyes      Patient Care Team:  Manav Be MD as PCP - General  Cosme Siegel DPM (Podiatry)     Problem List:     Patient Active Problem List   Diagnosis    Benign essential hypertension    Carotid stenosis, asymptomatic, bilateral    First degree AV block    Hypercholesterolemia    Class 1 obesity due to excess calories without serious comorbidity with body mass index (BMI) of 34 0 to 34 9 in adult    Seborrheic keratoses    Chronic right shoulder pain    Osteoarthritis    Medicare annual wellness visit, subsequent    Mixed conductive and sensorineural hearing loss of both ears    Hypokalemia    Dry eye syndrome of both eyes      Past Medical and Surgical History:     His immunizations are up-to-date including 2 primary doses in a booster of COVID vaccine  He is going to get the 2nd booster at some point  He has had both pneumococcal 13 and 23 and therefore meets the requirements for current guidelines  He has seasonal flu shot  His Tdap is current in 2017  A had 2 doses of Shingrix vaccine  Colonoscopy was in 2014 and is due in 10 years and therefore 2024  Current goal is to continue to refine his diet and try to get down ultimately to 200 lb  He will work at 5 lb increments with the 1st goal being 215 lb which is actually 6 lb from his current weight of 221 lb    Past Medical History:   Diagnosis Date    Allergy     last assessed - [id number]    Cataracts, bilateral 9/19/2017    Had bilateral cataract surgery    Dyspepsia     last assessed - [id number]    Hyperlipidemia     Hypertension     Onychomycosis     last assessed - [id number]    Plantar fasciitis      Past Surgical History:   Procedure Laterality Date    COLONOSCOPY  2004    complete colonoscopy    NEUROPLASTY / TRANSPOSITION MEDIAN NERVE AT CARPAL TUNNEL BILATERAL  2002    ROTATOR CUFF REPAIR Bilateral     Right - Resolved - Aug 2004; Left - Resolved - Feb 2006      Family History:     Family History   Problem Relation Age of Onset    Crohn's disease Mother       Social History:     Social History     Socioeconomic History    Marital status: /Civil Union     Spouse name: Not on file    Number of children: Not on file    Years of education: Not on file    Highest education level: Not on file   Occupational History    Not on file   Tobacco Use    Smoking status: Never Smoker    Smokeless tobacco: Never Used   Substance and Sexual Activity    Alcohol use:  Yes    Drug use: No    Sexual activity: Not on file   Other Topics Concern    Not on file   Social History Narrative    Not on file     Social Determinants of Health     Financial Resource Strain: Not on file   Food Insecurity: Not on file   Transportation Needs: Not on file   Physical Activity: Not on file   Stress: Not on file   Social Connections: Not on file   Intimate Partner Violence: Not on file   Housing Stability: Not on file      Medications and Allergies:     Current Outpatient Medications   Medication Sig Dispense Refill    amoxicillin (AMOXIL) 500 mg capsule   2    Ascorbic Acid (VITAMIN C) 100 MG tablet Take 100 mg by mouth daily      aspirin 81 MG tablet Take 1 tablet by mouth daily      chlorthalidone 25 mg tablet TAKE 1 TABLET BY MOUTH EVERY  tablet 3    cholecalciferol (VITAMIN D3) 1,000 units tablet Take 500 Units by mouth daily      clotrimazole-betamethasone (LOTRISONE) 1-0 05 % cream Apply topically 2 (two) times a day as needed (rash) 30 g 2    glucosamine 500 MG CAPS capsule Take 500 mg by mouth      losartan (COZAAR) 50 mg tablet TAKE 1 TABLET BY MOUTH EVERY DAY 90 tablet 3    Multiple Vitamins-Minerals (MULTIVITAMIN WITH MINERALS) tablet Take 1 tablet by mouth daily      Omega-3 Fatty Acids (FISH OIL) 1,000 mg Take 1,000 mg by mouth daily      RESTASIS 0 05 % ophthalmic emulsion INSTILL 1 DROP BY OPHTHALMIC ROUTE 2 TIMES EVERY DAY INTO BOTH EYES AS NEEDED  4    simvastatin (ZOCOR) 40 mg tablet TAKE 1 TABLET BY MOUTH EVERY DAY 90 tablet 3     No current facility-administered medications for this visit       No Known Allergies   Immunizations:     Immunization History   Administered Date(s) Administered    COVID-19 PFIZER VACCINE 0 3 ML IM 01/16/2021, 02/05/2021, 10/02/2021    Influenza Split High Dose Preservative Free IM 10/26/2015, 09/09/2016, 09/19/2017    Influenza, high dose seasonal 0 7 mL 08/30/2018, 09/05/2019, 09/17/2020, 10/18/2021    Influenza, seasonal, injectable 10/01/1999    Pneumococcal Conjugate 13-Valent 07/16/2015    Pneumococcal Polysaccharide PPV23 1945, 11/02/2011    Td (adult), adsorbed 05/25/2000, 07/01/2005    Tdap 04/18/2017    Zoster 1945    Zoster Vaccine Recombinant 04/30/2019, 09/12/2019      Health Maintenance:         Topic Date Due    Hepatitis C Screening  Completed     There are no preventive care reminders to display for this patient  Medicare Health Risk Assessment:     There were no vitals taken for this visit  Amanda Beyer is here for his Subsequent Wellness visit  Last Medicare Wellness visit information reviewed, patient interviewed and updates made to the record today  Health Risk Assessment:   Patient rates overall health as very good  Patient feels that their physical health rating is same  Patient is very satisfied with their life  Eyesight was rated as same  Hearing was rated as same  Patient feels that their emotional and mental health rating is same  Patients states they are never, rarely angry  Patient states they are never, rarely unusually tired/fatigued  Pain experienced in the last 7 days has been some  Patient's pain rating has been 3/10  Patient states that he has experienced no weight loss or gain in last 6 months  Depression Screening:   PHQ-2 Score: 0      Fall Risk Screening: In the past year, patient has experienced: no history of falling in past year      Home Safety:  Patient does not have trouble with stairs inside or outside of their home  Patient has working smoke alarms and has working carbon monoxide detector  Home safety hazards include: none  Nutrition:   Current diet is Regular  Medications:   Patient is currently taking over-the-counter supplements  OTC medications include: see medication list  Patient is able to manage medications  Activities of Daily Living (ADLs)/Instrumental Activities of Daily Living (IADLs):   Walk and transfer into and out of bed and chair?: Yes  Dress and groom yourself?: Yes    Bathe or shower yourself?: Yes    Feed yourself?  Yes  Do your laundry/housekeeping?: Yes  Manage your money, pay your bills and track your expenses?: Yes  Make your own meals?: Yes    Do your own shopping?: Yes    Previous Hospitalizations:   Any hospitalizations or ED visits within the last 12 months?: No      Advance Care Planning:   Living will: Yes    Durable POA for healthcare: Yes    Advanced directive: Yes    Advanced directive counseling given: Yes    End of Life Decisions reviewed with patient: Yes      Comments: Wife and then daughter Regan Anderson    Cognitive Screening:   Provider or family/friend/caregiver concerned regarding cognition?: No    PREVENTIVE SCREENINGS      Cardiovascular Screening:    General: Screening Not Indicated and History Lipid Disorder      Diabetes Screening:     General: Screening Current      Prostate Cancer Screening:    General: Screening Not Indicated      Lung Cancer Screening:     General: Screening Not Indicated      Hepatitis C Screening:    General: Screening Current    Screening, Brief Intervention, and Referral to Treatment (SBIRT)    Screening      AUDIT-C Screenin) How often did you have a drink containing alcohol in the past year? monthly or less  2) How many drinks did you have on a typical day when you were drinking in the past year?  1 to 2  3) How often did you have 6 or more drinks on one occasion in the past year? never    AUDIT-C Score: 1  Interpretation: Score 0-3 (male): Negative screen for alcohol misuse    Single Item Drug Screening:  How often have you used an illegal drug (including marijuana) or a prescription medication for non-medical reasons in the past year? never    Single Item Drug Screen Score: 0  Interpretation: Negative screen for possible drug use disorder      Joselyn Samuel MD

## 2022-03-31 NOTE — PATIENT INSTRUCTIONS

## 2022-04-01 NOTE — ASSESSMENT & PLAN NOTE
He has made some dietary changes   He is going to increase his walking to daily as the weather gets nicer    More specific plan is outlined in his BMI quality note

## 2022-04-01 NOTE — ASSESSMENT & PLAN NOTE
He has run slightly low or is low normal potassiums likely secondary to his diuretic therapy  He takes an over-the-counter potassium magnesium capsule but has not really been able to get his potassium above really borderline low or mildly decreased        I have prescribed him potassium chloride 20 mEq daily to take new     He will get a BMP in 1 month

## 2022-04-01 NOTE — ASSESSMENT & PLAN NOTE
He has no symptoms     Last vascular study was done in 2020 showing no changes since 2017 at which time there were bilateral less than 50% conclusions    Will recheck these in 2023    Blood pressure is controlled   Lipids are controlled    He takes aspirin 81 mg daily

## 2022-04-01 NOTE — ASSESSMENT & PLAN NOTE
Blood pressure is acceptable but not optimal    He is on chlorthalidone, losartan, and potassium supplement    Recent labs show creatinine 1 11 GFR 64  Electrolytes showed potassium low at 3 4 new line magnesium normal at 2 3

## 2022-04-01 NOTE — PROGRESS NOTES
Assessment/Plan:    Reviewed his recent labs and they were noted under the problem oriented charting  He also had a normal CBC and PSA stable at 2 6  His fasting blood sugar was 108 compared to prior 101  An A1c was done today and was 5 1  And discussed with him the need to continue to modify his diet and lose weight and be active  I did explain that at this point he does not have pre diabetes  We discussed 0000 hours reducing my clinical hours in anticipation of alf  He is agreeable to transition his care to Dr Suresh Travis in our office  I think him for the privilege of being his physician for all of these years and wished him well  Carotid stenosis, asymptomatic, bilateral  He has no symptoms     Last vascular study was done in 2020 showing no changes since 2017 at which time there were bilateral less than 50% conclusions  Will recheck these in 2023    Blood pressure is controlled   Lipids are controlled    He takes aspirin 81 mg daily    Benign essential hypertension  Blood pressure is acceptable but not optimal    He is on chlorthalidone, losartan, and potassium supplement    Recent labs show creatinine 1 11 GFR 64  Electrolytes showed potassium low at 3 4 new line magnesium normal at 2 3    Hypokalemia  He has run slightly low or is low normal potassiums likely secondary to his diuretic therapy  He takes an over-the-counter potassium magnesium capsule but has not really been able to get his potassium above really borderline low or mildly decreased  I have prescribed him potassium chloride 20 mEq daily to take new     He will get a BMP in 1 month    Osteoarthritis  This is not bothering him terribly at this point    He does take glucosamine    Hypercholesterolemia  Total cholesterol 141 HDL 61 LDL 66 triglycerides 72   LFTs normal    Continue current dose of simvastatin 40 mg daily new line he also takes Omega 3 fatty acids      Seborrheic keratoses  These do not appear to have changed much in appear benign   Continue to observe  Explained they may peel off  Reviewed signs and symptoms of possible malignant transformation    Dry eye syndrome of both eyes  He uses Restasis which is effective   Is due for an eye exam this year    Class 1 obesity due to excess calories without serious comorbidity with body mass index (BMI) of 34 0 to 34 9 in adult  He has made some dietary changes   He is going to increase his walking to daily as the weather gets nicer    More specific plan is outlined in his BMI quality note    Chronic right shoulder pain  Not limiting his activity or quality of life   Follows with orthopedics as needed       Diagnoses and all orders for this visit:    Benign essential hypertension  -     potassium chloride (K-DUR,KLOR-CON) 20 mEq tablet; Take 1 tablet (20 mEq total) by mouth 2 (two) times a day  -     Basic metabolic panel; Future    Hypokalemia  -     potassium chloride (K-DUR,KLOR-CON) 20 mEq tablet; Take 1 tablet (20 mEq total) by mouth 2 (two) times a day  -     Basic metabolic panel; Future    Hypercholesterolemia    Carotid stenosis, asymptomatic, bilateral    Class 1 obesity due to excess calories without serious comorbidity with body mass index (BMI) of 34 0 to 34 9 in adult    Seborrheic keratoses    Primary osteoarthritis involving multiple joints    Chronic right shoulder pain    Medicare annual wellness visit, subsequent    Dry eye syndrome of both eyes    Mixed conductive and sensorineural hearing loss of both ears    Elevated fasting blood sugar  -     POCT hemoglobin A1c          Subjective:      Patient ID: Deborha Kocher is a 68 y o  male  He is here today for follow-up on his chronic conditions  He reports he has been feeling good     His only concern was that he has to dark spots on his back that I looked at before any would like me to recheck        The following portions of the patient's history were reviewed and updated as appropriate: allergies, current medications, past family history, past medical history, past social history, past surgical history and problem list     Review of Systems   Constitutional:        Feels good   HENT: Positive for dental problem (See note in [facility])  Negative for hearing loss  Eyes:        He has dry eyes and uses Restasis    Will schedule an eye exam this year   Respiratory: Negative for apnea, cough, chest tightness, shortness of breath and stridor  Cardiovascular: Negative for palpitations and leg swelling  Gastrointestinal:        Denies any dyspepsia     Bowel movements are regular   Genitourinary:        Has no difficulty voiding nor any sexual concerns   Musculoskeletal: Positive for arthralgias  Still has some discomfort at times in his shoulders having previously had surgery for rotator cuff and arthritis   Skin:        See HPI   Neurological: Negative for dizziness, tremors, light-headedness and headaches  Psychiatric/Behavioral: Negative for confusion, decreased concentration, dysphoric mood and sleep disturbance  The patient is not nervous/anxious  Objective:      /66   Pulse 76   Temp 99 3 °F (37 4 °C) (Tympanic)   Resp 16   Ht 5' 7" (1 702 m)   Wt 100 kg (221 lb)   BMI 34 61 kg/m²          Physical Exam  Vitals and nursing note reviewed  Constitutional:       Appearance: Normal appearance  He is obese  Comments: He is wearing a mask   Neck:      Vascular: No carotid bruit  Comments: Thyroid appears normal  Cardiovascular:      Rate and Rhythm: Normal rate and regular rhythm  Pulses: Normal pulses  Heart sounds: No murmur heard  Pulmonary:      Effort: Pulmonary effort is normal       Breath sounds: Normal breath sounds  Musculoskeletal:      Right lower leg: No edema  Left lower leg: No edema  Lymphadenopathy:      Cervical: No cervical adenopathy  Skin:     Comments:  There are 2 oval seborrhea keratoses on his back 1 on the left upper back get any other on the move mid back a little bit left of center that are dark but appeared benign  Neurological:      Mental Status: He is alert and oriented to person, place, and time  Psychiatric:         Mood and Affect: Mood normal          Behavior: Behavior normal          Thought Content:  Thought content normal          Judgment: Judgment normal

## 2022-04-01 NOTE — ASSESSMENT & PLAN NOTE
Total cholesterol 141 HDL 61 LDL 66 triglycerides 72   LFTs normal    Continue current dose of simvastatin 40 mg daily new line he also takes Omega 3 fatty acids

## 2022-04-01 NOTE — ASSESSMENT & PLAN NOTE
These do not appear to have changed much in appear benign   Continue to observe  Explained they may peel off    Reviewed signs and symptoms of possible malignant transformation

## 2022-04-06 DIAGNOSIS — E78.00 HYPERCHOLESTEROLEMIA: ICD-10-CM

## 2022-04-06 DIAGNOSIS — I10 BENIGN ESSENTIAL HYPERTENSION: ICD-10-CM

## 2022-04-06 DIAGNOSIS — I10 ESSENTIAL HYPERTENSION: ICD-10-CM

## 2022-04-06 RX ORDER — SIMVASTATIN 40 MG
40 TABLET ORAL DAILY
Qty: 90 TABLET | Refills: 0 | Status: SHIPPED | OUTPATIENT
Start: 2022-04-06 | End: 2022-06-12 | Stop reason: SDUPTHER

## 2022-04-06 RX ORDER — CHLORTHALIDONE 25 MG/1
25 TABLET ORAL DAILY
Qty: 270 TABLET | Refills: 3 | Status: SHIPPED | OUTPATIENT
Start: 2022-04-06 | End: 2022-04-12

## 2022-04-06 RX ORDER — LOSARTAN POTASSIUM 50 MG/1
50 TABLET ORAL DAILY
Qty: 90 TABLET | Refills: 3 | Status: SHIPPED | OUTPATIENT
Start: 2022-04-06

## 2022-04-10 DIAGNOSIS — I10 ESSENTIAL HYPERTENSION: ICD-10-CM

## 2022-04-12 RX ORDER — CHLORTHALIDONE 25 MG/1
TABLET ORAL
Qty: 270 TABLET | Refills: 3 | Status: SHIPPED | OUTPATIENT
Start: 2022-04-12

## 2022-04-20 ENCOUNTER — APPOINTMENT (OUTPATIENT)
Dept: LAB | Facility: CLINIC | Age: 77
End: 2022-04-20
Payer: MEDICARE

## 2022-04-20 DIAGNOSIS — E87.6 HYPOKALEMIA: ICD-10-CM

## 2022-04-20 DIAGNOSIS — I10 BENIGN ESSENTIAL HYPERTENSION: ICD-10-CM

## 2022-04-20 LAB
ANION GAP SERPL CALCULATED.3IONS-SCNC: 4 MMOL/L (ref 4–13)
BUN SERPL-MCNC: 18 MG/DL (ref 5–25)
CALCIUM SERPL-MCNC: 9.6 MG/DL (ref 8.3–10.1)
CHLORIDE SERPL-SCNC: 106 MMOL/L (ref 100–108)
CO2 SERPL-SCNC: 29 MMOL/L (ref 21–32)
CREAT SERPL-MCNC: 1.18 MG/DL (ref 0.6–1.3)
GFR SERPL CREATININE-BSD FRML MDRD: 59 ML/MIN/1.73SQ M
GLUCOSE P FAST SERPL-MCNC: 104 MG/DL (ref 65–99)
POTASSIUM SERPL-SCNC: 4.3 MMOL/L (ref 3.5–5.3)
SODIUM SERPL-SCNC: 139 MMOL/L (ref 136–145)

## 2022-04-20 PROCEDURE — 36415 COLL VENOUS BLD VENIPUNCTURE: CPT

## 2022-04-20 PROCEDURE — 80048 BASIC METABOLIC PNL TOTAL CA: CPT

## 2022-06-12 DIAGNOSIS — E78.00 HYPERCHOLESTEROLEMIA: ICD-10-CM

## 2022-06-12 RX ORDER — SIMVASTATIN 40 MG
40 TABLET ORAL DAILY
Qty: 90 TABLET | Refills: 3 | Status: SHIPPED | OUTPATIENT
Start: 2022-06-12 | End: 2023-04-17

## 2022-08-12 DIAGNOSIS — E87.6 HYPOKALEMIA: ICD-10-CM

## 2022-08-12 DIAGNOSIS — I10 BENIGN ESSENTIAL HYPERTENSION: ICD-10-CM

## 2022-08-12 RX ORDER — POTASSIUM CHLORIDE 20 MEQ/1
TABLET, EXTENDED RELEASE ORAL
Qty: 180 TABLET | Refills: 3 | Status: SHIPPED | OUTPATIENT
Start: 2022-08-12

## 2022-09-23 ENCOUNTER — RA CDI HCC (OUTPATIENT)
Dept: OTHER | Facility: HOSPITAL | Age: 77
End: 2022-09-23

## 2022-09-23 NOTE — PROGRESS NOTES
[address]  coding opportunities       Chart reviewed, no opportunity found: CHART REVIEWED, NO OPPORTUNITY FOUND        Patients Insurance     Medicare Insurance: Medicare

## 2023-01-18 ENCOUNTER — OFFICE VISIT (OUTPATIENT)
Dept: FAMILY MEDICINE CLINIC | Facility: CLINIC | Age: 78
End: 2023-01-18

## 2023-01-18 VITALS
WEIGHT: 214 LBS | HEIGHT: 67 IN | DIASTOLIC BLOOD PRESSURE: 68 MMHG | TEMPERATURE: 99.5 F | RESPIRATION RATE: 16 BRPM | HEART RATE: 74 BPM | BODY MASS INDEX: 33.59 KG/M2 | SYSTOLIC BLOOD PRESSURE: 128 MMHG

## 2023-01-18 DIAGNOSIS — E78.00 HYPERCHOLESTEROLEMIA: ICD-10-CM

## 2023-01-18 DIAGNOSIS — N18.31 STAGE 3A CHRONIC KIDNEY DISEASE (HCC): ICD-10-CM

## 2023-01-18 DIAGNOSIS — H04.123 DRY EYE SYNDROME OF BOTH EYES: ICD-10-CM

## 2023-01-18 DIAGNOSIS — Z12.5 PROSTATE CANCER SCREENING: ICD-10-CM

## 2023-01-18 DIAGNOSIS — E87.6 HYPOKALEMIA: ICD-10-CM

## 2023-01-18 DIAGNOSIS — H90.6 MIXED CONDUCTIVE AND SENSORINEURAL HEARING LOSS OF BOTH EARS: ICD-10-CM

## 2023-01-18 DIAGNOSIS — I65.23 CAROTID STENOSIS, ASYMPTOMATIC, BILATERAL: ICD-10-CM

## 2023-01-18 DIAGNOSIS — I10 BENIGN ESSENTIAL HYPERTENSION: ICD-10-CM

## 2023-01-18 DIAGNOSIS — I10 BENIGN ESSENTIAL HYPERTENSION: Primary | ICD-10-CM

## 2023-01-18 RX ORDER — LOSARTAN POTASSIUM 50 MG/1
TABLET ORAL
Qty: 90 TABLET | Refills: 3 | Status: SHIPPED | OUTPATIENT
Start: 2023-01-18

## 2023-01-18 NOTE — ASSESSMENT & PLAN NOTE
Blood pressure controlled  Lipids controlled  Last vascular study was in 2020  I will order a surveillance study at his next visit  He continues on aspirin daily

## 2023-01-18 NOTE — ASSESSMENT & PLAN NOTE
Blood pressure in the office today is 128/68  He continues on chlorthalidone, losartan and potassium supplementation  Recent blood work is stable  He has lost 7 pounds since his last visit  He does not use a lot of salt in his diet    Lifestyle modifications recommended

## 2023-01-18 NOTE — PATIENT INSTRUCTIONS
Chronic Kidney Disease   AMBULATORY CARE:   Chronic kidney disease (CKD)  is the gradual and permanent loss of kidney function  Normally, the kidneys remove fluid, chemicals, and waste from your blood  These wastes are turned into urine by your kidneys  CKD may worsen over time and lead to kidney failure  Common signs and symptoms include the following:   Changes in how often you need to urinate    Swelling in your arms, legs, or feet    Shortness of breath    Fatigue or weakness    Bad or bitter taste in your mouth    Nausea, vomiting, or loss of appetite    Call your local emergency number (911 in the [address] [facility] [facility]) if:   You have a seizure  You have shortness of breath  Seek care immediately if:   You are confused and very drowsy  Call your doctor or nephrologist if:   You suddenly gain or lose more weight than your healthcare provider has told you is okay  You have itchy skin or a rash  You urinate more or less than you normally do  You have blood in your urine  You have nausea and are vomiting  You have fatigue or muscle weakness  You have hiccups that will not stop  You have questions or concerns about your condition or care  How CKD is diagnosed:  CKD has 5 stages  Your healthcare provider will use results from the following tests to find the stage of CKD you have:  Blood and urine tests  show how well your kidneys are working  They may also show the cause of your CKD  Ultrasound, CT scan, or MRI  pictures may be used to check your kidneys  You may be given contrast liquid to help your kidneys show up better in the pictures  Tell the healthcare provider if you have ever had an allergic reaction to contrast liquid  Do not enter the MRI room with anything metal  Metal can cause serious injury  Tell the healthcare provider if you have any metal in or on your body  A biopsy  is a procedure to remove a small piece of tissue from your kidney   It is done to find the cause of your CKD     Treatment  can help control signs and symptoms, and prevent a worse stage of CKD  Your care team may include specialists, such as a dietitian or a heart specialist  This depends on the stage of your CKD and if you have other health conditions to manage  Healthcare providers will work with you to create a plan based on your decisions for treatment  Your treatment plan may include any of the following:  Medicines  may be given to decrease your blood pressure and get rid of extra fluid  You may also receive medicine to manage health conditions that may occur with CKD, such as anemia, diabetes, and heart disease  Dialysis  is a treatment to remove chemicals and waste from your blood when your kidneys can no longer do this  Surgery  may be needed to create an arteriovenous fistula (AVF) in your arm or insert a catheter into your abdomen  This is done so you can receive dialysis  A kidney transplant  may be done if your CKD becomes severe  What you can do to manage CKD: Management may include making some lifestyle changes  Tell your healthcare provider if you have any concerns about being able to make changes  He or she can help you find solutions, including working with specialists  Ask for help creating a plan to break large goals into smaller steps  Your plan may include any of the following:  Manage other health conditions  Your healthcare provider will work with you to make a care plan that meets your needs  You will be checked regularly for heart disease or other conditions that can make CKD worse, such as diabetes  Your blood pressure will be closely monitored  You will also get a target blood pressure and help making a plan to reach your target  This may include taking your blood pressure at home  Maintain a healthy weight  Your weight and body mass index (BMI) will be checked regularly  BMI helps find if your weight is healthy for your height   Your healthcare provider will use other tests to check your muscle and protein levels  Extra weight can strain your kidneys  A low weight or low muscle mass can make you feel more tired  You may have trouble doing your daily activities  Ask your provider what a healthy weight is for you  He or she can help you create a plan to lose or gain weight safely, if needed  The plan may include keeping a food diary  This is a list of foods and liquids you have each day  Your provider will use the diary to help you make changes, if needed  Changes are based on your health and any other conditions you have, such as diabetes  Create an exercise plan  Regular exercise can help you manage CKD, high blood pressure, and diabetes  Exercise also helps control weight  Your provider can help you create exercise goals and a plan to reach those goals  For example, your goal may be to exercise for 30 minutes in a day  Your plan can include breaking exercise into 10 minute sessions, 3 times during the day  Create a healthy eating plan  Your provider may tell you to eat food low in potassium, phosphorus, or protein  Your provider may also recommend vitamin or mineral supplements  Do not take any supplements without talking to your provider  A dietitian can help you plan meals if needed  Ask how much liquid to drink each day and which liquids are best for you  Limit sodium (salt) as directed  You may need to limit sodium to less than 2,300 milligrams (mg) each day  Ask your dietitian or healthcare provider how much sodium you can have each day  The amount depends on your stage of kidney disease  Table salt, canned foods, soups, salted snacks, and processed meats, like deli meats and sausage, are high in sodium  Your provider or a dietitian can show you how to read food labels for sodium  Limit alcohol as directed  Alcohol can cause fluid retention and can affect your kidneys  Ask how much alcohol is safe for you   A drink of alcohol is 12 ounces of beer, 5 ounces of wine, or 1½ ounces of liquor  Do not smoke  Nicotine and other chemicals in cigarettes and cigars can cause kidney damage  Ask your provider for information if you currently smoke and need help to quit  E-cigarettes or smokeless tobacco still contain nicotine  Talk to your provider before you use these products  Ask about over-the-counter medicines  Medicines such as NSAIDs and laxatives may harm your kidneys  Some cough and cold medicines can raise your blood pressure  Always ask if a medicine is safe before you take it  Ask about vaccines you may need  CKD can increase your risk for infections such as pneumonia, influenza, and hepatitis  Vaccines lower your risk for infection  Your healthcare provider will tell you which vaccines you need and when to get them  Follow up with your doctor or nephrologist as directed: You will need to return for tests to monitor your kidney and nerve function, and your parathyroid hormone level  Your medicines may be changed, based on certain test results  Write down your questions so you remember to ask them during your visits  © Copyright [employer] 2022 Information is for End User's use only and may not be sold, redistributed or otherwise used for commercial purposes  All illustrations and images included in CareNotes® are the copyrighted property of A D A M , Inc  or [address] [place] [address]  The above information is an  only  It is not intended as medical advice for individual conditions or treatments  Talk to your doctor, nurse or pharmacist before following any medical regimen to see if it is safe and effective for you  Hypertension   WHAT YOU NEED TO KNOW:   Hypertension is high blood pressure  Your blood pressure is the force of your blood moving against the walls of your arteries  Hypertension causes your blood pressure to get so high that your heart has to work much harder than normal  This can damage your heart   The cause of hypertension may not be known  This is called essential or primary hypertension  Hypertension caused by another medical condition, such as kidney disease, is called secondary hypertension  DISCHARGE INSTRUCTIONS:   Call your local emergency number (911 in the [address] [facility]) or have someone call if:   You have chest pain  You have any of the following signs of a heart attack:      Squeezing, pressure, or pain in your chest    You may  also have any of the following:     Discomfort or pain in your back, neck, jaw, stomach, or arm    Shortness of breath    Nausea or vomiting    Lightheadedness or a sudden cold sweat    You become confused or have trouble speaking  You suddenly feel lightheaded or have trouble breathing  Return to the emergency department if:   You have a severe headache or vision loss  You have weakness in an arm or leg  Call your doctor or cardiologist if:   You feel faint, dizzy, confused, or drowsy  You have been taking your blood pressure medicine but your pressure is higher than your provider says it should be  You have questions or concerns about your condition or care  Medicines: You may  need any of the following:  Antihypertensives  may be used to help lower your blood pressure  Several kinds of medicines are available  Your healthcare provider will choose medicines based on the kind of hypertension you have  You may need more than one type of medicine  Take the medicine exactly as directed  Diuretics  help decrease extra fluid that collects in your body  This will help lower your blood pressure  You may urinate more often while you take this medicine  Cholesterol medicine  helps lower your cholesterol level  A low cholesterol level helps prevent heart disease and makes it easier to control your blood pressure  Take your medicine as directed  Contact your healthcare provider if you think your medicine is not helping or if you have side effects   Tell him or her if you are allergic to any medicine  Keep a list of the medicines, vitamins, and herbs you take  Include the amounts, and when and why you take them  Bring the list or the pill bottles to follow-up visits  Carry your medicine list with you in case of an emergency  Follow up with your doctor or cardiologist as directed: You will need to return to have your blood pressure checked and to have other lab tests done  Write down your questions so you remember to ask them during your visits  Stages of hypertension:       Normal blood pressure is 119/79 or lower   Your healthcare provider may only check your blood pressure each year if it stays at a normal level  Elevated blood pressure is 120/79 to 129/79   This is sometimes called prehypertension  Your healthcare provider may suggest lifestyle changes to help lower your blood pressure to a normal level  He or she may then check it again in 3 to 6 months  Stage 1 hypertension is 130/80  to 139/89   Your provider may recommend lifestyle changes, medication, and checks every 3 to 6 months until your blood pressure is controlled  Stage 2 hypertension is 140/90 or higher   Your provider will recommend lifestyle changes and have you take 2 kinds of hypertension medicines  You will also need to have your blood pressure checked monthly until it is controlled  Manage hypertension:   Check your blood pressure at home  Avoid smoking, caffeine, and exercise at least 30 minutes before checking your blood pressure  Sit and rest for 5 minutes before you take your blood pressure  Extend your arm and support it on a flat surface  Your arm should be at the same level as your heart  Follow the directions that came with your blood pressure monitor  Check your blood pressure 2 times, 1 minute apart, before you take your medicine in the morning  Also check your blood pressure before your evening meal  Keep a record of your readings and bring it to your follow-up visits   Ask your healthcare provider what your blood pressure should be  Manage any other health conditions you have  Health conditions such as diabetes can increase your risk for hypertension  Follow your healthcare provider's instructions and take all your medicines as directed  Ask about all medicines  Certain medicines can increase your blood pressure  Examples include oral birth control pills, decongestants, herbal supplements, and NSAIDs, such as ibuprofen  Your healthcare provider can tell you which medicines are safe for you to take  This includes prescription and over-the-counter medicines  Lifestyle changes you can make to manage hypertension:  Your healthcare provider may recommend you work with a team to manage hypertension  The team may include medical experts such as a dietitian, an exercise or physical therapist, and a behavior therapist  Your family members may be included in helping you create lifestyle changes  Limit sodium (salt) as directed  Too much sodium can affect your fluid balance  Check labels to find low-sodium or no-salt-added foods  Some low-sodium foods use potassium salts for flavor  Too much potassium can also cause health problems  Your healthcare provider will tell you how much sodium and potassium are safe for you to have in a day  He or she may recommend that you limit sodium to 2,300 mg a day  Follow the meal plan recommended by your healthcare provider  A dietitian or your provider can give you more information on low-sodium plans or the DASH (Dietary Approaches to Stop Hypertension) eating plan  The DASH plan is low in sodium, processed sugar, unhealthy fats, and total fat  It is high in potassium, calcium, and fiber  These can be found in vegetables, fruit, and whole-grain foods  Be physically active throughout the day  Physical activity, such as exercise, can help control your blood pressure and your weight   Be physically active for at least 30 minutes per day, on most days of the week  Include aerobic activity, such as walking or riding a bicycle  Also include strength training at least 2 times each week  Your healthcare providers can help you create a physical activity plan  Decrease stress  This may help lower your blood pressure  Learn ways to relax, such as deep breathing or listening to music  Limit alcohol as directed  Alcohol can increase your blood pressure  A drink of alcohol is 12 ounces of beer, 5 ounces of wine, or 1½ ounces of liquor  Do not smoke  Nicotine and other chemicals in cigarettes and cigars can increase your blood pressure and also cause lung damage  Ask your healthcare provider for information if you currently smoke and need help to quit  E-cigarettes or smokeless tobacco still contain nicotine  Talk to your healthcare provider before you use these products  © Copyright [employer] 2022 Information is for End User's use only and may not be sold, redistributed or otherwise used for commercial purposes  All illustrations and images included in CareNotes® are the copyrighted property of A D A M , Inc  or [address] [place] [address]  The above information is an  only  It is not intended as medical advice for individual conditions or treatments  Talk to your doctor, nurse or pharmacist before following any medical regimen to see if it is safe and effective for you  DASH Eating Plan   WHAT YOU NEED TO KNOW:   The DASH (Dietary Approaches to Stop Hypertension) Eating Plan is designed to help prevent or lower high blood pressure  It can also help to lower LDL (bad) cholesterol and decrease your risk for heart disease  The plan is low in sodium, sugar, unhealthy fats, and total fat  It is high in potassium, calcium, magnesium, and fiber  These nutrients are added when you eat more fruits, vegetables, and whole grains  With the DASH eating plan, you need to eat a certain number of servings from each food group  This will help you get enough of certain nutrients and limit others  The amount of servings you should eat depends on how many calories you need  Your dietitian can help you create meal plans with the right number of servings for each food group  DISCHARGE INSTRUCTIONS:   What you need to know about sodium:  Your dietitian will tell you how much sodium is safe for you to have each day  People with high blood pressure should have no more than 1,500 to 2,300 mg of sodium in a day  A teaspoon (tsp) of salt has 2,300 mg of sodium  This may seem like a difficult goal, but small changes to the foods you eat can make a big difference  Your healthcare provider or dietitian can help you create a meal plan that follows your sodium limit  Read food labels  Food labels can help you choose foods that are low in sodium  The amount of sodium is listed in milligrams (mg)  The % Daily Value (DV) column tells you how much of your daily needs are met by 1 serving of the food for each nutrient listed  Choose foods that have less than 5% of the DV of sodium  These foods are considered low in sodium  Foods that have 20% or more of the DV of sodium are considered high in sodium  Avoid foods that have more than 300 mg of sodium in each serving  Choose foods that say low-sodium, reduced-sodium, or no salt added on the food label  Limit added salt  Do not salt food at the table if you add salt when you cook  Use herbs and spices, such as onions, garlic, and salt-free seasonings to add flavor  Try lemon or lime juice or vinegar to add a tart flavor  Use hot peppers or a small amount of hot pepper sauce to add a spicy flavor   Limit foods high in added salt, such as the following:    Seasonings made with salt, such as garlic salt, celery salt, onion salt, seasoned salt, meat tenderizers, and monosodium glutamate (MSG)    Miso soup and canned or dried soup mixes    Regular soy sauce, barbecue sauce, teriyaki sauce, steak sauce, Worcestershire sauce, and most flavored vinegars    Snack foods, such as salted chips, popcorn, pretzels, pork rinds, salted crackers, and salted nuts    Frozen foods, such as dinners, entrees, vegetables with sauces, and breaded meats    Ask about salt substitutes  Ask your healthcare provider if you may use salt substitutes  Some salt substitutes have ingredients that can be harmful if you have certain health conditions  Choose foods carefully at restaurants  Meals from restaurants, especially fast food restaurants, are often high in sodium  Some restaurants have nutrition information that tells you the amount of sodium in their foods  Ask to have your food prepared with less, or no salt  What you need to know about fats:  Healthy fats include unsaturated fats and omega-3 fatty acids  Unhealthy fats include saturated fats and trans fats  Include healthy fats, such as the following:      Cooking oils, such as soybean, canola, olive, or sunflower    Fatty fish, such as salmon, tuna, mackerel, or sardines    Flaxseed oil or ground flaxseed    ½ cup of cooked beans, such as black beans, kidney beans, or brown beans    1½ ounces of low-sodium nuts, such as almonds or walnuts    Low-sugar, low-sodium peanut butter    Seeds such as domitila seeds or sunflower seeds       Limit or do not have unhealthy fats, such as the following:      Foods that contain fat from animals, such as fatty meats, whole milk, butter, and cream    Shortening, stick margarine, palm oil, and coconut oil    Full-fat or creamy salad dressing    Creamy soup    Crackers, chips, and baked goods made with margarine or shortening    Foods that are fried in unhealthy fats    Gravy and sauces, such as Hoang or cheese sauces    What you need to know about carbohydrates (carbs): All carbs break down into sugar  Complex carbs contain more fiber than simple carbs   This means complex carbs go into the bloodstream more slowly and cause less of a blood sugar spike  Try to include more complex carbs and fewer simple carbs  Include complex carbs, such as the followin slice of whole-grain bread    1 ounce of dry cereal that does not contain added sugar    ½ cup of cooked oatmeal    2 ounces of cooked whole-grain pasta    ½ cup of cooked brown rice    Limit or do not have simple carbs, such as the following:      [employer], such as doughnuts, pastries, and cookies    Mixes for cornbread and biscuits    White rice and pasta mixes, such as boxed macaroni and cheese    Instant and cold cereals that contain sugar    Jelly, jam, and ice cream that contain sugar    Condiments such as ketchup    Drinks high in sugar, such as soft drinks, lemonade, and fruit juice    What you need to know about vegetables and fruits:  Vegetables and fruits can be fresh, frozen, or canned  If possible, try to choose low-sodium canned options  Include a variety of vegetables and fruits, such as the followin medium apple, pear, or peach (about ½ cup chopped)    ½ small banana    ½ cup berries, such as blueberries, strawberries, or blackberries    1 cup of raw leafy greens, such as lettuce, spinach, kale, or nevin greens    ½ cup of frozen or canned (no added salt) vegetables, such as green beans    ½ cup of fresh, frozen, or canned fruit (canned in light syrup or fruit juice)    ½ cup of vegetable or fruit juice    Limit or do not have vegetables and fruits made in the following ways:      Frozen fruit such as cherries that have added sugar    Fruit in cream or butter sauce    Canned vegetables that are high in sodium    Sauerkraut, pickled vegetables, and other foods prepared in brine    Fried vegetables or vegetables in butter or high-fat sauces    What you need to know about protein foods:    Include lean or low-fat protein foods, such as the following:      Poultry (chicken, turkey) with no skin    Fish (especially fatty fish, such as salmon, fresh tuna, or mackerel)    Lean beef and pork (loin, round, extra lean hamburger)    Egg whites and egg substitutes    1 cup of nonfat (skim) or 1% milk    1½ ounces of fat-free or low-fat cheese    6 ounces of nonfat or low-fat yogurt    Limit or do not have high-fat protein foods, such as the following:      Smoked or cured meat, such as corned beef, sands, ham, hot dogs, and sausage    Canned beans and canned meats or spreads, such as potted meats, sardines, anchovies, and imitation seafood    Deli or lunch meats, such as bologna, ham, turkey, and roast beef    High-fat meat (T-bone steak, regular hamburger, and ribs)    Whole eggs and egg yolks    Whole milk, 2% milk, and cream    Regular cheese and processed cheese    Other guidelines to follow:   Maintain a healthy weight  Your risk for heart disease is higher if you are overweight  Your healthcare provider may suggest that you lose weight if you are overweight  You can lose weight by eating fewer calories and foods that have added sugars and fat  The DASH meal plan can help you do this  Decrease calories by eating smaller portions at each meal and fewer snacks  Ask your healthcare provider for more information about how to lose weight  Exercise regularly  Regular exercise can help you reach or maintain a healthy weight  Regular exercise can also help decrease your blood pressure and improve your cholesterol levels  Get 30 minutes or more of moderate exercise each day of the week  To lose weight, get at least 60 minutes of exercise  Talk to your healthcare provider about the best exercise program for you  Limit alcohol  Women should limit alcohol to 1 drink a day  Men should limit alcohol to 2 drinks a day  A drink of alcohol is 12 ounces of beer, 5 ounces of wine, or 1½ ounces of liquor  For more information:   [facility] and [address]  [address]  [place] [address]  Phone: [phone]  Web Address: [facility] no    © Copyright [employer] 2022 Information is for End User's use only and may not be sold, redistributed or otherwise used for commercial purposes  All illustrations and images included in CareNotes® are the copyrighted property of A D A M [employer] Inc  or Rossana Chatman  The above information is an  only  It is not intended as medical advice for individual conditions or treatments  Talk to your doctor, nurse or pharmacist before following any medical regimen to see if it is safe and effective for you  Good fat  Good fats come mainly from vegetables, nuts, seeds, and fish  They differ from saturated fats by having fewer hydrogen atoms bonded to their carbon chains  Healthy fats are liquid at room temperature, not solid  There are two broad categories of beneficial fats: monounsaturated and polyunsaturated fats  Monounsaturated fats  When you dip your bread in olive oil at an [place], you're getting mostly monounsaturated fat  Monounsaturated fats have a single carbon-to-carbon double bond  The result is that it has two fewer hydrogen atoms than a saturated fat and a bend at the double bond  This structure keeps monounsaturated fats liquid at room temperature  Good sources of monounsaturated fats are olive oil, peanut oil, canola oil, avocados, and most nuts, as well as high-oleic safflower and sunflower oils  The discovery that monounsaturated fat could be healthful came from the [place] Study during the 1960s  It revealed that people in [place] and other parts of the [address] [facility] region enjoyed a low rate of heart disease despite a high-fat diet  The main fat in their diet, though, was not the saturated animal fat common in countries with higher rates of heart disease  It was olive oil, which contains mainly monounsaturated fat   This finding produced a surge of interest in olive oil and the "Mediterranean diet," a style of eating regarded as a healthful choice today  Although there's no recommended daily intake of monounsaturated fats, the [place] [employer] recommends using them as much as possible along with polyunsaturated fats to replace saturated and trans fats  Polyunsaturated fats  When you pour liquid cooking oil into a pan, there's a good chance you're using polyunsaturated fat  Corn oil, sunflower oil, and safflower oil are common examples  Polyunsaturated fats are essential fats  That means they're required for normal body functions but your body can't make them  So you must get them from food  Polyunsaturated fats are used to build cell membranes and the covering of nerves  They are needed for blood clotting, muscle movement, and inflammation  A polyunsaturated fat has two or more double bonds in its carbon chain  There are two main types of polyunsaturated fats: omega-3 fatty acids and omega-6 fatty acids  The numbers refer to the distance between the beginning of the carbon chain and the first double bond  Both types offer health benefits  Eating polyunsaturated fats in place of saturated fats or highly refined carbohydrates reduces harmful LDL cholesterol and improves the cholesterol profile  It also lowers triglycerides  Good sources of omega-3 fatty acids include fatty fish such as salmon, mackerel, and sardines, flaxseeds, walnuts, canola oil, and unhydrogenated soybean oil  Omega-3 fatty acids may help prevent and even treat heart disease and stroke  In addition to reducing blood pressure, raising HDL, and lowering triglycerides, polyunsaturated fats may help prevent lethal heart rhythms from arising  Evidence also suggests they may help reduce the need for corticosteroid medications in people with rheumatoid arthritis   Studies linking omega-3s to a wide range of other health improvements, including reducing risk of dementia, are inconclusive, and some of them have major flaws, according to a systematic review of the evidence by the Agency for Healthcare Research and Quality  Omega-6 fatty acids have also been linked to protection against heart disease  Foods rich in linoleic acid and other omega-6 fatty acids include vegetable oils such as safflower, soybean, sunflower, walnut, and corn oils  Cholesterol and Your Health   AMBULATORY CARE:   Cholesterol  is a waxy, fat-like substance  Your body uses cholesterol to make hormones and new cells, and to protect nerves  Cholesterol is made by your body  It also comes from certain foods you eat, such as meat and dairy products  Your healthcare provider can help you set goals for your cholesterol levels  He or she can help you create a plan to meet your goals  Cholesterol level goals: Your cholesterol level goals depend on your risk for heart disease, your age, and your other health conditions  The following are general guidelines: Total cholesterol  includes low-density lipoprotein (LDL), high-density lipoprotein (HDL), and triglyceride levels  The total cholesterol level should be lower than 200 mg/dL and is best at about 150 mg/dL  LDL cholesterol  is called bad cholesterol  because it forms plaque in your arteries  As plaque builds up, your arteries become narrow, and less blood flows through  When plaque decreases blood flow to your heart, you may have chest pain  If plaque completely blocks an artery that brings blood to your heart, you may have a heart attack  Plaque can break off and form blood clots  Blood clots may block arteries in your brain and cause a stroke  The level should be less than 130 mg/dL and is best at about 100 mg/dL  HDL cholesterol  is called good cholesterol  because it helps remove LDL cholesterol from your arteries  It does this by attaching to LDL cholesterol and carrying it to your liver  Your liver breaks down LDL cholesterol so your body can get rid of it   High levels of HDL cholesterol can help prevent a heart attack and stroke  Low levels of HDL cholesterol can increase your risk for heart disease, heart attack, and stroke  The level should be 60 mg/dL or higher  Triglycerides  are a type of fat that store energy from foods you eat  High levels of triglycerides also cause plaque buildup  This can increase your risk for a heart attack or stroke  If your triglyceride level is high, your LDL cholesterol level may also be high  The level should be less than 150 mg/dL  Any of the following can increase your risk for high cholesterol:   Smoking cigarettes    Being overweight or obese, or not getting enough exercise    Drinking large amounts of alcohol    A medical condition such as hypertension (high blood pressure) or diabetes    Certain genes passed from your parents to you    Age older than 65 years    What you need to know about having your cholesterol levels checked: Adults 21to 39years of age should have their cholesterol levels checked every 4 to 6 years  Adults 45 years or older should have their cholesterol checked every 1 to 2 years  You may need your cholesterol checked more often, or at a younger age, if you have risk factors for heart disease  You may also need to have your cholesterol checked more often if you have other health conditions, such as diabetes  Blood tests are used to check cholesterol levels  Blood tests measure your levels of triglycerides, LDL cholesterol, and HDL cholesterol  How healthy fats affect your cholesterol levels:  Healthy fats, also called unsaturated fats, help lower LDL cholesterol and triglyceride levels  Healthy fats include the following:  Monounsaturated fats  are found in foods such as olive oil, canola oil, avocado, nuts, and olives  Polyunsaturated fats,  such as omega 3 fats, are found in fish, such as salmon, trout, and tuna  They can also be found in plant foods such as flaxseed, walnuts, and soybeans      How unhealthy fats affect your cholesterol levels: Unhealthy fats increase LDL cholesterol and triglyceride levels  They are found in foods high in cholesterol, saturated fat, and trans fat:  Cholesterol  is found in eggs, dairy, and meat  Saturated fat  is found in butter, cheese, ice cream, whole milk, and coconut oil  Saturated fat is also found in meat, such as sausage, hot dogs, and bologna  Trans fat  is found in liquid oils and is used in fried and baked foods  Foods that contain trans fats include chips, crackers, muffins, sweet rolls, microwave popcorn, and cookies  Treatment  for high cholesterol will also decrease your risk of heart disease, heart attack, and stroke  Treatment may include any of the following:  Lifestyle changes  may include food, exercise, weight loss, and quitting smoking  You may also need to decrease the amount of alcohol you drink  Your healthcare provider will want you to start with lifestyle changes  Other treatment may be added if lifestyle changes are not enough  Your healthcare provider may recommend you work with a team to manage hyperlipidemia  The team may include medical experts such as a dietitian, an exercise or physical therapist, and a behavior therapist  Your family members may be included in helping you create lifestyle changes  Medicines  may be given to lower your LDL cholesterol, triglyceride levels, or total cholesterol level  You may need medicines to lower your cholesterol if any of the following is true:    You have a history of stroke, TIA, unstable angina, or a heart attack  Your LDL cholesterol level is 190 mg/dL or higher  You are age 36 to 76 years, have diabetes or heart disease risk factors, and your LDL cholesterol is 70 mg/dL or higher  Supplements  include fish oil, red yeast rice, and garlic  Fish oil may help lower your triglyceride and LDL cholesterol levels  It may also increase your HDL cholesterol level   Red yeast rice may help decrease your total cholesterol level and LDL cholesterol level  Garlic may help lower your total cholesterol level  Do not take any supplements without talking to your healthcare provider  Food changes you can make to lower your cholesterol levels:  A dietitian can help you create a healthy eating plan  He or she can show you how to read food labels and choose foods low in saturated fat, trans fats, and cholesterol  Decrease the total amount of fat you eat  Choose lean meats, fat-free or 1% fat milk, and low-fat dairy products, such as yogurt and cheese  Try to limit or avoid red meats  Limit or do not eat fried foods or baked goods, such as cookies  Replace unhealthy fats with healthy fats  Cook foods in olive oil or canola oil  Choose soft margarines that are low in saturated fat and trans fat  Seeds, nuts, and avocados are other examples of healthy fats  Eat foods with omega-3 fats  Examples include salmon, tuna, mackerel, walnuts, and flaxseed  Eat fish 2 times per week  Pregnant women should not eat fish that have high levels of mercury, such as shark, swordfish, and sheila mackerel  Increase the amount of high-fiber foods you eat  High-fiber foods can help lower your LDL cholesterol  Aim to get between 20 and 30 grams of fiber each day  Fruits and vegetables are high in fiber  Eat at least 5 servings each day  Other high-fiber foods are whole-grain or whole-wheat breads, pastas, or cereals, and brown rice  Eat 3 ounces of whole-grain foods each day  Increase fiber slowly  You may have abdominal discomfort, bloating, and gas if you add fiber to your diet too quickly  Eat healthy protein foods  Examples include low-fat dairy products, skinless chicken and turkey, fish, and nuts  Limit foods and drinks that are high in sugar  Your dietitian or healthcare provider can help you create daily limits for high-sugar foods and drinks  The limit may be lower if you have diabetes or another health condition   Limits can also help you lose weight if needed  Lifestyle changes you can make to lower your cholesterol levels:   Maintain a healthy weight  Ask your healthcare provider what a healthy weight is for you  Ask him or her to help you create a weight loss plan if needed  Weight loss can decrease your total cholesterol and triglyceride levels  Weight loss may also help keep your blood pressure at a healthy level  Be physically active throughout the day  Physical activity, such as exercise, can help lower your total cholesterol level and maintain a healthy weight  Physical activity can also help increase your HDL cholesterol level  Work with your healthcare provider to create an program that is right for you  Get at least 30 to 40 minutes of moderate physical activity most days of the week  Examples of exercise include brisk walking, swimming, or biking  Also include strength training at least 2 times each week  Your healthcare providers can help you create a physical activity plan  Do not smoke  Nicotine and other chemicals in cigarettes and cigars can raise your cholesterol levels  Ask your healthcare provider for information if you currently smoke and need help to quit  E-cigarettes or smokeless tobacco still contain nicotine  Talk to your healthcare provider before you use these products  Limit or do not drink alcohol  Alcohol can increase your triglyceride levels  Ask your healthcare provider before you drink alcohol  Ask how much is okay for you to drink in 24 hours or 1 week  Follow up with your doctor as directed:  Write down your questions so you remember to ask them during your visits  © Copyright [employer] 2022 Information is for End User's use only and may not be sold, redistributed or otherwise used for commercial purposes   All illustrations and images included in CareNotes® are the copyrighted property of A D A M [employer] Inc  or Rossana Maria   The above information is an  only  It is not intended as medical advice for individual conditions or treatments  Talk to your doctor, nurse or pharmacist before following any medical regimen to see if it is safe and effective for you

## 2023-01-18 NOTE — PROGRESS NOTES
[facility] - [facility]    NAME: Rainer Jensen  AGE: 68 y o  SEX: male  : 1945     DATE: 2023     Assessment and Plan:     Problem List Items Addressed This Visit        Cardiovascular and Mediastinum    Benign essential hypertension - Primary     Blood pressure in the office today is 128/68  He continues on chlorthalidone, losartan and potassium supplementation  Recent blood work is stable  He has lost 7 pounds since his last visit  He does not use a lot of salt in his diet  Lifestyle modifications recommended         Relevant Orders    CBC and differential    Basic metabolic panel    Carotid stenosis, asymptomatic, bilateral     Blood pressure controlled  Lipids controlled  Last vascular study was in   I will order a surveillance study at his next visit  He continues on aspirin daily  Nervous and Auditory    Mixed conductive and sensorineural hearing loss of both ears     No change in hearing            Genitourinary    Stage 3a chronic kidney disease ([facility] [address] )     Lab Results   Component Value Date    EGFR 59 2022    EGFR 64 2022    EGFR 64 04/15/2021    CREATININE 1 18 2022    CREATININE 1 11 2022    CREATININE 1 12 04/15/2021   Stable  Nephrotoxins discussed            Other    Hypercholesterolemia     Continue on zocor  Lipid panel ordered         Relevant Orders    Lipid Panel with Direct LDL reflex    BMI 33 0-33 9,adult     The patient has lost seven pounds since last visit  He continues to monitor his diet and exercise  Hypokalemia    Relevant Orders    Basic metabolic panel    Dry eye syndrome of both eyes   Other Visit Diagnoses     Prostate cancer screening        Relevant Orders    PSA, Total Screen          BMI Counseling: Body mass index is 33 52 kg/m²   The BMI is above normal  Nutrition recommendations include decreasing portion sizes, encouraging healthy choices of fruits and vegetables, limiting drinks that contain sugar, moderation in carbohydrate intake, increasing intake of lean protein, reducing intake of saturated and trans fat and reducing intake of cholesterol  Exercise recommendations include moderate physical activity 150 minutes/week and exercising 3-5 times per week  Rationale for BMI follow-up plan is due to patient being overweight or obese  Depression Screening and Follow-up Plan: Patient was screened for depression during today's encounter  They screened negative with a PHQ-2 score of 0  Return in about 3 months (around 4/18/2023) for Medicare wellness visit, Parmjit Mccord  Chief Complaint:     Chief Complaint   Patient presents with   • Follow-up     6 months        History of Present Illness: The patient presents to the office today for follow up  No new concerns  Blood pressure well controlled  Seven pound weight loss since last visit  Blood work due and ordered  The patient will return in April for medicare wellness visit    Review of Systems:     Review of Systems   Constitutional: Negative  Negative for fatigue  HENT: Positive for hearing loss  Negative for congestion, postnasal drip, rhinorrhea and trouble swallowing  Eyes: Negative  Negative for visual disturbance  Respiratory: Negative  Negative for choking and shortness of breath  Cardiovascular: Negative  Negative for chest pain  Gastrointestinal: Negative  Endocrine: Negative  Genitourinary: Negative  Musculoskeletal: Negative  Negative for arthralgias, back pain, myalgias and neck pain  Skin: Negative  Neurological: Negative for dizziness and headaches  Psychiatric/Behavioral: Negative           Problem List:     Patient Active Problem List   Diagnosis   • Benign essential hypertension   • Carotid stenosis, asymptomatic, bilateral   • First degree AV block   • Hypercholesterolemia   • BMI 33 0-33 9,adult   • Seborrheic keratoses   • Chronic right shoulder pain   • Osteoarthritis   • Mixed conductive and sensorineural hearing loss of both ears   • Hypokalemia   • Dry eye syndrome of both eyes   • Stage 3a chronic kidney disease (HCC)        Objective:     /68   Pulse 74   Temp 99 5 °F (37 5 °C) (Tympanic)   Resp 16   Ht 5' 7" (1 702 m)   Wt 97 1 kg (214 lb)   BMI 33 52 kg/m²     Current Outpatient Medications   Medication Sig Dispense Refill   • Ascorbic Acid (VITAMIN C) 100 MG tablet Take 100 mg by mouth daily     • aspirin 81 MG tablet Take 1 tablet by mouth daily     • chlorthalidone 25 mg tablet TAKE 1 TABLET BY MOUTH EVERY  tablet 3   • cholecalciferol (VITAMIN D3) 1,000 units tablet Take 500 Units by mouth daily     • clotrimazole-betamethasone (LOTRISONE) 1-0 05 % cream Apply topically 2 (two) times a day as needed (rash) 30 g 2   • glucosamine 500 MG CAPS capsule Take 500 mg by mouth     • losartan (COZAAR) 50 mg tablet Take 1 tablet (50 mg total) by mouth daily 90 tablet 3   • Multiple Vitamins-Minerals (MULTIVITAMIN WITH MINERALS) tablet Take 1 tablet by mouth daily     • Omega-3 Fatty Acids (FISH OIL) 1,000 mg Take 1,000 mg by mouth daily     • potassium chloride (K-DUR,KLOR-CON) 20 mEq tablet TAKE 1 TABLET BY MOUTH  TWICE DAILY (Patient taking differently: 20 mEq daily) 180 tablet 3   • RESTASIS 0 05 % ophthalmic emulsion INSTILL 1 DROP BY OPHTHALMIC ROUTE 2 TIMES EVERY DAY INTO BOTH EYES AS NEEDED  4   • simvastatin (ZOCOR) 40 mg tablet Take 1 tablet (40 mg total) by mouth daily 90 tablet 3     No current facility-administered medications for this visit  Physical Exam  Vitals reviewed  Constitutional:       Appearance: Normal appearance  HENT:      Head: Normocephalic and atraumatic  Nose: Nose normal       Mouth/Throat:      Mouth: Mucous membranes are moist    Eyes:      Extraocular Movements: Extraocular movements intact  Pupils: Pupils are equal, round, and reactive to light     Cardiovascular:      Rate and Rhythm: Normal rate and regular rhythm  Pulses: Normal pulses  Heart sounds: Normal heart sounds  Pulmonary:      Effort: Pulmonary effort is normal       Breath sounds: Normal breath sounds  Musculoskeletal:         General: Normal range of motion  Skin:     General: Skin is warm  Neurological:      General: No focal deficit present  Mental Status: He is alert and oriented to person, place, and time  Psychiatric:         Mood and Affect: Mood normal          Behavior: Behavior normal          Thought Content:  Thought content normal          Judgment: Judgment normal          Warren Damon, [id number]

## 2023-04-07 ENCOUNTER — APPOINTMENT (OUTPATIENT)
Dept: LAB | Facility: CLINIC | Age: 78
End: 2023-04-07

## 2023-04-07 DIAGNOSIS — Z12.5 PROSTATE CANCER SCREENING: ICD-10-CM

## 2023-04-07 DIAGNOSIS — E87.6 HYPOKALEMIA: ICD-10-CM

## 2023-04-07 DIAGNOSIS — I10 BENIGN ESSENTIAL HYPERTENSION: ICD-10-CM

## 2023-04-07 DIAGNOSIS — E78.00 HYPERCHOLESTEROLEMIA: ICD-10-CM

## 2023-04-07 LAB
ANION GAP SERPL CALCULATED.3IONS-SCNC: 4 MMOL/L (ref 4–13)
BASOPHILS # BLD AUTO: 0.05 THOUSANDS/ÂΜL (ref 0–0.1)
BASOPHILS NFR BLD AUTO: 1 % (ref 0–1)
BUN SERPL-MCNC: 16 MG/DL (ref 5–25)
CALCIUM SERPL-MCNC: 9.9 MG/DL (ref 8.3–10.1)
CHLORIDE SERPL-SCNC: 105 MMOL/L (ref 96–108)
CHOLEST SERPL-MCNC: 132 MG/DL
CO2 SERPL-SCNC: 29 MMOL/L (ref 21–32)
CREAT SERPL-MCNC: 1.02 MG/DL (ref 0.6–1.3)
EOSINOPHIL # BLD AUTO: 0.35 THOUSAND/ÂΜL (ref 0–0.61)
EOSINOPHIL NFR BLD AUTO: 6 % (ref 0–6)
ERYTHROCYTE [DISTWIDTH] IN BLOOD BY AUTOMATED COUNT: 11.8 % (ref 11.6–15.1)
GFR SERPL CREATININE-BSD FRML MDRD: 70 ML/MIN/1.73SQ M
GLUCOSE P FAST SERPL-MCNC: 95 MG/DL (ref 65–99)
HCT VFR BLD AUTO: 42.3 % (ref 36.5–49.3)
HDLC SERPL-MCNC: 61 MG/DL
HGB BLD-MCNC: 14.3 G/DL (ref 12–17)
IMM GRANULOCYTES # BLD AUTO: 0.02 THOUSAND/UL (ref 0–0.2)
IMM GRANULOCYTES NFR BLD AUTO: 0 % (ref 0–2)
LDLC SERPL CALC-MCNC: 54 MG/DL (ref 0–100)
LYMPHOCYTES # BLD AUTO: 1.23 THOUSANDS/ÂΜL (ref 0.6–4.47)
LYMPHOCYTES NFR BLD AUTO: 20 % (ref 14–44)
MCH RBC QN AUTO: 31.2 PG (ref 26.8–34.3)
MCHC RBC AUTO-ENTMCNC: 33.8 G/DL (ref 31.4–37.4)
MCV RBC AUTO: 92 FL (ref 82–98)
MONOCYTES # BLD AUTO: 0.5 THOUSAND/ÂΜL (ref 0.17–1.22)
MONOCYTES NFR BLD AUTO: 8 % (ref 4–12)
NEUTROPHILS # BLD AUTO: 3.97 THOUSANDS/ÂΜL (ref 1.85–7.62)
NEUTS SEG NFR BLD AUTO: 65 % (ref 43–75)
NRBC BLD AUTO-RTO: 0 /100 WBCS
PLATELET # BLD AUTO: 261 THOUSANDS/UL (ref 149–390)
PMV BLD AUTO: 9.8 FL (ref 8.9–12.7)
POTASSIUM SERPL-SCNC: 4.3 MMOL/L (ref 3.5–5.3)
PSA SERPL-MCNC: 2.9 NG/ML (ref 0–4)
RBC # BLD AUTO: 4.59 MILLION/UL (ref 3.88–5.62)
SODIUM SERPL-SCNC: 138 MMOL/L (ref 135–147)
TRIGL SERPL-MCNC: 86 MG/DL
WBC # BLD AUTO: 6.12 THOUSAND/UL (ref 4.31–10.16)

## 2023-04-08 DIAGNOSIS — I10 ESSENTIAL HYPERTENSION: ICD-10-CM

## 2023-04-08 RX ORDER — CHLORTHALIDONE 25 MG/1
TABLET ORAL
Qty: 90 TABLET | Refills: 3 | Status: SHIPPED | OUTPATIENT
Start: 2023-04-08

## 2023-09-05 ENCOUNTER — HOSPITAL ENCOUNTER (OUTPATIENT)
Dept: NON INVASIVE DIAGNOSTICS | Facility: CLINIC | Age: 78
Discharge: HOME | End: 2023-09-05
Payer: MEDICARE

## 2023-09-05 DIAGNOSIS — I65.23 CAROTID STENOSIS, ASYMPTOMATIC, BILATERAL: ICD-10-CM

## 2023-09-05 PROCEDURE — 93880 EXTRACRANIAL BILAT STUDY: CPT

## 2023-09-05 PROCEDURE — 93880 EXTRACRANIAL BILAT STUDY: CPT | Performed by: SURGERY

## 2023-09-15 ENCOUNTER — TELEPHONE (OUTPATIENT)
Dept: FAMILY MEDICINE CLINIC | Facility: CLINIC | Age: 78
End: 2023-09-15

## 2023-09-15 DIAGNOSIS — I77.1 SUBCLAVIAN ARTERY STENOSIS, LEFT (HCC): Primary | ICD-10-CM

## 2023-09-15 DIAGNOSIS — I65.23 CAROTID STENOSIS, ASYMPTOMATIC, BILATERAL: ICD-10-CM

## 2023-09-15 NOTE — TELEPHONE ENCOUNTER
Patient returned call. Reviewed carotid ultrasound with patient. New left subclavian stenosis. Will refer to vascular.

## 2023-10-17 ENCOUNTER — OFFICE VISIT (OUTPATIENT)
Dept: FAMILY MEDICINE CLINIC | Facility: CLINIC | Age: 78
End: 2023-10-17
Payer: MEDICARE

## 2023-10-17 VITALS
HEART RATE: 50 BPM | OXYGEN SATURATION: 97 % | HEIGHT: 67 IN | DIASTOLIC BLOOD PRESSURE: 88 MMHG | SYSTOLIC BLOOD PRESSURE: 130 MMHG | BODY MASS INDEX: 33.84 KG/M2 | RESPIRATION RATE: 16 BRPM | WEIGHT: 215.6 LBS | TEMPERATURE: 97.3 F

## 2023-10-17 DIAGNOSIS — I44.1 AV BLOCK, MOBITZ 1: ICD-10-CM

## 2023-10-17 DIAGNOSIS — I44.0 FIRST DEGREE AV BLOCK: ICD-10-CM

## 2023-10-17 DIAGNOSIS — Z12.5 PROSTATE CANCER SCREENING: ICD-10-CM

## 2023-10-17 DIAGNOSIS — N18.31 STAGE 3A CHRONIC KIDNEY DISEASE (HCC): ICD-10-CM

## 2023-10-17 DIAGNOSIS — E87.6 HYPOKALEMIA: ICD-10-CM

## 2023-10-17 DIAGNOSIS — I10 BENIGN ESSENTIAL HYPERTENSION: Primary | ICD-10-CM

## 2023-10-17 DIAGNOSIS — E78.00 HYPERCHOLESTEROLEMIA: ICD-10-CM

## 2023-10-17 DIAGNOSIS — I65.23 CAROTID STENOSIS, ASYMPTOMATIC, BILATERAL: ICD-10-CM

## 2023-10-17 DIAGNOSIS — R00.1 BRADYCARDIA: ICD-10-CM

## 2023-10-17 PROCEDURE — 99214 OFFICE O/P EST MOD 30 MIN: CPT | Performed by: NURSE PRACTITIONER

## 2023-10-17 PROCEDURE — 93000 ELECTROCARDIOGRAM COMPLETE: CPT | Performed by: NURSE PRACTITIONER

## 2023-10-17 NOTE — PROGRESS NOTES
[facility]    NAME: Ivonne Weaver  AGE: 68 y.o. SEX: male  : 1945     DATE: 10/17/2023     Assessment and Plan:     Problem List Items Addressed This Visit        Cardiovascular and Mediastinum    Benign essential hypertension - Primary (Chronic)     Blood pressure in the office today is 130/88. The patient remains on his losartan and chlorthalidone. Information regarding the DASH diet provided to the patient. Relevant Orders    Comprehensive metabolic panel    CBC and differential    Carotid stenosis, asymptomatic, bilateral (Chronic)     The patient recently had carotid duplex studies performed for surveillance. He currently now has greater than 50% stenosis on the left. He has an upcoming appointment with vascular. 2023    Impression     RIGHT:  There is <50% stenosis noted in the internal carotid artery. Plaque is  heterogenous and irregular. Vertebral artery flow is antegrade. There is no significant subclavian artery  disease. LEFT:  There is <50% stenosis noted in the internal carotid artery. Plaque is  heterogenous and irregular. Vertebral artery flow is antegrade. There is >50% stenosis in the subclavian  artery. In comparison to the study of 2022, the >50% stenosis in the left  subclavian is a new finding. First degree AV block (Chronic)     EKG performed in the office today due to bradycardia. Patient does have a Mobitz 1 second-degree AV block noted. He has never seen cardiology. I will refer him to cardiology at this time. Genitourinary    Stage 3a chronic kidney disease ([address]) (Chronic)     Lab Results   Component Value Date    EGFR 70 2023    EGFR 59 2022    EGFR 64 2022    CREATININE 1.02 2023    CREATININE 1.18 2022    CREATININE 1.11 2022   Patient aware of nephrotoxins. Low-salt diet.          Relevant Orders    Comprehensive metabolic panel Other    Hypercholesterolemia     The patient continues on his simvastatin. Low-cholesterol diet and weight loss recommended. Component      Latest Ref Rng 4/7/2023   Cholesterol      See Comment mg/dL 132    Triglycerides      See Comment mg/dL 86    HDL      >=40 mg/dL 61    LDL Calculated      0 - 100 mg/dL 54        The 10-year ASCVD risk score (Hayder TAYLOR, et al., 2019) is: 28%    Values used to calculate the score:      Age: 68 years      Sex: Male      Is Non- : No      Diabetic: No      Tobacco smoker: No      Systolic Blood Pressure: 769 mmHg      Is BP treated: Yes      HDL Cholesterol: 61 mg/dL      Total Cholesterol: 132 mg/dL           Relevant Orders    Lipid Panel with Direct LDL reflex    BMI 33.0-33.9,adult     Discussed lifestyle changes including diet and exercise. Diet should include switching from simple carbohydrates like white rice, white pasta, white bread to brown rice, wheat pasta, wheat bread. Increase exercise to 150 minutes per week, should include cardio and weightlifting. Hypokalemia     Continues on potassium supplementation. Lab Results   Component Value Date    SODIUM 138 04/07/2023    K 4.3 04/07/2023     04/07/2023    CO2 29 04/07/2023    BUN 16 04/07/2023    CREATININE 1.02 04/07/2023    GLUC 83 12/12/2016    CALCIUM 9.9 04/07/2023              Relevant Orders    Comprehensive metabolic panel   Other Visit Diagnoses     Prostate cancer screening        Relevant Orders    PSA, Total Screen    Bradycardia        Relevant Orders    POCT ECG (Completed)    AV block, Mobitz 1        Relevant Orders    Ambulatory Referral to Cardiology            Depression Screening and Follow-up Plan: Patient was screened for depression during today's encounter. They screened negative with a PHQ-2 score of 0. Return in about 6 months (around 4/17/2024) for Patricia Arias, Office Visit, Medicare wellness visit.      Chief Complaint:     Chief Complaint   Patient presents with   • Follow-up     6 month        History of Present Illness:   Alysha Ramirez presents to the office today for follow-up. Overall he feels well. He did receive his flu vaccine, COVID-vaccine, and RSV vaccine recently at a local pharmacy. He will be due for surveillance blood work in 6 months. The patient was bradycardic on exam and EKG did show second-degree AV block Mobitz type I. He has never seen cardiology. I will refer him to cardiology for further evaluation and management. I will see him back in 6 months for his annual wellness visit. Review of Systems:     Review of Systems   Constitutional: Negative. Negative for fatigue. HENT: Negative. Negative for congestion, postnasal drip, rhinorrhea and trouble swallowing. Eyes: Negative. Negative for visual disturbance. Respiratory: Negative. Negative for choking and shortness of breath. Cardiovascular: Negative. Negative for chest pain. Gastrointestinal: Negative. Endocrine: Negative. Genitourinary: Negative. Musculoskeletal: Negative. Negative for arthralgias, back pain, myalgias and neck pain. Skin: Negative. Neurological:  Negative for dizziness and headaches. Psychiatric/Behavioral: Negative.           Problem List:     Patient Active Problem List   Diagnosis   • Benign essential hypertension   • Carotid stenosis, asymptomatic, bilateral   • First degree AV block   • Hypercholesterolemia   • BMI 33.0-33.9,adult   • Seborrheic keratoses   • Chronic right shoulder pain   • Osteoarthritis   • Mixed conductive and sensorineural hearing loss of both ears   • Hypokalemia   • Dry eye syndrome of both eyes   • Stage 3a chronic kidney disease (HCC)        Objective:     /88   Pulse (!) 50   Temp (!) 97.3 °F (36.3 °C) (Tympanic)   Resp 16   Ht 5' 7" (1.702 m)   Wt 97.8 kg (215 lb 9.6 oz)   SpO2 97%   BMI 33.77 kg/m²     Current Outpatient Medications   Medication Sig Dispense Refill   • Ascorbic Acid (VITAMIN C) 100 MG tablet Take 100 mg by mouth daily     • aspirin 81 MG tablet Take 1 tablet by mouth daily     • chlorthalidone 25 mg tablet TAKE 1 TABLET BY MOUTH  DAILY 90 tablet 3   • cholecalciferol (VITAMIN D3) 1,000 units tablet Take 500 Units by mouth daily     • clotrimazole-betamethasone (LOTRISONE) 1-0.05 % cream Apply topically 2 (two) times a day as needed (rash) 30 g 2   • glucosamine 500 MG CAPS capsule Take 500 mg by mouth     • losartan (COZAAR) 50 mg tablet TAKE 1 TABLET BY MOUTH  DAILY 90 tablet 3   • Multiple Vitamins-Minerals (MULTIVITAMIN WITH MINERALS) tablet Take 1 tablet by mouth daily     • Omega-3 Fatty Acids (FISH OIL) 1,000 mg Take 1,000 mg by mouth daily     • potassium chloride (K-DUR,KLOR-CON) 20 mEq tablet TAKE 1 TABLET BY MOUTH  TWICE DAILY (Patient taking differently: 20 mEq daily) 180 tablet 3   • RESTASIS 0.05 % ophthalmic emulsion INSTILL 1 DROP BY OPHTHALMIC ROUTE 2 TIMES EVERY DAY INTO BOTH EYES AS NEEDED  4   • simvastatin (ZOCOR) 40 mg tablet TAKE 1 TABLET BY MOUTH  DAILY 90 tablet 3     No current facility-administered medications for this visit. Physical Exam  Vitals and nursing note reviewed. Constitutional:       General: He is not in acute distress. Appearance: Normal appearance. He is well-developed. He is obese. HENT:      Head: Normocephalic and atraumatic. Nose: Nose normal.      Mouth/Throat:      Mouth: Mucous membranes are moist.   Eyes:      Conjunctiva/sclera: Conjunctivae normal.   Cardiovascular:      Rate and Rhythm: Regular rhythm. Bradycardia present. Pulses: Normal pulses. Heart sounds: Normal heart sounds. No murmur heard. Pulmonary:      Effort: Pulmonary effort is normal. No respiratory distress. Breath sounds: Normal breath sounds. Abdominal:      Palpations: Abdomen is soft. Tenderness: There is no abdominal tenderness. Musculoskeletal:         General: No swelling. Normal range of motion. Cervical back: Neck supple. Skin:     General: Skin is warm and dry. Capillary Refill: Capillary refill takes less than 2 seconds. Neurological:      General: No focal deficit present. Mental Status: He is alert and oriented to person, place, and time. Mental status is at baseline. Psychiatric:         Mood and Affect: Mood normal.         Behavior: Behavior normal.         Thought Content:  Thought content normal.         Judgment: Judgment normal.         Eboni Mackenzie, [address]

## 2023-10-17 NOTE — ASSESSMENT & PLAN NOTE
The patient continues on his simvastatin. Low-cholesterol diet and weight loss recommended.     Component      Latest Ref Rng 4/7/2023   Cholesterol      See Comment mg/dL 132    Triglycerides      See Comment mg/dL 86    HDL      >=40 mg/dL 61    LDL Calculated      0 - 100 mg/dL 54        The 10-year ASCVD risk score (Hayder TAYLOR, et al., 2019) is: 28%    Values used to calculate the score:      Age: 68 years      Sex: Male      Is Non- : No      Diabetic: No      Tobacco smoker: No      Systolic Blood Pressure: 023 mmHg      Is BP treated: Yes      HDL Cholesterol: 61 mg/dL      Total Cholesterol: 132 mg/dL

## 2023-10-17 NOTE — ASSESSMENT & PLAN NOTE
Continues on potassium supplementation.   Lab Results   Component Value Date    SODIUM 138 04/07/2023    K 4.3 04/07/2023     04/07/2023    CO2 29 04/07/2023    BUN 16 04/07/2023    CREATININE 1.02 04/07/2023    GLUC 83 12/12/2016    CALCIUM 9.9 04/07/2023

## 2023-10-17 NOTE — ASSESSMENT & PLAN NOTE
EKG performed in the office today due to bradycardia. Patient does have a Mobitz 1 second-degree AV block noted. He has never seen cardiology. I will refer him to cardiology at this time.

## 2023-10-17 NOTE — ASSESSMENT & PLAN NOTE
The patient recently had carotid duplex studies performed for surveillance. He currently now has greater than 50% stenosis on the left. He has an upcoming appointment with vascular. 9/5/2023    Impression     RIGHT:  There is <50% stenosis noted in the internal carotid artery. Plaque is  heterogenous and irregular. Vertebral artery flow is antegrade. There is no significant subclavian artery  disease. LEFT:  There is <50% stenosis noted in the internal carotid artery. Plaque is  heterogenous and irregular. Vertebral artery flow is antegrade. There is >50% stenosis in the subclavian  artery. In comparison to the study of 01/09/2022, the >50% stenosis in the left  subclavian is a new finding.

## 2023-10-17 NOTE — ASSESSMENT & PLAN NOTE
Lab Results   Component Value Date    EGFR 70 04/07/2023    EGFR 59 04/20/2022    EGFR 64 03/17/2022    CREATININE 1.02 04/07/2023    CREATININE 1.18 04/20/2022    CREATININE 1.11 03/17/2022   Patient aware of nephrotoxins. Low-salt diet.

## 2023-10-17 NOTE — PATIENT INSTRUCTIONS
DASH Eating Plan   WHAT YOU NEED TO KNOW:   The DASH (Dietary Approaches to Stop Hypertension) Eating Plan is designed to help prevent or lower high blood pressure. It can also help to lower LDL (bad) cholesterol and decrease your risk for heart disease. The plan is low in sodium, sugar, unhealthy fats, and total fat. It is high in potassium, calcium, magnesium, and fiber. These nutrients are added when you eat more fruits, vegetables, and whole grains. With the DASH eating plan, you need to eat a certain number of servings from each food group. This will help you get enough of certain nutrients and limit others. The amount of servings you should eat depends on how many calories you need. Your dietitian can help you create meal plans with the right number of servings for each food group. DISCHARGE INSTRUCTIONS:   What you need to know about sodium:  Your dietitian will tell you how much sodium is safe for you to have each day. People with high blood pressure should have no more than 1,500 to 2,300 mg of sodium in a day. A teaspoon (tsp) of salt has 2,300 mg of sodium. This may seem like a difficult goal, but small changes to the foods you eat can make a big difference. Your healthcare provider or dietitian can help you create a meal plan that follows your sodium limit. Read food labels. Food labels can help you choose foods that are low in sodium. The amount of sodium is listed in milligrams (mg). The % Daily Value (DV) column tells you how much of your daily needs are met by 1 serving of the food for each nutrient listed. Choose foods that have less than 5% of the DV of sodium. These foods are considered low in sodium. Foods that have 20% or more of the DV of sodium are considered high in sodium. Avoid foods that have more than 300 mg of sodium in each serving. Choose foods that say low-sodium, reduced-sodium, or no salt added on the food label. Limit added salt.   Do not salt food at the table if you add salt when you cook. Use herbs and spices, such as onions, garlic, and salt-free seasonings to add flavor. Try lemon or lime juice or vinegar to add a tart flavor. Use hot peppers or a small amount of hot pepper sauce to add a spicy flavor. Limit foods high in added salt, such as the following:    Seasonings made with salt, such as garlic salt, celery salt, onion salt, seasoned salt, meat tenderizers, and monosodium glutamate (MSG)    Miso soup and canned or dried soup mixes    Regular soy sauce, barbecue sauce, teriyaki sauce, steak sauce, Worcestershire sauce, and most flavored vinegars    Snack foods, such as salted chips, popcorn, pretzels, pork rinds, salted crackers, and salted nuts    Frozen foods, such as dinners, entrees, vegetables with sauces, and breaded meats    Ask about salt substitutes. Ask your healthcare provider if you may use salt substitutes. Some salt substitutes have ingredients that can be harmful if you have certain health conditions. Choose foods carefully at restaurants. Meals from restaurants, especially fast food restaurants, are often high in sodium. Some restaurants have nutrition information that tells you the amount of sodium in their foods. Ask to have your food prepared with less, or no salt. What you need to know about fats:  Healthy fats include unsaturated fats and omega-3 fatty acids. Unhealthy fats include saturated fats and trans fats.   Include healthy fats, such as the following:      Cooking oils, such as soybean, canola, olive, or sunflower    Fatty fish, such as salmon, tuna, mackerel, or sardines    Flaxseed oil or ground flaxseed    ½ cup of cooked beans, such as black beans, kidney beans, or brown beans    1½ ounces of low-sodium nuts, such as almonds or walnuts    Low-sugar, low-sodium peanut butter    Seeds such as domitila seeds or sunflower seeds       Limit or do not have unhealthy fats, such as the following:      Foods that contain fat from animals, such as fatty meats, whole milk, butter, and cream    Shortening, stick margarine, palm oil, and coconut oil    Full-fat or creamy salad dressing    Creamy soup    Crackers, chips, and baked goods made with margarine or shortening    Foods that are fried in unhealthy fats    Gravy and sauces, such as Hoang or cheese sauces    What you need to know about carbohydrates (carbs): All carbs break down into sugar. Complex carbs contain more fiber than simple carbs. This means complex carbs go into the bloodstream more slowly and cause less of a blood sugar spike. Try to include more complex carbs and fewer simple carbs. Include complex carbs, such as the followin slice of whole-grain bread    1 ounce of dry cereal that does not contain added sugar    ½ cup of cooked oatmeal    2 ounces of cooked whole-grain pasta    ½ cup of cooked brown rice    Limit or do not have simple carbs, such as the following:      [employer], such as doughnuts, pastries, and cookies    Mixes for cornbread and biscuits    White rice and pasta mixes, such as boxed macaroni and cheese    Instant and cold cereals that contain sugar    Jelly, jam, and ice cream that contain sugar    Condiments such as ketchup    Drinks high in sugar, such as soft drinks, lemonade, and fruit juice    What you need to know about vegetables and fruits:  Vegetables and fruits can be fresh, frozen, or canned. If possible, try to choose low-sodium canned options.   Include a variety of vegetables and fruits, such as the followin medium apple, pear, or peach (about ½ cup chopped)    ½ small banana    ½ cup berries, such as blueberries, strawberries, or blackberries    1 cup of raw leafy greens, such as lettuce, spinach, kale, or nevin greens    ½ cup of frozen or canned (no added salt) vegetables, such as green beans    ½ cup of fresh, frozen, or canned fruit (canned in light syrup or fruit juice)    ½ cup of vegetable or fruit juice    Limit or do not have vegetables and fruits made in the following ways:      Frozen fruit such as cherries that have added sugar    Fruit in cream or butter sauce    Canned vegetables that are high in sodium    Sauerkraut, pickled vegetables, and other foods prepared in brine    Fried vegetables or vegetables in butter or high-fat sauces    What you need to know about protein foods: Include lean or low-fat protein foods, such as the following:      Poultry (chicken, turkey) with no skin    Fish (especially fatty fish, such as salmon, fresh tuna, or mackerel)    Lean beef and pork (loin, round, extra lean hamburger)    Egg whites and egg substitutes    1 cup of nonfat (skim) or 1% milk    1½ ounces of fat-free or low-fat cheese    6 ounces of nonfat or low-fat yogurt    Limit or do not have high-fat protein foods, such as the following:      Smoked or cured meat, such as corned beef, sands, ham, hot dogs, and sausage    Canned beans and canned meats or spreads, such as potted meats, sardines, anchovies, and imitation seafood    Deli or lunch meats, such as bologna, ham, turkey, and roast beef    High-fat meat (T-bone steak, regular hamburger, and ribs)    Whole eggs and egg yolks    Whole milk, 2% milk, and cream    Regular cheese and processed cheese    Other guidelines to follow:   Maintain a healthy weight. Your risk for heart disease is higher if you have extra weight. Ask your healthcare provider what a healthy weight is for you. Your provider may suggest that you lose weight. You can lose weight by eating fewer calories and foods that have added sugars and fat. The DASH meal plan can help you do this. Decrease calories by eating smaller portions at each meal and fewer snacks. Ask your provider for more information about how to lose weight. Exercise regularly. Regular exercise can help you reach or maintain a healthy weight.  Regular exercise can also help decrease your blood pressure and improve your cholesterol levels. Get 30 minutes or more of moderate exercise each day of the week. To lose weight, get at least 60 minutes of exercise. Talk to your healthcare provider about the best exercise program for you. Limit alcohol. Women should limit alcohol to 1 drink a day. Men should limit alcohol to 2 drinks a day. A drink of alcohol is 12 ounces of beer, 5 ounces of wine, or 1½ ounces of liquor. For more information:   [facility] and [address]  [id number]  [place] [address]  Phone: [phone]  Web Address: [email or web address]    © Copyright [employer] 2023 Information is for End User's use only and may not be sold, redistributed or otherwise used for commercial purposes. The above information is an  only. It is not intended as medical advice for individual conditions or treatments. Talk to your doctor, nurse or pharmacist before following any medical regimen to see if it is safe and effective for you. Hypertension   WHAT YOU NEED TO KNOW:   Hypertension is high blood pressure. Your blood pressure is the force of your blood moving against the walls of your arteries. Hypertension causes your blood pressure to get so high that your heart has to work much harder than normal. This can damage your heart. Hypertension that does not respond to medicines and lifestyle changes is called resistant hypertension. Hypertension is considered chronic when it continues for 3 months or longer. DISCHARGE INSTRUCTIONS:   Call your local emergency number ([phone] in the [address]) or have someone call if:   You have chest pain.     You have any of the following signs of a heart attack:      Squeezing, pressure, or pain in your chest    You may  also have any of the following:     Discomfort or pain in your back, neck, jaw, stomach, or arm    Shortness of breath    Nausea or vomiting    Lightheadedness or a sudden cold sweat    You become confused or have trouble speaking. You suddenly feel lightheaded or have trouble breathing. Return to the emergency department if:   You have a severe headache or vision loss. You have weakness in an arm or leg. Call your doctor or cardiologist if:   You feel faint, dizzy, confused, or drowsy. You have been taking your blood pressure medicine but your pressure is higher than your provider says it should be. You have questions or concerns about your condition or care. Medicines: You may  need any of the following:  Antihypertensives  may be used to help lower your blood pressure. Several kinds of medicines are available. Your healthcare provider will choose medicines based on the kind of hypertension you have. You may need more than one type of medicine. Take the medicine exactly as directed. Diuretics  help decrease extra fluid that collects in your body. This will help lower your blood pressure. You may urinate more often while you take this medicine. Cholesterol medicine  helps lower your cholesterol level. A low cholesterol level helps prevent heart disease and makes it easier to control your blood pressure. Take your medicine as directed. Contact your healthcare provider if you think your medicine is not helping or if you have side effects. Tell your provider if you are allergic to any medicine. Keep a list of the medicines, vitamins, and herbs you take. Include the amounts, and when and why you take them. Bring the list or the pill bottles to follow-up visits. Carry your medicine list with you in case of an emergency. Follow up with your doctor or cardiologist as directed: You will need to return to have your blood pressure checked and to have other lab tests done. Write down your questions so you remember to ask them during your visits.   Stages of hypertension:  Your healthcare provider will give you a blood pressure goal based on your age, health, and risk for cardiovascular disease. The following are general guidelines on the stages of hypertension:  Normal blood pressure is 119/79 or lower . Your healthcare provider may only check your blood pressure each year if it stays at a normal level. Elevated blood pressure is 120/79 to 129/79 . This is sometimes called prehypertension. Your healthcare provider may suggest lifestyle changes to help lower your blood pressure to a normal level. He or she may then check it again in 3 to 6 months. Stage 1 hypertension is 130/80  to 139/89 . Your provider may recommend lifestyle changes, medication, and checks every 3 to 6 months until your blood pressure is controlled. Stage 2 hypertension is 140/90 or higher . Your provider will recommend lifestyle changes and have you take 2 kinds of hypertension medicines. You will also need to have your blood pressure checked monthly until it is controlled. Manage hypertension:   Check your blood pressure at home. Do not smoke, have caffeine, or exercise for at least 30 minutes before you check your blood pressure. Sit and rest for 5 minutes before you check your blood pressure. Extend your arm and support it on a flat surface. Your arm should be at the same level as your heart. Follow the directions that came with your blood pressure monitor. Check your blood pressure 2 times, 1 minute apart, before you take your medicine in the morning. Also check your blood pressure before your evening meal. Keep a record of your readings and bring it to your follow-up visits. Ask your healthcare provider what your blood pressure should be. Manage any other health conditions you have. Health conditions such as diabetes can increase your risk for hypertension. Follow your healthcare provider's instructions and take all your medicines as directed. Ask about all medicines. Certain medicines can increase your blood pressure.  Examples include oral birth control pills, decongestants, herbal supplements, and NSAIDs, such as ibuprofen. Your healthcare provider can tell you which medicines are safe for you to take. This includes prescription and over-the-counter medicines. Lifestyle changes you can make to manage hypertension:  Your healthcare provider may recommend you work with a team to manage hypertension. The team may include medical experts such as a dietitian, an exercise or physical therapist, and a behavior therapist. Your family members may be included in helping you create lifestyle changes. Limit sodium (salt) as directed. Too much sodium can affect your fluid balance. Check labels to find low-sodium or no-salt-added foods. Some low-sodium foods use potassium salts for flavor. Too much potassium can also cause health problems. Your healthcare provider will tell you how much sodium and potassium are safe for you to have in a day. He or she may recommend that you limit sodium to 2,300 mg a day. Follow the meal plan recommended by your healthcare provider. A dietitian or your provider can give you more information on low-sodium plans or the DASH (Dietary Approaches to Stop Hypertension) eating plan. The DASH plan is low in sodium, processed sugar, unhealthy fats, and total fat. It is high in potassium, calcium, and fiber. These can be found in vegetables, fruit, and whole-grain foods. Be physically active throughout the day. Physical activity, such as exercise, can help control your blood pressure and your weight. Be physically active for at least 30 minutes per day, on most days of the week. Include aerobic activity, such as walking or riding a bicycle. Also include strength training at least 2 times each week. Your healthcare providers can help you create a physical activity plan. Decrease stress. This may help lower your blood pressure. Learn ways to relax, such as deep breathing or listening to music. Limit alcohol as directed.   Alcohol can increase your blood pressure. A drink of alcohol is 12 ounces of beer, 5 ounces of wine, or 1½ ounces of liquor. Do not smoke. Nicotine and other chemicals in cigarettes and cigars can increase your blood pressure and also cause lung damage. Ask your healthcare provider for information if you currently smoke and need help to quit. E-cigarettes or smokeless tobacco still contain nicotine. Talk to your healthcare provider before you use these products. © Copyright Hugh [employer]ins 2023 Information is for End User's use only and may not be sold, redistributed or otherwise used for commercial purposes. The above information is an  only. It is not intended as medical advice for individual conditions or treatments. Talk to your doctor, nurse or pharmacist before following any medical regimen to see if it is safe and effective for you. Chronic Kidney Disease   AMBULATORY CARE:   Chronic kidney disease (CKD)  is the gradual and permanent loss of kidney function. Normally, the kidneys remove fluid, chemicals, and waste from your blood. These wastes are turned into urine by your kidneys. CKD may worsen over time and lead to kidney failure. Common signs and symptoms include the following:   Changes in how often you need to urinate    Swelling in your arms, legs, or feet    Shortness of breath    Fatigue or weakness    Bad or bitter taste in your mouth    Nausea, vomiting, or loss of appetite    Call your local emergency number (911 in the [address]) if:   You have a seizure. You have shortness of breath. Seek care immediately if:   You are confused and very drowsy. Call your doctor or nephrologist if:   You suddenly gain or lose more weight than your healthcare provider has told you is okay. You have itchy skin or a rash. You urinate more or less than you normally do. You have blood in your urine. You have nausea and are vomiting. You have fatigue or muscle weakness.     You have hiccups that will not stop. You have questions or concerns about your condition or care. How CKD is diagnosed:  CKD has 5 stages. Your healthcare provider will use results from the following tests to find the stage of CKD you have:  Blood and urine tests  show how well your kidneys are working. They may also show the cause of your CKD. Ultrasound, CT scan, or MRI  pictures may be used to check your kidneys. You may be given contrast liquid to help your kidneys show up better in the pictures. Tell the healthcare provider if you have ever had an allergic reaction to contrast liquid. Do not enter the MRI room with anything metal. Metal can cause serious injury. Tell the healthcare provider if you have any metal in or on your body. A biopsy  is a procedure to remove a small piece of tissue from your kidney. It is done to find the cause of your CKD. Treatment  can help control signs and symptoms, and prevent a worse stage of CKD. Your care team may include specialists, such as a dietitian or a heart specialist. This depends on the stage of your CKD and if you have other health conditions to manage. Healthcare providers will work with you to create a plan based on your decisions for treatment. Your treatment plan may include any of the following:  Medicines  may be given to decrease your blood pressure and get rid of extra fluid. You may also receive medicine to manage health conditions that may occur with CKD, such as anemia, diabetes, and heart disease. Dialysis  is a treatment to remove chemicals and waste from your blood when your kidneys can no longer do this. Surgery  may be needed to create an arteriovenous fistula (AVF) in your arm or insert a catheter into your abdomen. This is done so you can receive dialysis. A kidney transplant  may be done if your CKD becomes severe. What you can do to manage CKD: Management may include making some lifestyle changes.  Tell your healthcare provider if you have any concerns about being able to make changes. He or she can help you find solutions, including working with specialists. Ask for help creating a plan to break large goals into smaller steps. Your plan may include any of the following:  Manage other health conditions. Your healthcare provider will work with you to make a care plan that meets your needs. You will be checked regularly for heart disease or other conditions that can make CKD worse, such as diabetes. Your blood pressure will be closely monitored. You will also get a target blood pressure and help making a plan to reach your target. This may include taking your blood pressure at home. Maintain a healthy weight. Your weight and body mass index (BMI) will be checked regularly. BMI helps find if your weight is healthy for your height. Your healthcare provider will use other tests to check your muscle and protein levels. Extra weight can strain your kidneys. A low weight or low muscle mass can make you feel more tired. You may have trouble doing your daily activities. Ask your provider what a healthy weight is for you. He or she can help you create a plan to lose or gain weight safely, if needed. The plan may include keeping a food diary. This is a list of foods and liquids you have each day. Your provider will use the diary to help you make changes, if needed. Changes are based on your health and any other conditions you have, such as diabetes. Create an exercise plan. Regular exercise can help you manage CKD, high blood pressure, and diabetes. Exercise also helps control weight. Your provider can help you create exercise goals and a plan to reach those goals. For example, your goal may be to exercise for 30 minutes in a day. Your plan can include breaking exercise into 10 minute sessions, 3 times during the day. Create a healthy eating plan. Your provider may tell you to eat food low in potassium, phosphorus, or protein. Your provider may also recommend vitamin or mineral supplements. Do not take any supplements without talking to your provider. A dietitian can help you plan meals if needed. Ask how much liquid to drink each day and which liquids are best for you. Limit sodium (salt) as directed. You may need to limit sodium to less than 2,300 milligrams (mg) each day. Ask your dietitian or healthcare provider how much sodium you can have each day. The amount depends on your stage of kidney disease. Table salt, canned foods, soups, salted snacks, and processed meats, like deli meats and sausage, are high in sodium. Your provider or a dietitian can show you how to read food labels for sodium. Limit alcohol as directed. Alcohol can cause fluid retention and can affect your kidneys. Ask how much alcohol is safe for you. A drink of alcohol is 12 ounces of beer, 5 ounces of wine, or 1½ ounces of liquor. Do not smoke. Nicotine and other chemicals in cigarettes and cigars can cause kidney damage. Ask your provider for information if you currently smoke and need help to quit. E-cigarettes or smokeless tobacco still contain nicotine. Talk to your provider before you use these products. Ask about over-the-counter medicines. Medicines such as NSAIDs and laxatives may harm your kidneys. Some cough and cold medicines can raise your blood pressure. Always ask if a medicine is safe before you take it. Ask about vaccines you may need. CKD can increase your risk for infections such as pneumonia, influenza, and hepatitis. Vaccines lower your risk for infection. Your healthcare provider will tell you which vaccines you need and when to get them. Follow up with your doctor or nephrologist as directed: You will need to return for tests to monitor your kidney and nerve function, and your parathyroid hormone level. Your medicines may be changed, based on certain test results.  Write down your questions so you remember to ask them during your visits. © Copyright [employer] 2023 Information is for End User's use only and may not be sold, redistributed or otherwise used for commercial purposes. The above information is an  only. It is not intended as medical advice for individual conditions or treatments. Talk to your doctor, nurse or pharmacist before following any medical regimen to see if it is safe and effective for you.

## 2023-10-17 NOTE — ASSESSMENT & PLAN NOTE
Blood pressure in the office today is 130/88. The patient remains on his losartan and chlorthalidone. Information regarding the DASH diet provided to the patient.

## 2023-10-31 ENCOUNTER — CONSULT (OUTPATIENT)
Dept: VASCULAR SURGERY | Facility: CLINIC | Age: 78
End: 2023-10-31
Payer: MEDICARE

## 2023-10-31 VITALS
BODY MASS INDEX: 32.96 KG/M2 | SYSTOLIC BLOOD PRESSURE: 138 MMHG | WEIGHT: 210 LBS | HEIGHT: 67 IN | HEART RATE: 84 BPM | DIASTOLIC BLOOD PRESSURE: 80 MMHG

## 2023-10-31 DIAGNOSIS — I77.1 SUBCLAVIAN ARTERY STENOSIS, LEFT (HCC): ICD-10-CM

## 2023-10-31 DIAGNOSIS — I65.23 CAROTID STENOSIS, ASYMPTOMATIC, BILATERAL: ICD-10-CM

## 2023-10-31 PROCEDURE — 99203 OFFICE O/P NEW LOW 30 MIN: CPT | Performed by: NURSE PRACTITIONER

## 2023-10-31 NOTE — ASSESSMENT & PLAN NOTE
70-year-old male with HTN, HLD, CKD3, first-degree AV block, mild bilateral ICA stenosis, left subclavian artery stenosis, hearing impairment.  Patient referred by PCP for vascular evaluation     -CV duplex 9/5/23 showed bilateral ICA less than 50% stenosis and left subclavian artery greater than 50% stenosis  -Patient found to have mild bilateral carotid stenosis on screening several years ago and has been followed by PCP  -New onset greater than 50% left subclavian artery stenosis  -Patient is asymptomatic from a carotid and subclavian artery standpoint therefore recommended continued surveillance and medical management   -BP goal <140/<90   -Continue ASA and statin therapy   -Will repeat duplex in 1 year and follow up in the office in 1 year

## 2023-10-31 NOTE — ASSESSMENT & PLAN NOTE
-Left subclavian artery stenosis greater than 50% on recent carotid duplex  -Asymptomatic from a subclavian artery standpoint  -Recommended blood pressure check in right arm for accuracy  -Palpable brachial radial pulse bilaterally  -Continue ASA and statin therapy   -Will repeat duplex in 1 year  -Follow-up in the office in 1 year

## 2023-10-31 NOTE — ASSESSMENT & PLAN NOTE
-Total cholesterol 132, triglycerides 86, HDL 61, LDL 54  -Dietary modifications  -Continue statin therapy

## 2023-10-31 NOTE — PATIENT INSTRUCTIONS
Check blood pressure in the right arm for accuracy   Evaluate with carotid duplex in 1 year Carotid Artery Disease   AMBULATORY CARE:   Carotid artery disease (CAD)  means the major blood vessels in your neck are narrowed or becoming blocked. These 2 major blood vessels are called the carotid arteries. They supply your brain with blood. The narrow or blocked blood vessels increase your risk for a stroke. CAD is also called carotid artery stenosis. Have someone call your local emergency number (911 in the [address]) if:   You have any of the following signs of a stroke:      Numbness or drooping on one side of your face     Weakness in an arm or leg    Confusion or difficulty speaking    Dizziness, a severe headache, or vision loss    You have any of the following signs of a heart attack:      Squeezing, pressure, or pain in your chest    You may  also have any of the following:     Discomfort or pain in your back, neck, jaw, stomach, or arm    Shortness of breath    Nausea or vomiting    Lightheadedness or a sudden cold sweat    Call your doctor if:   You have questions or concerns about your condition or care. Common signs and symptoms:  CAD develops slowly. You may have no signs or symptoms until you have a mini-stroke, or transient ischemic attack (TIA). A TIA is a temporary lack of blood flow to your brain. A TIA goes away quickly and does not cause permanent damage. A TIA may be a warning sign that you are about to have a stroke. If you have any symptoms of a TIA or stroke, seek care immediately. Warning signs of a stroke:   The words BE FAST can help you remember and recognize warning signs of a stroke:  B = Balance:  Sudden loss of balance    E = Eyes:  Loss of vision in one or both eyes    F = Face:  Face droops on one side    A = Arms:  Arm drops when both arms are raised    S = Speech:  Speech is slurred or sounds different    T = Time:  Time to get help immediately       Treatment  depends on how narrow your arteries have become. Treatment also depends on your symptoms and your general health. The goal of treatment is to lower your risk for a stroke. You may need any of the following:  Medicines:      Aspirin,  or other blood thinner, may be recommended. These will help prevent blood clots from forming in your carotid arteries. If your healthcare provider wants you to take aspirin, do not take acetaminophen or ibuprofen instead. Cholesterol medicine  lowers your cholesterol level. Blood pressure medicine  lowers or helps control your blood pressure. Procedures  can help open blocked arteries:     Carotid endarterectomy (CEA)  is used to cut and remove plaque buildup from your arteries. Carotid angioplasty and stenting (DARIO)  is used to push the plaque against the artery wall with a balloon device. Then, a stent is placed to keep the artery open. A stent is a small metal mesh tube. Prevent a stroke:   Do not smoke, and avoid secondhand smoke. Nicotine and other chemicals in cigarettes and cigars increase your risk for a stroke. Ask your healthcare provider for information if you currently smoke and need help to quit. E-cigarettes or smokeless tobacco still contain nicotine. Talk to your healthcare provider before you use these products. Eat a variety of healthy foods. Healthy foods include fruit, vegetables, whole-grain breads, low-fat dairy products, chicken, and fish. Choose fish that are high in omega-3 fatty acids, such as salmon and fresh tuna. Ask your healthcare provider for more information on a heart healthy diet and the DASH eating plan. Limit sodium (salt). Sodium may increase your blood pressure. Add less table salt to your foods. Read food labels and choose foods that are low in sodium. Your healthcare provider may suggest you follow a low sodium diet. Reach or maintain a healthy weight. Extra weight makes your heart work harder.  Ask your healthcare provider what a healthy weight is for you. He or she can help you create a safe weight loss plan. Even a weight loss of 10% of your extra body weight can help your heart function better. Exercise regularly. Exercise helps improve heart function and can help you manage your weight. Exercise can also help lower your cholesterol and blood sugar levels. Try to get at least 30 minutes of exercise 5 times each week. Try to be physically active every day. This may include walking, riding a bicycle, or swimming. Your healthcare provider can help you create an exercise plan that works best for you. Limit alcohol. Alcohol can increase your blood pressure and triglyceride levels. A drink of alcohol is 12 ounces of beer, 5 ounces of wine, or 1½ ounces of liquor. Follow up with your doctor as directed:  Write down your questions so you remember to ask them during your visits. © Copyright [employer] 2023 Information is for End User's use only and may not be sold, redistributed or otherwise used for commercial purposes. The above information is an  only. It is not intended as medical advice for individual conditions or treatments. Talk to your doctor, nurse or pharmacist before following any medical regimen to see if it is safe and effective for you.

## 2023-11-21 ENCOUNTER — OFFICE VISIT (OUTPATIENT)
Dept: CARDIOLOGY CLINIC | Facility: CLINIC | Age: 78
End: 2023-11-21
Payer: MEDICARE

## 2023-11-21 ENCOUNTER — PROCEDURE VISIT (OUTPATIENT)
Dept: CARDIOLOGY CLINIC | Facility: CLINIC | Age: 78
End: 2023-11-21
Payer: MEDICARE

## 2023-11-21 VITALS
DIASTOLIC BLOOD PRESSURE: 76 MMHG | OXYGEN SATURATION: 98 % | WEIGHT: 211.7 LBS | BODY MASS INDEX: 33.23 KG/M2 | SYSTOLIC BLOOD PRESSURE: 140 MMHG | HEART RATE: 69 BPM | HEIGHT: 67 IN

## 2023-11-21 DIAGNOSIS — I65.23 CAROTID STENOSIS, ASYMPTOMATIC, BILATERAL: Chronic | ICD-10-CM

## 2023-11-21 DIAGNOSIS — I44.1 AV BLOCK, MOBITZ 1: ICD-10-CM

## 2023-11-21 DIAGNOSIS — I44.0 FIRST DEGREE AV BLOCK: Chronic | ICD-10-CM

## 2023-11-21 DIAGNOSIS — R00.1 BRADYCARDIA: ICD-10-CM

## 2023-11-21 DIAGNOSIS — R00.1 BRADYCARDIA: Primary | ICD-10-CM

## 2023-11-21 DIAGNOSIS — I65.23 BILATERAL CAROTID ARTERY STENOSIS: Primary | ICD-10-CM

## 2023-11-21 PROCEDURE — 99204 OFFICE O/P NEW MOD 45 MIN: CPT | Performed by: INTERNAL MEDICINE

## 2023-11-21 PROCEDURE — 93000 ELECTROCARDIOGRAM COMPLETE: CPT | Performed by: INTERNAL MEDICINE

## 2023-11-21 RX ORDER — ASCORBIC ACID 1000 MG
TABLET ORAL DAILY
COMMUNITY

## 2023-11-21 RX ORDER — ROSUVASTATIN CALCIUM 40 MG/1
40 TABLET, COATED ORAL DAILY
Qty: 90 TABLET | Refills: 3 | Status: SHIPPED | OUTPATIENT
Start: 2023-11-21

## 2023-11-21 NOTE — PROGRESS NOTES
Cardiology   Isabel Hill 66 y.o. male MRN: [id number]   Encounter: [id number]        Reason for Consult / Principal Problem: Bradycardia/heart block    Provider requesting Consult: VEGA Bates    PCP: VEGA Bates        Assessment/Plan:    >> Mobitz type I second-degree heart block noted on ECG: Currently he is in sinus rhythm with a first-degree heart block. EKG also shows voltage criteria for LVH.  >> Dyslipidemia  >> Carotid artery atherosclerosis  >> Hypertension    Plan:  -Check 48-hour Holter monitor to rule out any serious bradycardia arrhythmia  -Check TSH and Lyme titers to rule out any secondary cause of bradycardia/heart block  -Continue losartan 50 mg, continue chlorthalidone 25 mg with potassium supplementation, advised blood pressure monitoring at home, consider additional therapy if BP is persistently elevated  -Discontinue simvastatin and start rosuvastatin 40 mg, he needs to be on high intensity statin therapy Given the progression of his carotid disease.  -Return to office in 4 months with lab new lipid profile    CC: Bradycardia/heart block-Mobitz type I    HPI  66 y.o. male presents for evaluation of an abnormal ECG noted in his primary care nurse practitioner clinic. He has no complaints at this time. His ECG in the primary care's office reportedly demonstrated Mobitz type I second-degree heart block. Currently his ECG in the office shows sinus rhythm with a long first-degree heart block but fixed KS interval.    He has no complaints, is able to do yard work and all of his day-to-day work without any complaints or restrictions. He has no new onset shortness of breath or fatigue. He has does have carotid artery disease that has been followed up by vascular surgery and has had progression of carotid artery atherosclerosis.     He is hypertensive and takes chlorthalidone 25 mg (with potassium supplementation ), losartan 50 mg, and baby aspirin      The patient denies chest pain, shortness of breath, palpitations, orthopnea, PND, pedal edema, syncope, presyncope, diaphoresis, nausea/vomiting       The 10-year ASCVD risk score (Hayder TAYLOR, et al., 2019) is: 33.6%    Values used to calculate the score:      Age: 66 years      Sex: Male      Is Non- : No      Diabetic: No      Tobacco smoker: No      Systolic Blood Pressure: 318 mmHg      Is BP treated: Yes      HDL Cholesterol: 61 mg/dL      Total Cholesterol: 132 mg/dL            Physical exam  Objective   Vitals: Blood pressure 140/76, pulse 69, height 5' 7" (1.702 m), weight 96 kg (211 lb 11.2 oz), SpO2 98 %. General:  AO x3, no acute distress  Cardiac:  S1-S2 normal,  No murmurs, rubs or gallops, JVP: normal  Lungs:  Clear to auscultation bilaterally, no wheezing or crackles. Abdomen:  Soft, nontender, nondistended. Extremities:  Warm, well perfused, pulses palpable, no ulcers or rashes. Edema:absent  Neuro: Grossly nonfocall        ======================================================  TREADMILL STRESS  No results found for this or any previous visit.     ----------------------------------------------------------------------------------------------  NUCLEAR STRESS TEST: No results found for this or any previous visit. No results found for this or any previous visit.      --------------------------------------------------------------------------------  CATH:  No results found for this or any previous visit.    --------------------------------------------------------------------------------  ECHO:   No results found for this or any previous visit.     No results found for this or any previous visit.    --------------------------------------------------------------------------------  HOLTER  No results found for this or any previous visit.    --------------------------------------------------------------------------------  CAROTIDS  Results for orders placed during the hospital encounter of 09/05/23    VAS carotid complete study    Narrative  [facility] REPORT  CLINICAL:  Indications:  Surveillance of carotid artery disease. Patient is asymptomatic at this time. Operative History:  Patient denies any cardiovascular surgeries  Risk Factors  The patient has history of HTN and Hyperlipidemia. Clinical  Right Pressure:  134/70 mm Hg, Left Pressure:  132/70 mm Hg. FINDINGS:    Right        Impression  PSV  EDV (cm/s)  Direction of Flow  Ratio  Dist. ICA                 71          16                      0.78  Mid. ICA                  92          26                      1.02  Prox. ICA    1 - 49%     100          25                      1.11  Dist CCA                  92          21  Mid CCA                   90          21                      0.90  Prox CCA                 100          17                      0.63  Ext Carotid              150          11                      1.67  Prox Vert                 47          14  Antegrade  Subclavian               141           0  Innominate               158           0    Left         Impression  PSV  EDV (cm/s)  Direction of Flow  Ratio  Dist. ICA                 65          21                      0.61  Mid. ICA                  80          22                      0.74  Prox. ICA    1 - 49%     108          30                      1.01  Dist CCA                  86          17  Mid CCA                  108          19                      0.87  Prox CCA                 123          19  Ext Carotid              119          17                      1.10  Prox Vert                 52          16  Antegrade  Subclavian   1 - 49%     325           0        CONCLUSION:    Impression    RIGHT:  There is <50% stenosis noted in the internal carotid artery. Plaque is  heterogenous and irregular. Vertebral artery flow is antegrade. There is no significant subclavian artery  disease.     LEFT:  There is <50% stenosis noted in the internal carotid artery. Plaque is  heterogenous and irregular. Vertebral artery flow is antegrade. There is >50% stenosis in the subclavian  artery. In comparison to the study of 01/09/2022, the >50% stenosis in the left  subclavian is a new finding. SIGNATURE:  Electronically Signed by: Connor Karontri on 2023-09-05 12:34:14 PM       Diagnoses and all orders for this visit:    Bilateral carotid artery stenosis    AV block, Mobitz 1  -     Ambulatory Referral to Cardiology  -     POCT ECG  -     rosuvastatin (CRESTOR) 40 MG tablet; Take 1 tablet (40 mg total) by mouth daily  -     Holter monitor; Future  -     Lyme Total AB W Reflex to IGM/IGG; Future    Carotid stenosis, asymptomatic, bilateral  -     rosuvastatin (CRESTOR) 40 MG tablet; Take 1 tablet (40 mg total) by mouth daily    First degree AV block  -     Holter monitor; Future  -     TSH, 3rd generation with Free T4 reflex    Bradycardia  -     TSH, 3rd generation with Free T4 reflex    Other orders  -     Ginkgo Biloba 40 MG TABS; Take by mouth in the morning       ======================================================          Review of Systems  ROS as noted above, otherwise 12 point review of systems was performed and is negative.      Historical Information   Past Medical History:   Diagnosis Date    Allergy     last assessed - [id number]    Cataracts, bilateral 9/19/2017    Had bilateral cataract surgery    Dyspepsia     last assessed - [id number]    Hyperlipidemia     Hypertension     Onychomycosis     last assessed - [id number]    Plantar fasciitis      Past Surgical History:   Procedure Laterality Date    COLONOSCOPY  2004    complete colonoscopy    NEUROPLASTY / TRANSPOSITION MEDIAN NERVE AT CARPAL TUNNEL BILATERAL  2002    ROTATOR CUFF REPAIR Bilateral     Right - Resolved - Aug 2004; Left - Resolved - Feb 2006     Social History     Substance and Sexual Activity   Alcohol Use Yes     Social History     Substance and Sexual Activity   Drug Use No Social History     Tobacco Use   Smoking Status Never   Smokeless Tobacco Never     Family History   Problem Relation Age of Onset    Crohn's disease Mother        Meds/Allergies   Hospital Medications:   No current facility-administered medications for this visit. Home Medications: (Not in a hospital admission)      No Known Allergies      Portions of the record may have been created with voice recognition software. Occasional wrong words or "sound a like" substitutions may have occurred due to the inherent limitations of voice recognition software. Read the chart carefully and recognize, using context, where substitutions have occurred.

## 2023-11-21 NOTE — LETTER
November 21, 2023     Zaira Apple, [address] [facility]    Patient: [facility]   YOB: 1945   Date of Visit: 11/21/2023       Dear Ms. Tanvir Annabel: Thank you for referring Krista Hernandez to me for evaluation. Below are my notes for this consultation. If you have questions, please do not hesitate to call me. I look forward to following your patient along with you. Sincerely,        Agusto Cantu MD        CC: No Recipients    Agusto Cantu MD  11/21/2023  2:04 PM  Sign when Signing Visit  Cardiology   [facility] 66 y.o. male MRN: [id number]   Encounter: [id number]        Reason for Consult / Principal Problem: Bradycardia/heart block    Provider requesting Consult: VEGA Manley    PCP: VEGA Manley        Assessment/Plan:    >> Mobitz type I second-degree heart block noted on ECG: Currently he is in sinus rhythm with a first-degree heart block. EKG also shows voltage criteria for LVH.  >> Dyslipidemia  >> Carotid artery atherosclerosis  >> Hypertension    Plan:  -Check 48-hour Holter monitor to rule out any serious bradycardia arrhythmia  -Check TSH and Lyme titers to rule out any secondary cause of bradycardia/heart block  -Continue losartan 50 mg, continue chlorthalidone 25 mg with potassium supplementation, advised blood pressure monitoring at home, consider additional therapy if BP is persistently elevated  -Discontinue simvastatin and start rosuvastatin 40 mg, he needs to be on high intensity statin therapy Given the progression of his carotid disease.  -Return to office in 4 months with lab new lipid profile    CC: Bradycardia/heart block-Mobitz type I    HPI  66 y.o. male presents for evaluation of an abnormal ECG noted in his primary care nurse practitioner clinic. He has no complaints at this time. His ECG in the primary care's office reportedly demonstrated Mobitz type I second-degree heart block.    Currently his ECG in the office shows sinus rhythm with a long first-degree heart block but fixed CT interval.    He has no complaints, is able to do yard work and all of his day-to-day work without any complaints or restrictions. He has no new onset shortness of breath or fatigue. He has does have carotid artery disease that has been followed up by vascular surgery and has had progression of carotid artery atherosclerosis. He is hypertensive and takes chlorthalidone 25 mg (with potassium supplementation ), losartan 50 mg, and baby aspirin      The patient denies chest pain, shortness of breath, palpitations, orthopnea, PND, pedal edema, syncope, presyncope, diaphoresis, nausea/vomiting       The 10-year ASCVD risk score (Hayder TAYLOR, et al., 2019) is: 33.6%    Values used to calculate the score:      Age: 66 years      Sex: Male      Is Non- : No      Diabetic: No      Tobacco smoker: No      Systolic Blood Pressure: 836 mmHg      Is BP treated: Yes      HDL Cholesterol: 61 mg/dL      Total Cholesterol: 132 mg/dL            Physical exam  Objective  Vitals: Blood pressure 140/76, pulse 69, height 5' 7" (1.702 m), weight 96 kg (211 lb 11.2 oz), SpO2 98 %. General:  AO x3, no acute distress  Cardiac:  S1-S2 normal,  No murmurs, rubs or gallops, JVP: normal  Lungs:  Clear to auscultation bilaterally, no wheezing or crackles. Abdomen:  Soft, nontender, nondistended. Extremities:  Warm, well perfused, pulses palpable, no ulcers or rashes. Edema:absent  Neuro: Grossly nonfocall        ======================================================  TREADMILL STRESS  No results found for this or any previous visit.     ----------------------------------------------------------------------------------------------  NUCLEAR STRESS TEST: No results found for this or any previous visit.     No results found for this or any previous visit.      --------------------------------------------------------------------------------  CATH:  No results found for this or any previous visit.    --------------------------------------------------------------------------------  ECHO:   No results found for this or any previous visit. No results found for this or any previous visit.    --------------------------------------------------------------------------------  HOLTER  No results found for this or any previous visit.    --------------------------------------------------------------------------------  CAROTIDS  Results for orders placed during the hospital encounter of 09/05/23    VAS carotid complete study    Narrative  [facility] REPORT  CLINICAL:  Indications:  Surveillance of carotid artery disease. Patient is asymptomatic at this time. Operative History:  Patient denies any cardiovascular surgeries  Risk Factors  The patient has history of HTN and Hyperlipidemia. Clinical  Right Pressure:  134/70 mm Hg, Left Pressure:  132/70 mm Hg. FINDINGS:    Right        Impression  PSV  EDV (cm/s)  Direction of Flow  Ratio  Dist. ICA                 71          16                      0.78  Mid. ICA                  92          26                      1.02  Prox. ICA    1 - 49%     100          25                      1.11  Dist CCA                  92          21  Mid CCA                   90          21                      0.90  Prox CCA                 100          17                      0.63  Ext Carotid              150          11                      1.67  Prox Vert                 47          14  Antegrade  Subclavian               141           0  Innominate               158           0    Left         Impression  PSV  EDV (cm/s)  Direction of Flow  Ratio  Dist. ICA                 65          21                      0.61  Mid. ICA                  80          22                      0.74  Prox.  ICA    1 - 49%     108          30 1.01  Dist CCA                  86          17  Mid CCA                  108          19                      0.87  Prox CCA                 123          19  Ext Carotid              119          17                      1.10  Prox Vert                 52          16  Antegrade  Subclavian   1 - 49%     325           0        CONCLUSION:    Impression    RIGHT:  There is <50% stenosis noted in the internal carotid artery. Plaque is  heterogenous and irregular. Vertebral artery flow is antegrade. There is no significant subclavian artery  disease. LEFT:  There is <50% stenosis noted in the internal carotid artery. Plaque is  heterogenous and irregular. Vertebral artery flow is antegrade. There is >50% stenosis in the subclavian  artery. In comparison to the study of 01/09/2022, the >50% stenosis in the left  subclavian is a new finding. SIGNATURE:  Electronically Signed by: Lyn Iyer on 2023-09-05 12:34:14 PM       Diagnoses and all orders for this visit:    Bilateral carotid artery stenosis    AV block, Mobitz 1  -     Ambulatory Referral to Cardiology  -     POCT ECG  -     rosuvastatin (CRESTOR) 40 MG tablet; Take 1 tablet (40 mg total) by mouth daily  -     Holter monitor; Future  -     Lyme Total AB W Reflex to IGM/IGG; Future    Carotid stenosis, asymptomatic, bilateral  -     rosuvastatin (CRESTOR) 40 MG tablet; Take 1 tablet (40 mg total) by mouth daily    First degree AV block  -     Holter monitor; Future  -     TSH, 3rd generation with Free T4 reflex    Bradycardia  -     TSH, 3rd generation with Free T4 reflex    Other orders  -     Ginkgo Biloba 40 MG TABS; Take by mouth in the morning       ======================================================          Review of Systems  ROS as noted above, otherwise 12 point review of systems was performed and is negative.      Historical Information  Past Medical History:   Diagnosis Date   • Allergy     last assessed - [id number]   • Cataracts, bilateral 9/19/2017    Had bilateral cataract surgery   • Dyspepsia     last assessed - [id number]   • Hyperlipidemia    • Hypertension    • Onychomycosis     last assessed - [id number]   • Plantar fasciitis      Past Surgical History:   Procedure Laterality Date   • COLONOSCOPY  2004    complete colonoscopy   • NEUROPLASTY / TRANSPOSITION MEDIAN NERVE AT CARPAL TUNNEL BILATERAL  2002   • ROTATOR CUFF REPAIR Bilateral     Right - Resolved - Aug 2004; Left - Resolved - Feb 2006     Social History     Substance and Sexual Activity   Alcohol Use Yes     Social History     Substance and Sexual Activity   Drug Use No     Social History     Tobacco Use   Smoking Status Never   Smokeless Tobacco Never     Family History   Problem Relation Age of Onset   • Crohn's disease Mother        Meds/Allergies  Hospital Medications:   No current facility-administered medications for this visit. Home Medications: (Not in a hospital admission)      No Known Allergies      Portions of the record may have been created with voice recognition software. Occasional wrong words or "sound a like" substitutions may have occurred due to the inherent limitations of voice recognition software. Read the chart carefully and recognize, using context, where substitutions have occurred.

## 2023-12-01 ENCOUNTER — HOSPITAL ENCOUNTER (OUTPATIENT)
Dept: NON INVASIVE DIAGNOSTICS | Facility: HOSPITAL | Age: 78
Discharge: HOME | End: 2023-12-01
Payer: MEDICARE

## 2023-12-01 DIAGNOSIS — R00.1 BRADYCARDIA: ICD-10-CM

## 2023-12-01 PROCEDURE — 93225 XTRNL ECG REC<48 HRS REC: CPT

## 2023-12-01 PROCEDURE — 93227 XTRNL ECG REC<48 HR R&I: CPT | Performed by: INTERNAL MEDICINE

## 2023-12-01 PROCEDURE — 93226 XTRNL ECG REC<48 HR SCAN A/R: CPT

## 2023-12-11 ENCOUNTER — TELEPHONE (OUTPATIENT)
Dept: CARDIOLOGY CLINIC | Facility: CLINIC | Age: 78
End: 2023-12-11

## 2023-12-11 NOTE — TELEPHONE ENCOUNTER
----- Message from Felipe James MD sent at 12/11/2023 10:27 AM EST -----  Please arrange follow-up in 3 to 4 months.    ----- Message -----  From: Carole Julian MD  Sent: 12/1/2023   1:52 PM EST  To: Felipe James MD

## 2023-12-12 ENCOUNTER — TELEPHONE (OUTPATIENT)
Dept: FAMILY MEDICINE CLINIC | Facility: CLINIC | Age: 78
End: 2023-12-12

## 2023-12-13 ENCOUNTER — CLINICAL SUPPORT (OUTPATIENT)
Dept: FAMILY MEDICINE CLINIC | Facility: CLINIC | Age: 78
End: 2023-12-13
Payer: MEDICARE

## 2023-12-13 VITALS
HEART RATE: 75 BPM | OXYGEN SATURATION: 99 % | SYSTOLIC BLOOD PRESSURE: 120 MMHG | DIASTOLIC BLOOD PRESSURE: 72 MMHG | RESPIRATION RATE: 18 BRPM

## 2023-12-13 DIAGNOSIS — I10 BENIGN ESSENTIAL HYPERTENSION: Primary | Chronic | ICD-10-CM

## 2023-12-13 PROCEDURE — 99211 OFF/OP EST MAY X REQ PHY/QHP: CPT

## 2023-12-31 DIAGNOSIS — I10 BENIGN ESSENTIAL HYPERTENSION: ICD-10-CM

## 2023-12-31 RX ORDER — LOSARTAN POTASSIUM 50 MG/1
TABLET ORAL
Qty: 90 TABLET | Refills: 3 | Status: SHIPPED | OUTPATIENT
Start: 2023-12-31

## 2024-03-14 ENCOUNTER — APPOINTMENT (OUTPATIENT)
Dept: LAB | Facility: CLINIC | Age: 79
End: 2024-03-14
Payer: MEDICARE

## 2024-03-14 DIAGNOSIS — I10 BENIGN ESSENTIAL HYPERTENSION: ICD-10-CM

## 2024-03-14 DIAGNOSIS — I44.1 AV BLOCK, MOBITZ 1: ICD-10-CM

## 2024-03-14 DIAGNOSIS — Z12.5 PROSTATE CANCER SCREENING: ICD-10-CM

## 2024-03-14 DIAGNOSIS — E78.00 HYPERCHOLESTEROLEMIA: ICD-10-CM

## 2024-03-14 DIAGNOSIS — N18.31 STAGE 3A CHRONIC KIDNEY DISEASE (HCC): ICD-10-CM

## 2024-03-14 DIAGNOSIS — E87.6 HYPOKALEMIA: ICD-10-CM

## 2024-03-14 LAB
ALBUMIN SERPL BCP-MCNC: 4.6 G/DL (ref 3.5–5)
ALP SERPL-CCNC: 46 U/L (ref 34–104)
ALT SERPL W P-5'-P-CCNC: 20 U/L (ref 7–52)
ANION GAP SERPL CALCULATED.3IONS-SCNC: 10 MMOL/L (ref 4–13)
AST SERPL W P-5'-P-CCNC: 22 U/L (ref 13–39)
B BURGDOR IGG+IGM SER QL IA: NEGATIVE
BASOPHILS # BLD AUTO: 0.02 THOUSANDS/ÂΜL (ref 0–0.1)
BASOPHILS NFR BLD AUTO: 0 % (ref 0–1)
BILIRUB SERPL-MCNC: 0.6 MG/DL (ref 0.2–1)
BUN SERPL-MCNC: 23 MG/DL (ref 5–25)
CALCIUM SERPL-MCNC: 9.7 MG/DL (ref 8.4–10.2)
CHLORIDE SERPL-SCNC: 102 MMOL/L (ref 96–108)
CHOLEST SERPL-MCNC: 145 MG/DL
CO2 SERPL-SCNC: 28 MMOL/L (ref 21–32)
CREAT SERPL-MCNC: 1.22 MG/DL (ref 0.6–1.3)
EOSINOPHIL # BLD AUTO: 0.38 THOUSAND/ÂΜL (ref 0–0.61)
EOSINOPHIL NFR BLD AUTO: 6 % (ref 0–6)
ERYTHROCYTE [DISTWIDTH] IN BLOOD BY AUTOMATED COUNT: 11.7 % (ref 11.6–15.1)
GFR SERPL CREATININE-BSD FRML MDRD: 56 ML/MIN/1.73SQ M
GLUCOSE P FAST SERPL-MCNC: 95 MG/DL (ref 65–99)
HCT VFR BLD AUTO: 40.7 % (ref 36.5–49.3)
HDLC SERPL-MCNC: 61 MG/DL
HGB BLD-MCNC: 14.1 G/DL (ref 12–17)
IMM GRANULOCYTES # BLD AUTO: 0.01 THOUSAND/UL (ref 0–0.2)
IMM GRANULOCYTES NFR BLD AUTO: 0 % (ref 0–2)
LDLC SERPL CALC-MCNC: 64 MG/DL (ref 0–100)
LYMPHOCYTES # BLD AUTO: 1.11 THOUSANDS/ÂΜL (ref 0.6–4.47)
LYMPHOCYTES NFR BLD AUTO: 17 % (ref 14–44)
MCH RBC QN AUTO: 30.8 PG (ref 26.8–34.3)
MCHC RBC AUTO-ENTMCNC: 34.6 G/DL (ref 31.4–37.4)
MCV RBC AUTO: 89 FL (ref 82–98)
MONOCYTES # BLD AUTO: 0.53 THOUSAND/ÂΜL (ref 0.17–1.22)
MONOCYTES NFR BLD AUTO: 8 % (ref 4–12)
NEUTROPHILS # BLD AUTO: 4.37 THOUSANDS/ÂΜL (ref 1.85–7.62)
NEUTS SEG NFR BLD AUTO: 69 % (ref 43–75)
NRBC BLD AUTO-RTO: 0 /100 WBCS
PLATELET # BLD AUTO: 240 THOUSANDS/UL (ref 149–390)
PMV BLD AUTO: 9.9 FL (ref 8.9–12.7)
POTASSIUM SERPL-SCNC: 3.7 MMOL/L (ref 3.5–5.3)
PROT SERPL-MCNC: 7.1 G/DL (ref 6.4–8.4)
PSA SERPL-MCNC: 3.56 NG/ML (ref 0–4)
RBC # BLD AUTO: 4.58 MILLION/UL (ref 3.88–5.62)
SODIUM SERPL-SCNC: 140 MMOL/L (ref 135–147)
TRIGL SERPL-MCNC: 100 MG/DL
TSH SERPL DL<=0.05 MIU/L-ACNC: 3.37 UIU/ML (ref 0.45–4.5)
WBC # BLD AUTO: 6.42 THOUSAND/UL (ref 4.31–10.16)

## 2024-03-14 PROCEDURE — 86618 LYME DISEASE ANTIBODY: CPT

## 2024-03-14 PROCEDURE — 80053 COMPREHEN METABOLIC PANEL: CPT

## 2024-03-14 PROCEDURE — 36415 COLL VENOUS BLD VENIPUNCTURE: CPT

## 2024-03-14 PROCEDURE — 85025 COMPLETE CBC W/AUTO DIFF WBC: CPT

## 2024-03-14 PROCEDURE — 80061 LIPID PANEL: CPT

## 2024-03-14 PROCEDURE — G0103 PSA SCREENING: HCPCS

## 2024-03-22 ENCOUNTER — OFFICE VISIT (OUTPATIENT)
Dept: CARDIOLOGY CLINIC | Facility: CLINIC | Age: 79
End: 2024-03-22
Payer: MEDICARE

## 2024-03-22 VITALS
WEIGHT: 217.7 LBS | OXYGEN SATURATION: 98 % | DIASTOLIC BLOOD PRESSURE: 80 MMHG | BODY MASS INDEX: 34.17 KG/M2 | SYSTOLIC BLOOD PRESSURE: 136 MMHG | HEART RATE: 78 BPM | HEIGHT: 67 IN

## 2024-03-22 DIAGNOSIS — N18.31 STAGE 3A CHRONIC KIDNEY DISEASE (HCC): ICD-10-CM

## 2024-03-22 DIAGNOSIS — I44.1 HEART BLOCK AV SECOND DEGREE: Primary | ICD-10-CM

## 2024-03-22 DIAGNOSIS — I77.1 SUBCLAVIAN ARTERY STENOSIS, LEFT (HCC): ICD-10-CM

## 2024-03-22 DIAGNOSIS — I44.0 FIRST DEGREE AV BLOCK: Chronic | ICD-10-CM

## 2024-03-22 PROCEDURE — 93000 ELECTROCARDIOGRAM COMPLETE: CPT | Performed by: INTERNAL MEDICINE

## 2024-03-22 PROCEDURE — 99215 OFFICE O/P EST HI 40 MIN: CPT | Performed by: INTERNAL MEDICINE

## 2024-03-22 NOTE — LETTER
March 22, 2024     VEGA Reyes  [address] [facility] [address]    Patient: Stefan Loving   YOB: 1945   Date of Visit: 3/22/2024       Dear MsJovi Sanchez:    Thank you for referring Stefan Loving to me for evaluation. Below are my notes for this consultation.    If you have questions, please do not hesitate to call me. I look forward to following your patient along with you.         Sincerely,        Keyon Muñoz MD        CC: No Recipients    Keyon Muñoz MD  3/22/2024  4:38 PM  Sign when Signing Visit  Cardiology   Stefan Loving 78 y.o. male MRN: [id number]   Encounter: [id number]      Assessment/Plan:    >> Mobitz type I second-degree heart block noted on ECG: Currently he is in sinus rhythm with a first-degree heart block.  EKG also shows voltage criteria for LVH.  >> Dyslipidemia  >> Carotid artery atherosclerosis  >> Hypertension    Plan:    - No evidence of heart block on Holter  - His current ECG is Mobitz 1 with narrow QRS  - Continue losartan 50 mg, continue chlorthalidone 25 mg with potassium supplementation, advised continued blood pressure monitoring at home,  - Continue Crestor 40 mg daily, consider adding zetia for target LDL < 55 mg/dl  - Will refer to EP for consideration of EP study to evaluate for infranodal disease/potential pacemaker given his age.       3/22/24: No complaints. Has been shoveling snow during recent snowstorms and has not had any significant limitations.     No episodes of syncope or presyncope.     His lyme titers were negative, his thyroid function was normal . Holter did not demonstrate any advanced heart block.     CC: Bradycardia/heart block-Mobitz type I    HPI  78 y.o. male presents for evaluation of an abnormal ECG noted in his primary care nurse practitioner clinic.  He has no complaints at this time.  His ECG in the primary care's office reportedly demonstrated Mobitz type I second-degree heart block.   Currently his ECG in the  "office shows sinus rhythm with a long first-degree heart block but fixed AK interval.    He has no complaints, is able to do yard work and all of his day-to-day work without any complaints or restrictions.  He has no new onset shortness of breath or fatigue.    He has does have carotid artery disease that has been followed up by vascular surgery and has had progression of carotid artery atherosclerosis.    He is hypertensive and takes chlorthalidone 25 mg (with potassium supplementation ), losartan 50 mg, and baby aspirin      The patient denies chest pain, shortness of breath, palpitations, orthopnea, PND, pedal edema, syncope, presyncope, diaphoresis, nausea/vomiting       The 10-year ASCVD risk score (Hayder TAYLOR, et al., 2019) is: 32.8%    Values used to calculate the score:      Age: 78 years      Sex: Male      Is Non- : No      Diabetic: No      Tobacco smoker: No      Systolic Blood Pressure: 136 mmHg      Is BP treated: Yes      HDL Cholesterol: 61 mg/dL      Total Cholesterol: 145 mg/dL            Physical exam  Objective   Vitals: Blood pressure 136/80, pulse 78, height 5' 7\" (1.702 m), weight 98.7 kg (217 lb 11.2 oz), SpO2 98%.    General:  AO x3, no acute distress  Cardiac:  S1-S2 normal,  No murmurs, rubs or gallops, JVP: normal  Lungs:  Clear to auscultation bilaterally, no wheezing or crackles.  Abdomen:  Soft, nontender, nondistended.  Extremities:  Warm, well perfused, pulses palpable, no ulcers or rashes. Edema:absent  Neuro: Grossly nonfocall        ======================================================  TREADMILL STRESS  No results found for this or any previous visit.     ----------------------------------------------------------------------------------------------  NUCLEAR STRESS TEST: No results found for this or any previous visit.    No results found for this or any previous " visit.      --------------------------------------------------------------------------------  CATH:  No results found for this or any previous visit.    --------------------------------------------------------------------------------  ECHO:   No results found for this or any previous visit.    No results found for this or any previous visit.    --------------------------------------------------------------------------------  HOLTER  No results found for this or any previous visit.    --------------------------------------------------------------------------------  CAROTIDS  Results for orders placed during the hospital encounter of 09/05/23    VAS carotid complete study    Narrative  [facility]PORT  CLINICAL:  Indications:  Surveillance of carotid artery disease.  Patient is asymptomatic at this time.  Operative History:  Patient denies any cardiovascular surgeries  Risk Factors  The patient has history of HTN and Hyperlipidemia.  Clinical  Right Pressure:  134/70 mm Hg, Left Pressure:  132/70 mm Hg.    FINDINGS:    Right        Impression  PSV  EDV (cm/s)  Direction of Flow  Ratio  Dist. ICA                 71          16                      0.78  Mid. ICA                  92          26                      1.02  Prox. ICA    1 - 49%     100          25                      1.11  Dist CCA                  92          21  Mid CCA                   90          21                      0.90  Prox CCA                 100          17                      0.63  Ext Carotid              150          11                      1.67  Prox Vert                 47          14  Antegrade  Subclavian               141           0  Innominate               158           0    Left         Impression  PSV  EDV (cm/s)  Direction of Flow  Ratio  Dist. ICA                 65          21                      0.61  Mid. ICA                  80          22                      0.74  Prox. ICA    1 - 49%     108          30                       1.01  Dist CCA                  86          17  Mid CCA                  108          19                      0.87  Prox CCA                 123          19  Ext Carotid              119          17                      1.10  Prox Vert                 52          16  Antegrade  Subclavian   1 - 49%     325           0        CONCLUSION:    Impression    RIGHT:  There is <50% stenosis noted in the internal carotid artery. Plaque is  heterogenous and irregular.  Vertebral artery flow is antegrade. There is no significant subclavian artery  disease.    LEFT:  There is <50% stenosis noted in the internal carotid artery. Plaque is  heterogenous and irregular.  Vertebral artery flow is antegrade. There is >50% stenosis in the subclavian  artery.    In comparison to the study of 01/09/2022, the >50% stenosis in the left  subclavian is a new finding.    SIGNATURE:  Electronically Signed by: PETROS AMBRIZ DO, RPVI on 2023-09-05 12:34:14 PM       Diagnoses and all orders for this visit:    First degree AV block  -     POCT ECG       ======================================================          Review of Systems  ROS as noted above, otherwise 12 point review of systems was performed and is negative.     Historical Information   Past Medical History:   Diagnosis Date   • Allergy     last assessed - [id number]   • Cataracts, bilateral 9/19/2017    Had bilateral cataract surgery   • Dyspepsia     last assessed - [id number]   • Hyperlipidemia    • Hypertension    • Onychomycosis     last assessed - [id number]   • Plantar fasciitis      Past Surgical History:   Procedure Laterality Date   • COLONOSCOPY  2004    complete colonoscopy   • NEUROPLASTY / TRANSPOSITION MEDIAN NERVE AT CARPAL TUNNEL BILATERAL  2002   • ROTATOR CUFF REPAIR Bilateral     Right - Resolved - Aug 2004; Left - Resolved - Feb 2006     Social History     Substance and Sexual Activity   Alcohol Use Yes     Social History     Substance and Sexual  "Activity   Drug Use No     Social History     Tobacco Use   Smoking Status Never   • Passive exposure: Never   Smokeless Tobacco Never     Family History   Problem Relation Age of Onset   • Crohn's disease Mother        Meds/Allergies   Hospital Medications:   No current facility-administered medications for this visit.     Home Medications: (Not in a hospital admission)      No Known Allergies      Portions of the record may have been created with voice recognition software.  Occasional wrong words or \"sound a like\" substitutions may have occurred due to the inherent limitations of voice recognition software.  Read the chart carefully and recognize, using context, where substitutions have occurred.        I spent > 45 mins examining and interviewing the patient, coordinating care, communicating with other physicians, reviewing the chart, reviewing testing and writing the note.                   "

## 2024-03-22 NOTE — PROGRESS NOTES
Cardiology   Stefan HENRIK Loving 78 y.o. male MRN: [id number]   Encounter: [id number]      Assessment/Plan:    >> Mobitz type I second-degree heart block noted on ECG: Currently he is in sinus rhythm with a first-degree heart block.  EKG also shows voltage criteria for LVH.  >> Dyslipidemia  >> Carotid artery atherosclerosis  >> Hypertension    Plan:    - No evidence of heart block on Holter  - His current ECG is Mobitz 1 with narrow QRS  - Continue losartan 50 mg, continue chlorthalidone 25 mg with potassium supplementation, advised continued blood pressure monitoring at home,  - Continue Crestor 40 mg daily, consider adding zetia for target LDL < 55 mg/dl  - Will refer to EP for consideration of EP study to evaluate for infranodal disease/potential pacemaker given his age.       3/22/24: No complaints. Has been shoveling snow during recent snowstorms and has not had any significant limitations.     No episodes of syncope or presyncope.     His lyme titers were negative, his thyroid function was normal . Holter did not demonstrate any advanced heart block.     CC: Bradycardia/heart block-Mobitz type I    HPI  78 y.o. male presents for evaluation of an abnormal ECG noted in his primary care nurse practitioner clinic.  He has no complaints at this time.  His ECG in the primary care's office reportedly demonstrated Mobitz type I second-degree heart block.   Currently his ECG in the office shows sinus rhythm with a long first-degree heart block but fixed VA interval.    He has no complaints, is able to do yard work and all of his day-to-day work without any complaints or restrictions.  He has no new onset shortness of breath or fatigue.    He has does have carotid artery disease that has been followed up by vascular surgery and has had progression of carotid artery atherosclerosis.    He is hypertensive and takes chlorthalidone 25 mg (with potassium supplementation ), losartan 50 mg, and baby aspirin      The patient  "denies chest pain, shortness of breath, palpitations, orthopnea, PND, pedal edema, syncope, presyncope, diaphoresis, nausea/vomiting       The 10-year ASCVD risk score (Hayder TAYLOR, et al., 2019) is: 32.8%    Values used to calculate the score:      Age: 78 years      Sex: Male      Is Non- : No      Diabetic: No      Tobacco smoker: No      Systolic Blood Pressure: 136 mmHg      Is BP treated: Yes      HDL Cholesterol: 61 mg/dL      Total Cholesterol: 145 mg/dL            Physical exam  Objective   Vitals: Blood pressure 136/80, pulse 78, height 5' 7\" (1.702 m), weight 98.7 kg (217 lb 11.2 oz), SpO2 98%.    General:  AO x3, no acute distress  Cardiac:  S1-S2 normal,  No murmurs, rubs or gallops, JVP: normal  Lungs:  Clear to auscultation bilaterally, no wheezing or crackles.  Abdomen:  Soft, nontender, nondistended.  Extremities:  Warm, well perfused, pulses palpable, no ulcers or rashes. Edema:absent  Neuro: Grossly nonfocall        ======================================================  TREADMILL STRESS  No results found for this or any previous visit.     ----------------------------------------------------------------------------------------------  NUCLEAR STRESS TEST: No results found for this or any previous visit.    No results found for this or any previous visit.      --------------------------------------------------------------------------------  CATH:  No results found for this or any previous visit.    --------------------------------------------------------------------------------  ECHO:   No results found for this or any previous visit.    No results found for this or any previous visit.    --------------------------------------------------------------------------------  HOLTER  No results found for this or any previous visit.    --------------------------------------------------------------------------------  CAROTIDS  Results for orders placed during the hospital encounter of " 09/05/23    VAS carotid complete study    Narrative  [facility] REPORT  CLINICAL:  Indications:  Surveillance of carotid artery disease.  Patient is asymptomatic at this time.  Operative History:  Patient denies any cardiovascular surgeries  Risk Factors  The patient has history of HTN and Hyperlipidemia.  Clinical  Right Pressure:  134/70 mm Hg, Left Pressure:  132/70 mm Hg.    FINDINGS:    Right        Impression  PSV  EDV (cm/s)  Direction of Flow  Ratio  Dist. ICA                 71          16                      0.78  Mid. ICA                  92          26                      1.02  Prox. ICA    1 - 49%     100          25                      1.11  Dist CCA                  92          21  Mid CCA                   90          21                      0.90  Prox CCA                 100          17                      0.63  Ext Carotid              150          11                      1.67  Prox Vert                 47          14  Antegrade  Subclavian               141           0  Innominate               158           0    Left         Impression  PSV  EDV (cm/s)  Direction of Flow  Ratio  Dist. ICA                 65          21                      0.61  Mid. ICA                  80          22                      0.74  Prox. ICA    1 - 49%     108          30                      1.01  Dist CCA                  86          17  Mid CCA                  108          19                      0.87  Prox CCA                 123          19  Ext Carotid              119          17                      1.10  Prox Vert                 52          16  Antegrade  Subclavian   1 - 49%     325           0        CONCLUSION:    Impression    RIGHT:  There is <50% stenosis noted in the internal carotid artery. Plaque is  heterogenous and irregular.  Vertebral artery flow is antegrade. There is no significant subclavian artery  disease.    LEFT:  There is <50% stenosis noted in the internal carotid  "artery. Plaque is  heterogenous and irregular.  Vertebral artery flow is antegrade. There is >50% stenosis in the subclavian  artery.    In comparison to the study of 01/09/2022, the >50% stenosis in the left  subclavian is a new finding.    SIGNATURE:  Electronically Signed by: PETROS AMBRIZ DO, RPVI on 2023-09-05 12:34:14 PM       Diagnoses and all orders for this visit:    First degree AV block  -     POCT ECG       ======================================================          Review of Systems  ROS as noted above, otherwise 12 point review of systems was performed and is negative.     Historical Information   Past Medical History:   Diagnosis Date    Allergy     last assessed - [id number]    Cataracts, bilateral 9/19/2017    Had bilateral cataract surgery    Dyspepsia     last assessed - [id number]    Hyperlipidemia     Hypertension     Onychomycosis     last assessed - [id number]    Plantar fasciitis      Past Surgical History:   Procedure Laterality Date    COLONOSCOPY  2004    complete colonoscopy    NEUROPLASTY / TRANSPOSITION MEDIAN NERVE AT CARPAL TUNNEL BILATERAL  2002    ROTATOR CUFF REPAIR Bilateral     Right - Resolved - Aug 2004; Left - Resolved - Feb 2006     Social History     Substance and Sexual Activity   Alcohol Use Yes     Social History     Substance and Sexual Activity   Drug Use No     Social History     Tobacco Use   Smoking Status Never    Passive exposure: Never   Smokeless Tobacco Never     Family History   Problem Relation Age of Onset    Crohn's disease Mother        Meds/Allergies   Hospital Medications:   No current facility-administered medications for this visit.     Home Medications: (Not in a hospital admission)      No Known Allergies      Portions of the record may have been created with voice recognition software.  Occasional wrong words or \"sound a like\" substitutions may have occurred due to the inherent limitations of voice recognition software.  Read the chart carefully " and recognize, using context, where substitutions have occurred.        I spent > 45 mins examining and interviewing the patient, coordinating care, communicating with other physicians, reviewing the chart, reviewing testing and writing the note.

## 2024-03-25 ENCOUNTER — TELEPHONE (OUTPATIENT)
Dept: CARDIOLOGY CLINIC | Facility: CLINIC | Age: 79
End: 2024-03-25

## 2024-03-25 NOTE — TELEPHONE ENCOUNTER
----- Message from Keyon Muñoz MD sent at 3/24/2024 11:02 AM EDT -----  Hi,    I saw this patient in clinic on Friday, after he left I thought it would be a good idea to refer him to EP for his heart block, however he had already left and I could not convey this to him.  If you can let him know that I have placed a referral for EP and that he should see them in the office. TY

## 2024-04-22 ENCOUNTER — OFFICE VISIT (OUTPATIENT)
Dept: FAMILY MEDICINE CLINIC | Facility: CLINIC | Age: 79
End: 2024-04-22
Payer: MEDICARE

## 2024-04-22 VITALS
BODY MASS INDEX: 34.06 KG/M2 | WEIGHT: 217 LBS | OXYGEN SATURATION: 98 % | HEART RATE: 78 BPM | RESPIRATION RATE: 18 BRPM | DIASTOLIC BLOOD PRESSURE: 70 MMHG | SYSTOLIC BLOOD PRESSURE: 128 MMHG | HEIGHT: 67 IN | TEMPERATURE: 98 F

## 2024-04-22 DIAGNOSIS — I65.23 CAROTID STENOSIS, ASYMPTOMATIC, BILATERAL: Chronic | ICD-10-CM

## 2024-04-22 DIAGNOSIS — N18.31 STAGE 3A CHRONIC KIDNEY DISEASE (HCC): Chronic | ICD-10-CM

## 2024-04-22 DIAGNOSIS — E78.00 HYPERCHOLESTEROLEMIA: ICD-10-CM

## 2024-04-22 DIAGNOSIS — Z00.00 MEDICARE ANNUAL WELLNESS VISIT, SUBSEQUENT: Primary | ICD-10-CM

## 2024-04-22 DIAGNOSIS — I44.0 FIRST DEGREE AV BLOCK: Chronic | ICD-10-CM

## 2024-04-22 DIAGNOSIS — I10 BENIGN ESSENTIAL HYPERTENSION: Chronic | ICD-10-CM

## 2024-04-22 DIAGNOSIS — I77.1 SUBCLAVIAN ARTERY STENOSIS, LEFT (HCC): ICD-10-CM

## 2024-04-22 PROBLEM — G89.29 CHRONIC RIGHT SHOULDER PAIN: Status: RESOLVED | Noted: 2017-01-19 | Resolved: 2024-04-22

## 2024-04-22 PROBLEM — M25.511 CHRONIC RIGHT SHOULDER PAIN: Status: RESOLVED | Noted: 2017-01-19 | Resolved: 2024-04-22

## 2024-04-22 PROCEDURE — G0439 PPPS, SUBSEQ VISIT: HCPCS | Performed by: NURSE PRACTITIONER

## 2024-04-22 PROCEDURE — 99214 OFFICE O/P EST MOD 30 MIN: CPT | Performed by: NURSE PRACTITIONER

## 2024-04-22 NOTE — PATIENT INSTRUCTIONS
2018 7:39 PM    Telephone Information:   Mobile [phone]     Verified name and     Advised patient of new kidney mass. Needs CT to further characterize. Will place order and he should expect call. Recommend completing image this week and follow up with me or PCP within a week to review CT scan results. Pt expresses understanding. Medicare Preventive Visit Patient Instructions  Thank you for completing your Welcome to Medicare Visit or Medicare Annual Wellness Visit today. Your next wellness visit will be due in one year (4/23/2025).  The screening/preventive services that you may require over the next 5-10 years are detailed below. Some tests may not apply to you based off risk factors and/or age. Screening tests ordered at today's visit but not completed yet may show as past due. Also, please note that scanned in results may not display below.  Preventive Screenings:  Service Recommendations Previous Testing/Comments   Colorectal Cancer Screening  Colonoscopy    Fecal Occult Blood Test (FOBT)/Fecal Immunochemical Test (FIT)  Fecal DNA/Cologuard Test  Flexible Sigmoidoscopy Age: 45-75 years old   Colonoscopy: every 10 years (May be performed more frequently if at higher risk)  OR  FOBT/FIT: every 1 year  OR  Cologuard: every 3 years  OR  Sigmoidoscopy: every 5 years  Screening may be recommended earlier than age 45 if at higher risk for colorectal cancer. Also, an individualized decision between you and your healthcare provider will decide whether screening between the ages of 76-85 would be appropriate. Colonoscopy: 08/08/2014  FOBT/FIT: Not on file  Cologuard: Not on file  Sigmoidoscopy: Not on file          Prostate Cancer Screening Individualized decision between patient and health care provider in men between ages of 55-69   Medicare will cover every 12 months beginning on the day after your 50th birthday PSA: 3.56 ng/mL           Hepatitis C Screening Once for adults born between 1945 and 1965  More frequently in patients at high risk for Hepatitis C Hep C Antibody: 02/28/2019        Diabetes Screening 1-2 times per year if you're at risk for diabetes or have pre-diabetes Fasting glucose: 95 mg/dL (3/14/2024)  A1C: 5.1 (3/31/2022)      Cholesterol Screening Once every 5 years if you don't have a lipid disorder. May order more often  based on risk factors. Lipid panel: 03/14/2024         Other Preventive Screenings Covered by Medicare:  Abdominal Aortic Aneurysm (AAA) Screening: covered once if your at risk. You're considered to be at risk if you have a family history of AAA or a male between the age of 65-75 who smoking at least 100 cigarettes in your lifetime.  Lung Cancer Screening: covers low dose CT scan once per year if you meet all of the following conditions: (1) Age 55-77; (2) No signs or symptoms of lung cancer; (3) Current smoker or have quit smoking within the last 15 years; (4) You have a tobacco smoking history of at least 20 pack years (packs per day x number of years you smoked); (5) You get a written order from a healthcare provider.  Glaucoma Screening: covered annually if you're considered high risk: (1) You have diabetes OR (2) Family history of glaucoma OR (3)  aged 50 and older OR (4)  American aged 65 and older  Osteoporosis Screening: covered every 2 years if you meet one of the following conditions: (1) Have a vertebral abnormality; (2) On glucocorticoid therapy for more than 3 months; (3) Have primary hyperparathyroidism; (4) On osteoporosis medications and need to assess response to drug therapy.  HIV Screening: covered annually if you're between the age of 15-65. Also covered annually if you are younger than 15 and older than 65 with risk factors for HIV infection. For pregnant patients, it is covered up to 3 times per pregnancy.    Immunizations:  Immunization Recommendations   Influenza Vaccine Annual influenza vaccination during flu season is recommended for all persons aged >= 6 months who do not have contraindications   Pneumococcal Vaccine   * Pneumococcal conjugate vaccine = PCV13 (Prevnar 13), PCV15 (Vaxneuvance), PCV20 (Prevnar 20)  * Pneumococcal polysaccharide vaccine = PPSV23 (Pneumovax) Adults 19-63 yo with certain risk factors or if 65+ yo  If never received any pneumonia vaccine:  recommend Prevnar 20 (PCV20)  Give PCV20 if previously received 1 dose of PCV13 or PPSV23   Hepatitis B Vaccine 3 dose series if at intermediate or high risk (ex: diabetes, end stage renal disease, liver disease)   Respiratory syncytial virus (RSV) Vaccine - COVERED BY MEDICARE PART D  * RSVPreF3 (Arexvy) CDC recommends that adults 60 years of age and older may receive a single dose of RSV vaccine using shared clinical decision-making (SCDM)   Tetanus (Td) Vaccine - COST NOT COVERED BY MEDICARE PART B Following completion of primary series, a booster dose should be given every 10 years to maintain immunity against tetanus. Td may also be given as tetanus wound prophylaxis.   Tdap Vaccine - COST NOT COVERED BY MEDICARE PART B Recommended at least once for all adults. For pregnant patients, recommended with each pregnancy.   Shingles Vaccine (Shingrix) - COST NOT COVERED BY MEDICARE PART B  2 shot series recommended in those 19 years and older who have or will have weakened immune systems or those 50 years and older     Health Maintenance Due:      Topic Date Due   • Hepatitis C Screening  Completed   • Colorectal Cancer Screening  Discontinued     Immunizations Due:  There are no preventive care reminders to display for this patient.  Advance Directives   What are advance directives?  Advance directives are legal documents that state your wishes and plans for medical care. These plans are made ahead of time in case you lose your ability to make decisions for yourself. Advance directives can apply to any medical decision, such as the treatments you want, and if you want to donate organs.   What are the types of advance directives?  There are many types of advance directives, and each state has rules about how to use them. You may choose a combination of any of the following:  Living will:  This is a written record of the treatment you want. You can also choose which treatments you do not want, which to limit, and  which to stop at a certain time. This includes surgery, medicine, IV fluid, and tube feedings.   Durable power of  for healthcare (DPAHC):  This is a written record that states who you want to make healthcare choices for you when you are unable to make them for yourself. This person, called a proxy, is usually a family member or a friend. You may choose more than 1 proxy.  Do not resuscitate (DNR) order:  A DNR order is used in case your heart stops beating or you stop breathing. It is a request not to have certain forms of treatment, such as CPR. A DNR order may be included in other types of advance directives.  Medical directive:  This covers the care that you want if you are in a coma, near death, or unable to make decisions for yourself. You can list the treatments you want for each condition. Treatment may include pain medicine, surgery, blood transfusions, dialysis, IV or tube feedings, and a ventilator (breathing machine).  Values history:  This document has questions about your views, beliefs, and how you feel and think about life. This information can help others choose the care that you would choose.  Why are advance directives important?  An advance directive helps you control your care. Although spoken wishes may be used, it is better to have your wishes written down. Spoken wishes can be misunderstood, or not followed. Treatments may be given even if you do not want them. An advance directive may make it easier for your family to make difficult choices about your care.   Weight Management   Why it is important to manage your weight:  Being overweight increases your risk of health conditions such as heart disease, high blood pressure, type 2 diabetes, and certain types of cancer. It can also increase your risk for osteoarthritis, sleep apnea, and other respiratory problems. Aim for a slow, steady weight loss. Even a small amount of weight loss can lower your risk of health problems.  How to lose  weight safely:  A safe and healthy way to lose weight is to eat fewer calories and get regular exercise. You can lose up about 1 pound a week by decreasing the number of calories you eat by 500 calories each day.   Healthy meal plan for weight management:  A healthy meal plan includes a variety of foods, contains fewer calories, and helps you stay healthy. A healthy meal plan includes the following:  Eat whole-grain foods more often.  A healthy meal plan should contain fiber. Fiber is the part of grains, fruits, and vegetables that is not broken down by your body. Whole-grain foods are healthy and provide extra fiber in your diet. Some examples of whole-grain foods are whole-wheat breads and pastas, oatmeal, brown rice, and bulgur.  Eat a variety of vegetables every day.  Include dark, leafy greens such as spinach, kale, nevin greens, and mustard greens. Eat yellow and orange vegetables such as carrots, sweet potatoes, and winter squash.   Eat a variety of fruits every day.  Choose fresh or canned fruit (canned in its own juice or light syrup) instead of juice. Fruit juice has very little or no fiber.  Eat low-fat dairy foods.  Drink fat-free (skim) milk or 1% milk. Eat fat-free yogurt and low-fat cottage cheese. Try low-fat cheeses such as mozzarella and other reduced-fat cheeses.  Choose meat and other protein foods that are low in fat.  Choose beans or other legumes such as split peas or lentils. Choose fish, skinless poultry (chicken or turkey), or lean cuts of red meat (beef or pork). Before you cook meat or poultry, cut off any visible fat.   Use less fat and oil.  Try baking foods instead of frying them. Add less fat, such as margarine, sour cream, regular salad dressing and mayonnaise to foods. Eat fewer high-fat foods. Some examples of high-fat foods include french fries, doughnuts, ice cream, and cakes.  Eat fewer sweets.  Limit foods and drinks that are high in sugar. This includes candy, cookies,  regular soda, and sweetened drinks.  Exercise:  Exercise at least 30 minutes per day on most days of the week. Some examples of exercise include walking, biking, dancing, and swimming. You can also fit in more physical activity by taking the stairs instead of the elevator or parking farther away from stores. Ask your healthcare provider about the best exercise plan for you.      © Copyright [employer] 2018 Information is for End User's use only and may not be sold, redistributed or otherwise used for commercial purposes. All illustrations and images included in CareNotes® are the copyrighted property of [employer] or [employer]

## 2024-04-22 NOTE — ASSESSMENT & PLAN NOTE
Patient's cholesterol levels from 3/14/2024 was reviewed with him.  He continues on his omega-3 fatty acids, rosuvastatin.  Component      Latest Ref Rng 3/14/2024   Cholesterol      See Comment mg/dL 145    Triglycerides      See Comment mg/dL 100    HDL      >=40 mg/dL 61    LDL Calculated      0 - 100 mg/dL 64          The 10-year ASCVD risk score (Hayder TAYLOR, et al., 2019) is: 30%    Values used to calculate the score:      Age: 78 years      Sex: Male      Is Non- : No      Diabetic: No      Tobacco smoker: No      Systolic Blood Pressure: 128 mmHg      Is BP treated: Yes      HDL Cholesterol: 61 mg/dL      Total Cholesterol: 145 mg/dL

## 2024-04-22 NOTE — ASSESSMENT & PLAN NOTE
Patient continues to follow with vascular.  He did have duplex study performed in September 2023.  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  He will continue on aspirin and statin therapy.  The duplex will be repeated in 1 year.

## 2024-04-22 NOTE — ASSESSMENT & PLAN NOTE
I did refer the patient to cardiology at his last appointment.  At that point in time an EKG did show sinus bradycardia and Mobitz 1 second-degree AV block.  He did see cardiology and was sent to EP for additional studies.  These are scheduled in the near future.

## 2024-04-22 NOTE — ASSESSMENT & PLAN NOTE
Blood pressure in the office today is 128/70.  He continues on his losartan 50 mg daily.  He does follow with cardiology.

## 2024-04-22 NOTE — PROGRESS NOTES
Chief Complaint   Patient presents with   • Medicare Wellness Visit   • Follow-up     6 month     Health Maintenance   Topic Date Due   • COVID-19 Vaccine (11 - 2023-24 season) 05/10/2024   • Fall Risk  04/22/2025   • Depression Screening  04/22/2025   • Medicare Annual Wellness Visit (AWV)  04/22/2025   • Hepatitis C Screening  Completed   • Zoster Vaccine  Completed   • Pneumococcal Vaccine: 65+ Years  Completed   • Influenza Vaccine  Completed   • HIB Vaccine  Aged Out   • IPV Vaccine  Aged Out   • Hepatitis A Vaccine  Aged Out   • Meningococcal ACWY Vaccine  Aged Out   • HPV Vaccine  Aged Out   • Colorectal Cancer Screening  Discontinued      Assessment and Plan:     Problem List Items Addressed This Visit        Cardiovascular and Mediastinum    Benign essential hypertension (Chronic)     Blood pressure in the office today is 128/70.  He continues on his losartan 50 mg daily.  He does follow with cardiology.         Carotid stenosis, asymptomatic, bilateral (Chronic)     Patient continues to follow with vascular.  He did have duplex study performed in September 2023.  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  He will continue on aspirin and statin therapy.  The duplex will be repeated in 1 year.         First degree AV block (Chronic)     I did refer the patient to cardiology at his last appointment.  At that point in time an EKG did show sinus bradycardia and Mobitz 1 second-degree AV block.  He did see cardiology and was sent to EP for additional studies.  These are scheduled in the near future.         Subclavian artery stenosis, left (HCC)     Continue to follow with vascular.  Continue on statin and aspirin.  Venous duplex study yearly            Genitourinary    Stage 3a chronic kidney disease (HCC) (Chronic)     Lab Results   Component Value Date    EGFR 56 03/14/2024    EGFR 70 04/07/2023    EGFR 59 04/20/2022    CREATININE 1.22 03/14/2024    CREATININE 1.02 04/07/2023     CREATININE 1.18 04/20/2022   Kidney function stable.  Nephrotoxins discussed.            Other    Hypercholesterolemia     Patient's cholesterol levels from 3/14/2024 was reviewed with him.  He continues on his omega-3 fatty acids, rosuvastatin.  Component      Latest Ref Rng 3/14/2024   Cholesterol      See Comment mg/dL 145    Triglycerides      See Comment mg/dL 100    HDL      >=40 mg/dL 61    LDL Calculated      0 - 100 mg/dL 64          The 10-year ASCVD risk score (Hayder TAYLOR, et al., 2019) is: 30%    Values used to calculate the score:      Age: 78 years      Sex: Male      Is Non- : No      Diabetic: No      Tobacco smoker: No      Systolic Blood Pressure: 128 mmHg      Is BP treated: Yes      HDL Cholesterol: 61 mg/dL      Total Cholesterol: 145 mg/dL            Other Visit Diagnoses     Medicare annual wellness visit, subsequent    -  Primary            Depression Screening and Follow-up Plan: Patient was screened for depression during today's encounter. They screened negative with a PHQ-2 score of 0.      Preventive health issues were discussed with patient, and age appropriate screening tests were ordered as noted in patient's After Visit Summary.  Personalized health advice and appropriate referrals for health education or preventive services given if needed, as noted in patient's After Visit Summary.     History of Present Illness:     Patient presents for a Medicare Wellness Visit    Stefan presents to the office today for Medicare wellness visit.  Recent blood work from 3/4/2024 was reviewed with him.  Overall he feels well.  He continues to follow with cardiology, vascular and podiatry.  He has no new concerns today.  I will see him back in the office in 6 months.       Patient Care Team:  VEGA Reyes as PCP - General (Internal Medicine)  Joaquín Delgado DPM (Podiatry)     Review of Systems:     Review of Systems   Constitutional: Negative.  Negative for fatigue.   HENT:  Negative.  Negative for congestion, postnasal drip, rhinorrhea and trouble swallowing.    Eyes: Negative.  Negative for visual disturbance.   Respiratory: Negative.  Negative for choking and shortness of breath.    Cardiovascular: Negative.  Negative for chest pain.   Gastrointestinal: Negative.    Endocrine: Negative.    Genitourinary: Negative.    Musculoskeletal: Negative.  Negative for arthralgias, back pain, myalgias and neck pain.   Skin: Negative.    Neurological:  Negative for dizziness and headaches.   Psychiatric/Behavioral: Negative.          Problem List:     Patient Active Problem List   Diagnosis   • Benign essential hypertension   • Carotid stenosis, asymptomatic, bilateral   • First degree AV block   • Hypercholesterolemia   • BMI 33.0-33.9,adult   • Seborrheic keratoses   • Osteoarthritis   • Mixed conductive and sensorineural hearing loss of both ears   • Hypokalemia   • Dry eye syndrome of both eyes   • Stage 3a chronic kidney disease (HCC)   • Subclavian artery stenosis, left (HCC)      Past Medical and Surgical History:     Past Medical History:   Diagnosis Date   • Allergy     last assessed - [id number]   • Arthritis    • Cataracts, bilateral 09/19/2017    Had bilateral cataract surgery   • Chronic right shoulder pain 01/19/2017   • Dyspepsia     last assessed - [id number]   • GERD (gastroesophageal reflux disease)    • Heart murmur    • Hyperlipidemia    • Hypertension    • Onychomycosis     last assessed - [id number]   • Plantar fasciitis      Past Surgical History:   Procedure Laterality Date   • COLONOSCOPY  2004    complete colonoscopy   • NEUROPLASTY / TRANSPOSITION MEDIAN NERVE AT CARPAL TUNNEL BILATERAL  2002   • ROTATOR CUFF REPAIR Bilateral     Right - Resolved - Aug 2004; Left - Resolved - Feb 2006      Family History:     Family History   Problem Relation Age of Onset   • Crohn's disease Mother       Social History:     Social History     Socioeconomic History   • Marital status:  /Civil Union     Spouse name: None   • Number of children: None   • Years of education: None   • Highest education level: None   Occupational History   • None   Tobacco Use   • Smoking status: Never     Passive exposure: Never   • Smokeless tobacco: Never   Vaping Use   • Vaping status: Never Used   Substance and Sexual Activity   • Alcohol use: Yes   • Drug use: No   • Sexual activity: Not Currently     Birth control/protection: Abstinence   Other Topics Concern   • None   Social History Narrative   • None     Social Determinants of Health     Financial Resource Strain: Low Risk  (4/18/2023)    Overall Financial Resource Strain (CARDIA)    • Difficulty of Paying Living Expenses: Not hard at all   Food Insecurity: No Food Insecurity (4/22/2024)    Hunger Vital Sign    • Worried About Running Out of Food in the Last Year: Never true    • Ran Out of Food in the Last Year: Never true   Transportation Needs: No Transportation Needs (4/22/2024)    PRAPARE - Transportation    • Lack of Transportation (Medical): No    • Lack of Transportation (Non-Medical): No   Physical Activity: Not on file   Stress: Not on file   Social Connections: Not on file   Intimate Partner Violence: Not on file   Housing Stability: Unknown (4/22/2024)    Housing Stability Vital Sign    • Unable to Pay for Housing in the Last Year: No    • Number of Places Lived in the Last Year: Not on file    • Unstable Housing in the Last Year: No      Medications and Allergies:     Current Outpatient Medications   Medication Sig Dispense Refill   • Ascorbic Acid (VITAMIN C) 100 MG tablet Take 100 mg by mouth daily     • aspirin 81 MG tablet Take 1 tablet by mouth daily     • chlorthalidone 25 mg tablet TAKE 1 TABLET BY MOUTH  DAILY 90 tablet 3   • cholecalciferol (VITAMIN D3) 1,000 units tablet Take 500 Units by mouth daily     • clotrimazole-betamethasone (LOTRISONE) 1-0.05 % cream Apply topically 2 (two) times a day as needed (rash) 30 g 2   • Ginkgo  Biloba 40 MG TABS Take by mouth in the morning     • glucosamine 500 MG CAPS capsule Take 500 mg by mouth     • losartan (COZAAR) 50 mg tablet TAKE 1 TABLET BY MOUTH DAILY 90 tablet 3   • Multiple Vitamins-Minerals (MULTIVITAMIN WITH MINERALS) tablet Take 1 tablet by mouth daily     • Omega-3 Fatty Acids (FISH OIL) 1,000 mg Take 1,000 mg by mouth daily     • potassium chloride (K-DUR,KLOR-CON) 20 mEq tablet TAKE 1 TABLET BY MOUTH  TWICE DAILY (Patient taking differently: 20 mEq daily) 180 tablet 3   • RESTASIS 0.05 % ophthalmic emulsion INSTILL 1 DROP BY OPHTHALMIC ROUTE 2 TIMES EVERY DAY INTO BOTH EYES AS NEEDED  4   • rosuvastatin (CRESTOR) 40 MG tablet Take 1 tablet (40 mg total) by mouth daily 90 tablet 3     No current facility-administered medications for this visit.     No Known Allergies   Immunizations:     Immunization History   Administered Date(s) Administered   • COVID-19 PFIZER VACCINE 0.3 ML IM 01/16/2021, 02/05/2021, 10/02/2021, 10/28/2022, 09/05/2023   • COVID-19 Pfizer Vac BIVALENT Thee-sucrose 12 Yr+ IM 10/28/2022, 04/01/2023, 09/05/2023   • COVID-19 Pfizer mRNA vacc PF thee-sucrose 12 yr and older (Comirnaty) 03/15/2024   • COVID-19 Pfizer vac (Thee-sucrose, gray cap) 12 yr+ IM 05/16/2022   • INFLUENZA 09/09/2022, 09/05/2023   • Influenza Split High Dose Preservative Free IM 10/26/2015, 09/09/2016, 09/19/2017   • Influenza, high dose seasonal 0.7 mL 08/30/2018, 09/05/2019, 09/17/2020, 10/18/2021, 09/09/2022   • Influenza, seasonal, injectable 10/01/1999   • Pneumococcal Conjugate 13-Valent 07/16/2015   • Pneumococcal Polysaccharide PPV23 1945, 11/02/2011   • Respiratory Syncytial Virus Vaccine (Recombinant, Adjuvanted) 10/09/2023   • Td (adult), adsorbed 05/25/2000, 07/01/2005   • Tdap 04/18/2017   • Zoster 1945   • Zoster Vaccine Recombinant 04/30/2019, 09/12/2019      Health Maintenance:         Topic Date Due   • Hepatitis C Screening  Completed   • Colorectal Cancer Screening   Discontinued     There are no preventive care reminders to display for this patient.   Medicare Screening Tests and Risk Assessments:     Stefan is here for his Subsequent Wellness visit. Last Medicare Wellness visit information reviewed, patient interviewed, no change since last AWV.     Health Risk Assessment:   Patient rates overall health as very good. Patient feels that their physical health rating is same. Patient is very satisfied with their life. Eyesight was rated as same. Hearing was rated as same. Patient feels that their emotional and mental health rating is same. Patients states they are never, rarely angry. Patient states they are sometimes unusually tired/fatigued. Pain experienced in the last 7 days has been some. Patient's pain rating has been 2/10. Patient states that he has experienced no weight loss or gain in last 6 months.     Depression Screening:   PHQ-2 Score: 0      Fall Risk Screening:   In the past year, patient has experienced: no history of falling in past year      Home Safety:  Patient does not have trouble with stairs inside or outside of their home. Patient has working smoke alarms and has working carbon monoxide detector. Home safety hazards include: none.     Nutrition:   Current diet is Regular and Limited junk food.     Medications:   Patient is currently taking over-the-counter supplements. OTC medications include: see medication list. Patient is able to manage medications.     Activities of Daily Living (ADLs)/Instrumental Activities of Daily Living (IADLs):   Walk and transfer into and out of bed and chair?: Yes  Dress and groom yourself?: Yes    Bathe or shower yourself?: Yes    Feed yourself? Yes  Do your laundry/housekeeping?: Yes  Manage your money, pay your bills and track your expenses?: Yes  Make your own meals?: Yes    Do your own shopping?: Yes    Previous Hospitalizations:   Any hospitalizations or ED visits within the last 12 months?: No      Advance Care Planning:  "  Living will: Yes    Durable POA for healthcare: Yes    Advanced directive: Yes    Five wishes given: No      Cognitive Screening:   Provider or family/friend/caregiver concerned regarding cognition?: No    PREVENTIVE SCREENINGS      Cardiovascular Screening:    General: History Lipid Disorder and Screening Current      Diabetes Screening:     General: Screening Current      Colorectal Cancer Screening:     General: Screening Current      Prostate Cancer Screening:    General: Screening Current      Osteoporosis Screening:    General: Screening Not Indicated      Abdominal Aortic Aneurysm (AAA) Screening:        General: Screening Not Indicated      Lung Cancer Screening:     General: Screening Not Indicated      Hepatitis C Screening:    General: Screening Current    Screening, Brief Intervention, and Referral to Treatment (SBIRT)    Screening  Typical number of drinks in a day: 0  Typical number of drinks in a week: 0  Interpretation: Low risk drinking behavior.    AUDIT-C Screenin) How often did you have a drink containing alcohol in the past year? monthly or less  2) How many drinks did you have on a typical day when you were drinking in the past year? 1 to 2  3) How often did you have 6 or more drinks on one occasion in the past year? never    AUDIT-C Score: 1  Interpretation: Score 0-3 (male): Negative screen for alcohol misuse    Single Item Drug Screening:  How often have you used an illegal drug (including marijuana) or a prescription medication for non-medical reasons in the past year? never    Single Item Drug Screen Score: 0  Interpretation: Negative screen for possible drug use disorder    No results found.     Physical Exam:     /70   Pulse 78   Temp 98 °F (36.7 °C) (Tympanic)   Resp 18   Ht 5' 7\" (1.702 m)   Wt 98.4 kg (217 lb)   SpO2 98%   BMI 33.99 kg/m²     Physical Exam  Vitals and nursing note reviewed.   Constitutional:       Appearance: Normal appearance. He is well-developed " and normal weight.   HENT:      Head: Normocephalic and atraumatic.      Right Ear: Tympanic membrane, ear canal and external ear normal. There is no impacted cerumen.      Left Ear: Tympanic membrane, ear canal and external ear normal. There is no impacted cerumen.      Nose: Nose normal.      Mouth/Throat:      Mouth: Mucous membranes are moist.      Pharynx: Oropharynx is clear.   Eyes:      Conjunctiva/sclera: Conjunctivae normal.   Cardiovascular:      Rate and Rhythm: Normal rate and regular rhythm.      Pulses: Normal pulses.      Heart sounds: No murmur heard.  Pulmonary:      Effort: Pulmonary effort is normal. No respiratory distress.      Breath sounds: Normal breath sounds.   Abdominal:      General: Bowel sounds are normal. There is no distension.      Palpations: Abdomen is soft. There is no mass.      Tenderness: There is no abdominal tenderness. There is no guarding or rebound.      Hernia: No hernia is present.   Musculoskeletal:         General: Normal range of motion.      Cervical back: Normal range of motion and neck supple.   Skin:     General: Skin is warm and dry.   Neurological:      General: No focal deficit present.      Mental Status: He is alert and oriented to person, place, and time. Mental status is at baseline.   Psychiatric:         Mood and Affect: Mood normal.         Behavior: Behavior normal.         Thought Content: Thought content normal.         Judgment: Judgment normal.          VEGA Sauer

## 2024-04-22 NOTE — ASSESSMENT & PLAN NOTE
Lab Results   Component Value Date    EGFR 56 03/14/2024    EGFR 70 04/07/2023    EGFR 59 04/20/2022    CREATININE 1.22 03/14/2024    CREATININE 1.02 04/07/2023    CREATININE 1.18 04/20/2022   Kidney function stable.  Nephrotoxins discussed.

## 2024-05-04 DIAGNOSIS — I10 ESSENTIAL HYPERTENSION: ICD-10-CM

## 2024-05-06 RX ORDER — CHLORTHALIDONE 25 MG/1
TABLET ORAL
Qty: 90 TABLET | Refills: 1 | Status: SHIPPED | OUTPATIENT
Start: 2024-05-06

## 2024-06-04 ENCOUNTER — CONSULT (OUTPATIENT)
Dept: CARDIOLOGY CLINIC | Facility: CLINIC | Age: 79
End: 2024-06-04
Payer: MEDICARE

## 2024-06-04 VITALS
BODY MASS INDEX: 34.26 KG/M2 | SYSTOLIC BLOOD PRESSURE: 146 MMHG | HEART RATE: 79 BPM | HEIGHT: 67 IN | DIASTOLIC BLOOD PRESSURE: 60 MMHG | WEIGHT: 218.3 LBS

## 2024-06-04 DIAGNOSIS — I44.1 HEART BLOCK AV SECOND DEGREE: Primary | ICD-10-CM

## 2024-06-04 PROCEDURE — 99215 OFFICE O/P EST HI 40 MIN: CPT | Performed by: INTERNAL MEDICINE

## 2024-06-04 PROCEDURE — 93000 ELECTROCARDIOGRAM COMPLETE: CPT | Performed by: INTERNAL MEDICINE

## 2024-06-04 NOTE — PROGRESS NOTES
EPS Consultation/New Patient Evaluation - Stefan Loving 78 y.o. male MRN: [id number]       Referring:Dr. Muñoz    CC/HPI:   It was a pleasure to see Stefan Loving in our arrhythmia clinic at [facility]. As you know he is a 78 y.o. man with Mobitz Type 1, HLD, carotid artery atherosclerosis, HTN who presents to discuss management of heart block.     Patient was seen in our general cardiology clinic for an abnormal ECG seen at primary care nurse clinic. He had ECG showing Mobitz Type 1 second degree heart block. He had Mobitz Type 1 block on ECG. His other ECG has shown 1st dgree AV delay. Patient has been able to do yard work and does not feel symptoms with it. He had negative lyme titers. Holter monitor in Dec 2023 showed sinus bradycardia with PAC, PVC. He had 2.8 second pauses. He had baseline 1st degree AV delay and second degree mobitz Type 1 block through night.     He presents with his wife who also provides part of the history. He has not been having any symptoms including dizziness, orthopnea, PND or syncope. He has been having lightheadedness when standing up. He denies any syncopal episodes.     Past Medical History:  Past Medical History:   Diagnosis Date    Allergy     last assessed - [id number]    Arthritis     Cataracts, bilateral 09/19/2017    Had bilateral cataract surgery    Chronic right shoulder pain 01/19/2017    Dyspepsia     last assessed - [id number]    GERD (gastroesophageal reflux disease)     Heart murmur     Hyperlipidemia     Hypertension     Onychomycosis     last assessed - [id number]    Plantar fasciitis        Medications:      Current Outpatient Medications:     Ascorbic Acid (VITAMIN C) 100 MG tablet, Take 100 mg by mouth daily, Disp: , Rfl:     aspirin 81 MG tablet, Take 1 tablet by mouth daily, Disp: , Rfl:     chlorthalidone 25 mg tablet, TAKE 1 TABLET BY MOUTH DAILY, Disp: 90 tablet, Rfl: 1    cholecalciferol (VITAMIN D3) 1,000 units tablet,  Take 500 Units by mouth daily, Disp: , Rfl:     clotrimazole-betamethasone (LOTRISONE) 1-0.05 % cream, Apply topically 2 (two) times a day as needed (rash), Disp: 30 g, Rfl: 2    Ginkgo Biloba 40 MG TABS, Take by mouth in the morning, Disp: , Rfl:     glucosamine 500 MG CAPS capsule, Take 500 mg by mouth, Disp: , Rfl:     losartan (COZAAR) 50 mg tablet, TAKE 1 TABLET BY MOUTH DAILY, Disp: 90 tablet, Rfl: 3    Multiple Vitamins-Minerals (MULTIVITAMIN WITH MINERALS) tablet, Take 1 tablet by mouth daily, Disp: , Rfl:     Omega-3 Fatty Acids (FISH OIL) 1,000 mg, Take 1,000 mg by mouth daily, Disp: , Rfl:     potassium chloride (K-DUR,KLOR-CON) 20 mEq tablet, TAKE 1 TABLET BY MOUTH  TWICE DAILY (Patient taking differently: 20 mEq daily), Disp: 180 tablet, Rfl: 3    RESTASIS 0.05 % ophthalmic emulsion, INSTILL 1 DROP BY OPHTHALMIC ROUTE 2 TIMES EVERY DAY INTO BOTH EYES AS NEEDED, Disp: , Rfl: 4    rosuvastatin (CRESTOR) 40 MG tablet, Take 1 tablet (40 mg total) by mouth daily, Disp: 90 tablet, Rfl: 3     Family History   Problem Relation Age of Onset    Crohn's disease Mother      Social History     Socioeconomic History    Marital status: /Civil Union     Spouse name: Not on file    Number of children: Not on file    Years of education: Not on file    Highest education level: Not on file   Occupational History    Not on file   Tobacco Use    Smoking status: Never     Passive exposure: Never    Smokeless tobacco: Never   Vaping Use    Vaping status: Never Used   Substance and Sexual Activity    Alcohol use: Yes    Drug use: No    Sexual activity: Not Currently     Birth control/protection: Abstinence   Other Topics Concern    Not on file   Social History Narrative    Not on file     Social Determinants of Health     Financial Resource Strain: Low Risk  (4/18/2023)    Overall Financial Resource Strain (CARDIA)     Difficulty of Paying Living Expenses: Not hard at all   Food Insecurity: No Food Insecurity (4/22/2024)  "   Hunger Vital Sign     Worried About Running Out of Food in the Last Year: Never true     Ran Out of Food in the Last Year: Never true   Transportation Needs: No Transportation Needs (4/22/2024)    PRAPARE - Transportation     Lack of Transportation (Medical): No     Lack of Transportation (Non-Medical): No   Physical Activity: Not on file   Stress: Not on file   Social Connections: Not on file   Intimate Partner Violence: Not on file   Housing Stability: Unknown (4/22/2024)    Housing Stability Vital Sign     Unable to Pay for Housing in the Last Year: No     Number of Places Lived in the Last Year: Not on file     Unstable Housing in the Last Year: No     Social History     Tobacco Use   Smoking Status Never    Passive exposure: Never   Smokeless Tobacco Never     Social History     Substance and Sexual Activity   Alcohol Use Yes       Review of Systems   Constitutional: Negative for chills and fever.   HENT: Negative.     Eyes:  Negative for blurred vision and double vision.   Cardiovascular:  Negative for chest pain, dyspnea on exertion, leg swelling, near-syncope, orthopnea, palpitations, paroxysmal nocturnal dyspnea and syncope.   Respiratory:  Negative for cough and sputum production.    Endocrine: Negative.    Skin: Negative.  Negative for rash.   Musculoskeletal: Negative.  Negative for arthritis and joint pain.   Gastrointestinal:  Negative for abdominal pain, nausea and vomiting.   Genitourinary: Negative.    Neurological: Negative.  Negative for dizziness and light-headedness.   Psychiatric/Behavioral: Negative.  The patient is not nervous/anxious.         Objective:     Vitals: Blood pressure 146/60, pulse 79, height 5' 7\" (1.702 m), weight 99 kg (218 lb 4.8 oz)., Body mass index is 34.19 kg/m².,        Physical Exam:    GEN: Stefan Loving appears well, alert and oriented x 3, pleasant and cooperative   HEENT: pupils equal, round, and reactive to light; extraocular muscles intact  NECK: supple, no " carotid bruits   HEART: regular rhythm, normal S1 and S2, no murmurs, clicks, gallops or rubs   LUNGS: clear to auscultation bilaterally; no wheezes, rales, or rhonchi   ABDOMEN: normal bowel sounds, soft, no tenderness, no distention  EXTREMITIES: peripheral pulses normal; no clubbing, cyanosis, or edema  NEURO: no focal findings   SKIN: normal without suspicious lesions on exposed skin      Labs & Results:  Below is the patient's most recent value for Albumin, ALT, AST, BUN, Calcium, Chloride, Cholesterol, CO2, Creatinine, GFR, Glucose, HDL, Hematocrit, Hemoglobin, Hemoglobin A1C, LDL, Magnesium, Phosphorus, Platelets, Potassium, PSA, Sodium, Triglycerides, and WBC.   Lab Results   Component Value Date    ALT 20 03/14/2024    AST 22 03/14/2024    BUN 23 03/14/2024    CALCIUM 9.7 03/14/2024     03/14/2024    CHOL 147 07/14/2015    CO2 28 03/14/2024    CREATININE 1.22 03/14/2024    HDL 61 03/14/2024    HCT 40.7 03/14/2024    HGB 14.1 03/14/2024    HGBA1C 5.1 03/31/2022    MG 2.3 03/17/2022     03/14/2024    K 3.7 03/14/2024    PSA 3.56 03/14/2024     08/17/2015    TRIG 100 03/14/2024    WBC 6.42 03/14/2024     Note: for a comprehensive list of the patient's lab results, access the Results Review activity.          Cardiac testing:     I personally reviewed the ECG performed in the clinic on 06/04/24. It reveals Sinsu rhythm with 1st degree AV delay 368 ms.     Echocardiograms:  No results found for this or any previous visit.    No results found for this or any previous visit.      Catheterizations:   No results found for this or any previous visit.      Stress Tests:  No results found for this or any previous visit.      Holter monitor -   No results found for this or any previous visit.    No results found for this or any previous visit.        ASSESSMENT/PLAN:  AV node disease  Patient has prolonged AV delay on ECG  Holter monitor has shown Mobitz Type 1 block  Not having any symptoms or  syncope  Would not meet criteria for pacemaker unless he has symptoms  Discussed loop monitor to detect higher degree heart block  He is agreeable  We will have office reach out to set-up the loop monitor.   HTN  Mildly hypertensive in the office  Maintained on losartan and chlorthalidone   HLD  Maintained on Crestor 40 mg daily

## 2024-06-11 ENCOUNTER — PREP FOR PROCEDURE (OUTPATIENT)
Dept: CARDIOLOGY CLINIC | Facility: CLINIC | Age: 79
End: 2024-06-11

## 2024-06-11 ENCOUNTER — TELEPHONE (OUTPATIENT)
Dept: CARDIOLOGY CLINIC | Facility: CLINIC | Age: 79
End: 2024-06-11

## 2024-06-11 DIAGNOSIS — R00.1 BRADYCARDIA: Primary | ICD-10-CM

## 2024-06-11 NOTE — TELEPHONE ENCOUNTER
"I called patient and wife Adrienne answered call as patient is driving.     Adrienne states she will have patient call once they get home to schedule LOOP Recorder Implant.     Thanks,  Claudia \"Renetta\" Kam     "

## 2024-06-11 NOTE — TELEPHONE ENCOUNTER
----- Message from Juwan Corcoran MD sent at 6/4/2024  6:13 PM EDT -----  Regarding: loop monitor    Please set -up loop monitor - company - [employer]    Indication bradycardia     Thanks.

## 2024-06-11 NOTE — LETTER
"   CARDIAC LOOP RECORDER IMPLANT/EXPLANT INSTRUCTIONS   2024    Stefan Loving          : 1945        MRN: [id number]   [address]  [facility] [address]    Procedure Name: CARDIAC LOOP RECORDER IMPLANT    Procedure date: 24    Location: [facility]  Address: [address] [place] [address], [place] [address]    You may have breakfast the morning of the procedure.    Please take your morning medication as prescribed.    The hospital will contact you one day prior to your procedure date between 4PM - 6PM to give you the time and place to report. If you do not hear from [facility] by 6PM the evening prior to your procedure, please call  [phone] ([place]) or [phone] ([place]).     Please notify us if you have been admitted to the hospital within the past 30 days.    You may drive yourself to and from the hospital for the procedure.    Make a list of your medications, including doses, to bring to the hospital. Please nan which medications you took the morning of the procedure.    Leave all valuables at home.    If you have any further questions, I can be reached at [phone].    Medication holds:   N/A    Blood work to be done on:  N/A      Thank you,   Claudia \"Renetta\" Kam  Surgery Coordinator  [facility] Cardiology   Teams: [phone]    "

## 2024-06-11 NOTE — TELEPHONE ENCOUNTER
"Patient is scheduled for LOOP Recorder Implant on 7/24/24 at [facility] with .     Patient aware of all general instructions.    Instructions sent to patient through mail.      Medication holds:   N/A    Blood work to be done on:  N/A  (Patient did CMP / CBC on 3/14/24)        Thank you,  Claudia \"Renetta\" Kam      "

## 2024-07-10 ENCOUNTER — OFFICE VISIT (OUTPATIENT)
Dept: CARDIOLOGY CLINIC | Facility: CLINIC | Age: 79
End: 2024-07-10
Payer: MEDICARE

## 2024-07-10 VITALS
DIASTOLIC BLOOD PRESSURE: 64 MMHG | SYSTOLIC BLOOD PRESSURE: 130 MMHG | HEIGHT: 67 IN | BODY MASS INDEX: 34 KG/M2 | HEART RATE: 77 BPM | OXYGEN SATURATION: 98 % | WEIGHT: 216.6 LBS

## 2024-07-10 DIAGNOSIS — I65.23 BILATERAL CAROTID ARTERY STENOSIS: Primary | ICD-10-CM

## 2024-07-10 PROCEDURE — 99214 OFFICE O/P EST MOD 30 MIN: CPT | Performed by: INTERNAL MEDICINE

## 2024-07-10 NOTE — LETTER
July 10, 2024     VEGA Reyes  [address] [facility] [address]    Patient: Stefan Loving   YOB: 1945   Date of Visit: 7/10/2024       Dear MsJovi Sanchez:    Thank you for referring Stefan Loving to me for evaluation. Below are my notes for this consultation.    If you have questions, please do not hesitate to call me. I look forward to following your patient along with you.         Sincerely,        Keyon Muñoz MD        CC: No Recipients    Keyon Muñoz MD  7/10/2024  2:37 PM  Sign when Signing Visit  Cardiology   Stefan Loving 78 y.o. male MRN: [id number]   Encounter: [id number]      Assessment/Plan:    >> Mobitz type I second-degree heart block noted on ECG: Currently he is in sinus rhythm with a first-degree heart block.  EKG also shows voltage criteria for LVH.  >> Dyslipidemia  >> Carotid artery atherosclerosis  >> Hypertension    Plan:    - No evidence of heart block on Holter  - His current ECG is Mobitz 1 with narrow QRS  - Continue losartan 50 mg, continue chlorthalidone 25 mg with potassium supplementation, advised continued blood pressure monitoring at home,  - Continue Crestor 40 mg daily, consider adding zetia for target LDL < 55 mg/dl, repeat prior to next visit.   - Following with EP- loop scheduled        7/10/24: No complaints. LDL in the 60s. Has an appointment scheduled for loop recorder soon. Denies chest pain, shortness of breath, palpitations, orthopnea, PND, pedal edema, syncope, presyncope, diaphoresis, nausea/vomiting.        3/22/24: No complaints. Has been shoveling snow during recent snowstorms and has not had any significant limitations.     No episodes of syncope or presyncope.     His lyme titers were negative, his thyroid function was normal . Holter did not demonstrate any advanced heart block.     CC: Bradycardia/heart block-Mobitz type I    HPI  78 y.o. male presents for evaluation of an abnormal ECG noted in his primary care nurse  "practitioner clinic.  He has no complaints at this time.  His ECG in the primary care's office reportedly demonstrated Mobitz type I second-degree heart block.   Currently his ECG in the office shows sinus rhythm with a long first-degree heart block but fixed OR interval.    He has no complaints, is able to do yard work and all of his day-to-day work without any complaints or restrictions.  He has no new onset shortness of breath or fatigue.    He has does have carotid artery disease that has been followed up by vascular surgery and has had progression of carotid artery atherosclerosis.    He is hypertensive and takes chlorthalidone 25 mg (with potassium supplementation ), losartan 50 mg, and baby aspirin      The patient denies chest pain, shortness of breath, palpitations, orthopnea, PND, pedal edema, syncope, presyncope, diaphoresis, nausea/vomiting       The 10-year ASCVD risk score (Hayder TAYLOR, et al., 2019) is: 30.7%    Values used to calculate the score:      Age: 78 years      Sex: Male      Is Non- : No      Diabetic: No      Tobacco smoker: No      Systolic Blood Pressure: 130 mmHg      Is BP treated: Yes      HDL Cholesterol: 61 mg/dL      Total Cholesterol: 145 mg/dL            Physical exam  Objective  Vitals: Blood pressure 130/64, pulse 77, height 5' 7\" (1.702 m), weight 98.2 kg (216 lb 9.6 oz), SpO2 98%.    General:  AO x3, no acute distress  Cardiac:  S1-S2 normal,  No murmurs, rubs or gallops, JVP: normal  Lungs:  Clear to auscultation bilaterally, no wheezing or crackles.  Abdomen:  Soft, nontender, nondistended.  Extremities:  Warm, well perfused, pulses palpable, no ulcers or rashes. Edema:absent  Neuro: Grossly nonfocall        ======================================================  TREADMILL STRESS  No results found for this or any previous visit.     ----------------------------------------------------------------------------------------------  NUCLEAR STRESS TEST: No " results found for this or any previous visit.    No results found for this or any previous visit.      --------------------------------------------------------------------------------  CATH:  No results found for this or any previous visit.    --------------------------------------------------------------------------------  ECHO:   No results found for this or any previous visit.    No results found for this or any previous visit.    --------------------------------------------------------------------------------  HOLTER  No results found for this or any previous visit.    --------------------------------------------------------------------------------  CAROTIDS  Results for orders placed during the hospital encounter of 09/05/23    VAS carotid complete study    Narrative  [facility] REPORT  CLINICAL:  Indications:  Surveillance of carotid artery disease.  Patient is asymptomatic at this time.  Operative History:  Patient denies any cardiovascular surgeries  Risk Factors  The patient has history of HTN and Hyperlipidemia.  Clinical  Right Pressure:  134/70 mm Hg, Left Pressure:  132/70 mm Hg.    FINDINGS:    Right        Impression  PSV  EDV (cm/s)  Direction of Flow  Ratio  Dist. ICA                 71          16                      0.78  Mid. ICA                  92          26                      1.02  Prox. ICA    1 - 49%     100          25                      1.11  Dist CCA                  92          21  Mid CCA                   90          21                      0.90  Prox CCA                 100          17                      0.63  Ext Carotid              150          11                      1.67  Prox Vert                 47          14  Antegrade  Subclavian               141           0  Innominate               158           0    Left         Impression  PSV  EDV (cm/s)  Direction of Flow  Ratio  Dist. ICA                 65          21                      0.61  Mid. ICA                   80          22                      0.74  Prox. ICA    1 - 49%     108          30                      1.01  Dist CCA                  86          17  Mid CCA                  108          19                      0.87  Prox CCA                 123          19  Ext Carotid              119          17                      1.10  Prox Vert                 52          16  Antegrade  Subclavian   1 - 49%     325           0        CONCLUSION:    Impression    RIGHT:  There is <50% stenosis noted in the internal carotid artery. Plaque is  heterogenous and irregular.  Vertebral artery flow is antegrade. There is no significant subclavian artery  disease.    LEFT:  There is <50% stenosis noted in the internal carotid artery. Plaque is  heterogenous and irregular.  Vertebral artery flow is antegrade. There is >50% stenosis in the subclavian  artery.    In comparison to the study of 01/09/2022, the >50% stenosis in the left  subclavian is a new finding.    SIGNATURE:  Electronically Signed by: PETROS AMBRIZ DO, RPVI on 2023-09-05 12:34:14 PM       Diagnoses and all orders for this visit:    Bilateral carotid artery stenosis  -     Lipid Panel With Direct LDL; Future       ======================================================          Review of Systems  ROS as noted above, otherwise 12 point review of systems was performed and is negative.     Historical Information  Past Medical History:   Diagnosis Date   • Allergy     last assessed - 13Jul2012   • Arthritis    • Cataracts, bilateral 09/19/2017    Had bilateral cataract surgery   • Chronic right shoulder pain 01/19/2017   • Dyspepsia     last assessed - [id number]   • GERD (gastroesophageal reflux disease)    • Heart murmur    • Hyperlipidemia    • Hypertension    • Onychomycosis     last assessed - [id number]   • Plantar fasciitis      Past Surgical History:   Procedure Laterality Date   • COLONOSCOPY  2004    complete colonoscopy   • NEUROPLASTY / TRANSPOSITION  "MEDIAN NERVE AT CARPAL TUNNEL BILATERAL  2002   • ROTATOR CUFF REPAIR Bilateral     Right - Resolved - Aug 2004; Left - Resolved - Feb 2006     Social History     Substance and Sexual Activity   Alcohol Use Yes     Social History     Substance and Sexual Activity   Drug Use No     Social History     Tobacco Use   Smoking Status Never   • Passive exposure: Never   Smokeless Tobacco Never     Family History   Problem Relation Age of Onset   • Crohn's disease Mother        Meds/Allergies  Hospital Medications:   No current facility-administered medications for this visit.     Home Medications: Not in a hospital admission.      No Known Allergies      Portions of the record may have been created with voice recognition software.  Occasional wrong words or \"sound a like\" substitutions may have occurred due to the inherent limitations of voice recognition software.  Read the chart carefully and recognize, using context, where substitutions have occurred.        I spent > 35 mins examining and interviewing the patient, coordinating care, communicating with other physicians, reviewing the chart, reviewing testing and writing the note.                   "

## 2024-07-10 NOTE — PROGRESS NOTES
Cardiology   Stefan HENRIK Loving 78 y.o. male MRN: [id number]   Encounter: [id number]      Assessment/Plan:    >> Mobitz type I second-degree heart block noted on ECG: Currently he is in sinus rhythm with a first-degree heart block.  EKG also shows voltage criteria for LVH.  >> Dyslipidemia  >> Carotid artery atherosclerosis  >> Hypertension    Plan:    - No evidence of heart block on Holter  - His current ECG is Mobitz 1 with narrow QRS  - Continue losartan 50 mg, continue chlorthalidone 25 mg with potassium supplementation, advised continued blood pressure monitoring at home,  - Continue Crestor 40 mg daily, consider adding zetia for target LDL < 55 mg/dl, repeat prior to next visit.   - Following with EP- loop scheduled        7/10/24: No complaints. LDL in the 60s. Has an appointment scheduled for loop recorder soon. Denies chest pain, shortness of breath, palpitations, orthopnea, PND, pedal edema, syncope, presyncope, diaphoresis, nausea/vomiting.        3/22/24: No complaints. Has been shoveling snow during recent snowstorms and has not had any significant limitations.     No episodes of syncope or presyncope.     His lyme titers were negative, his thyroid function was normal . Holter did not demonstrate any advanced heart block.     CC: Bradycardia/heart block-Mobitz type I    HPI  78 y.o. male presents for evaluation of an abnormal ECG noted in his primary care nurse practitioner clinic.  He has no complaints at this time.  His ECG in the primary care's office reportedly demonstrated Mobitz type I second-degree heart block.   Currently his ECG in the office shows sinus rhythm with a long first-degree heart block but fixed PA interval.    He has no complaints, is able to do yard work and all of his day-to-day work without any complaints or restrictions.  He has no new onset shortness of breath or fatigue.    He has does have carotid artery disease that has been followed up by vascular surgery and has had  "progression of carotid artery atherosclerosis.    He is hypertensive and takes chlorthalidone 25 mg (with potassium supplementation ), losartan 50 mg, and baby aspirin      The patient denies chest pain, shortness of breath, palpitations, orthopnea, PND, pedal edema, syncope, presyncope, diaphoresis, nausea/vomiting       The 10-year ASCVD risk score (Hayder TAYLOR, et al., 2019) is: 30.7%    Values used to calculate the score:      Age: 78 years      Sex: Male      Is Non- : No      Diabetic: No      Tobacco smoker: No      Systolic Blood Pressure: 130 mmHg      Is BP treated: Yes      HDL Cholesterol: 61 mg/dL      Total Cholesterol: 145 mg/dL            Physical exam  Objective   Vitals: Blood pressure 130/64, pulse 77, height 5' 7\" (1.702 m), weight 98.2 kg (216 lb 9.6 oz), SpO2 98%.    General:  AO x3, no acute distress  Cardiac:  S1-S2 normal,  No murmurs, rubs or gallops, JVP: normal  Lungs:  Clear to auscultation bilaterally, no wheezing or crackles.  Abdomen:  Soft, nontender, nondistended.  Extremities:  Warm, well perfused, pulses palpable, no ulcers or rashes. Edema:absent  Neuro: Grossly nonfocall        ======================================================  TREADMILL STRESS  No results found for this or any previous visit.     ----------------------------------------------------------------------------------------------  NUCLEAR STRESS TEST: No results found for this or any previous visit.    No results found for this or any previous visit.      --------------------------------------------------------------------------------  CATH:  No results found for this or any previous visit.    --------------------------------------------------------------------------------  ECHO:   No results found for this or any previous visit.    No results found for this or any previous visit.    --------------------------------------------------------------------------------  HOLTER  No results found " for this or any previous visit.    --------------------------------------------------------------------------------  CAROTIDS  Results for orders placed during the hospital encounter of 09/05/23    VAS carotid complete study    Narrative  [facility] REPORT  CLINICAL:  Indications:  Surveillance of carotid artery disease.  Patient is asymptomatic at this time.  Operative History:  Patient denies any cardiovascular surgeries  Risk Factors  The patient has history of HTN and Hyperlipidemia.  Clinical  Right Pressure:  134/70 mm Hg, Left Pressure:  132/70 mm Hg.    FINDINGS:    Right        Impression  PSV  EDV (cm/s)  Direction of Flow  Ratio  Dist. ICA                 71          16                      0.78  Mid. ICA                  92          26                      1.02  Prox. ICA    1 - 49%     100          25                      1.11  Dist CCA                  92          21  Mid CCA                   90          21                      0.90  Prox CCA                 100          17                      0.63  Ext Carotid              150          11                      1.67  Prox Vert                 47          14  Antegrade  Subclavian               141           0  Innominate               158           0    Left         Impression  PSV  EDV (cm/s)  Direction of Flow  Ratio  Dist. ICA                 65          21                      0.61  Mid. ICA                  80          22                      0.74  Prox. ICA    1 - 49%     108          30                      1.01  Dist CCA                  86          17  Mid CCA                  108          19                      0.87  Prox CCA                 123          19  Ext Carotid              119          17                      1.10  Prox Vert                 52          16  Antegrade  Subclavian   1 - 49%     325           0        CONCLUSION:    Impression    RIGHT:  There is <50% stenosis noted in the internal carotid artery. Plaque  is  heterogenous and irregular.  Vertebral artery flow is antegrade. There is no significant subclavian artery  disease.    LEFT:  There is <50% stenosis noted in the internal carotid artery. Plaque is  heterogenous and irregular.  Vertebral artery flow is antegrade. There is >50% stenosis in the subclavian  artery.    In comparison to the study of 01/09/2022, the >50% stenosis in the left  subclavian is a new finding.    SIGNATURE:  Electronically Signed by: PETROS AMBRIZ DO, RPVI on 2023-09-05 12:34:14 PM       Diagnoses and all orders for this visit:    Bilateral carotid artery stenosis  -     Lipid Panel With Direct LDL; Future       ======================================================          Review of Systems  ROS as noted above, otherwise 12 point review of systems was performed and is negative.     Historical Information   Past Medical History:   Diagnosis Date    Allergy     last assessed - [id number]    Arthritis     Cataracts, bilateral 09/19/2017    Had bilateral cataract surgery    Chronic right shoulder pain 01/19/2017    Dyspepsia     last assessed - [id number]    GERD (gastroesophageal reflux disease)     Heart murmur     Hyperlipidemia     Hypertension     Onychomycosis     last assessed - [id number]    Plantar fasciitis      Past Surgical History:   Procedure Laterality Date    COLONOSCOPY  2004    complete colonoscopy    NEUROPLASTY / TRANSPOSITION MEDIAN NERVE AT CARPAL TUNNEL BILATERAL  2002    ROTATOR CUFF REPAIR Bilateral     Right - Resolved - Aug 2004; Left - Resolved - Feb 2006     Social History     Substance and Sexual Activity   Alcohol Use Yes     Social History     Substance and Sexual Activity   Drug Use No     Social History     Tobacco Use   Smoking Status Never    Passive exposure: Never   Smokeless Tobacco Never     Family History   Problem Relation Age of Onset    Crohn's disease Mother        Meds/Allergies   Hospital Medications:   No current facility-administered  "medications for this visit.     Home Medications: Not in a hospital admission.      No Known Allergies      Portions of the record may have been created with voice recognition software.  Occasional wrong words or \"sound a like\" substitutions may have occurred due to the inherent limitations of voice recognition software.  Read the chart carefully and recognize, using context, where substitutions have occurred.        I spent > 35 mins examining and interviewing the patient, coordinating care, communicating with other physicians, reviewing the chart, reviewing testing and writing the note.                   "

## 2024-07-24 ENCOUNTER — HOSPITAL ENCOUNTER (OUTPATIENT)
Facility: HOSPITAL | Age: 79
Setting detail: OUTPATIENT SURGERY
Discharge: HOME | End: 2024-07-24
Attending: INTERNAL MEDICINE | Admitting: INTERNAL MEDICINE
Payer: MEDICARE

## 2024-07-24 VITALS
HEART RATE: 78 BPM | WEIGHT: 210 LBS | BODY MASS INDEX: 32.96 KG/M2 | RESPIRATION RATE: 17 BRPM | SYSTOLIC BLOOD PRESSURE: 147 MMHG | OXYGEN SATURATION: 97 % | TEMPERATURE: 97.5 F | DIASTOLIC BLOOD PRESSURE: 81 MMHG | HEIGHT: 67 IN

## 2024-07-24 DIAGNOSIS — Z98.890 HISTORY OF LOOP RECORDER: Primary | ICD-10-CM

## 2024-07-24 DIAGNOSIS — R00.1 BRADYCARDIA: ICD-10-CM

## 2024-07-24 PROCEDURE — C1764 EVENT RECORDER, CARDIAC: HCPCS | Performed by: INTERNAL MEDICINE

## 2024-07-24 PROCEDURE — 33285 INSJ SUBQ CAR RHYTHM MNTR: CPT | Performed by: INTERNAL MEDICINE

## 2024-07-24 PROCEDURE — NC001 PR NO CHARGE: Performed by: PHYSICIAN ASSISTANT

## 2024-07-24 DEVICE — LOOP RECORDER REVEAL LINQ II SYS DEVICE ONLY: Type: IMPLANTABLE DEVICE | Status: FUNCTIONAL

## 2024-07-24 RX ORDER — LIDOCAINE HYDROCHLORIDE 10 MG/ML
INJECTION, SOLUTION EPIDURAL; INFILTRATION; INTRACAUDAL; PERINEURAL
Status: DISCONTINUED
Start: 2024-07-24 | End: 2024-07-24 | Stop reason: HOSPADM

## 2024-07-24 RX ORDER — LIDOCAINE HYDROCHLORIDE 10 MG/ML
INJECTION, SOLUTION EPIDURAL; INFILTRATION; INTRACAUDAL; PERINEURAL CODE/TRAUMA/SEDATION MEDICATION
Status: DISCONTINUED | OUTPATIENT
Start: 2024-07-24 | End: 2024-07-24 | Stop reason: HOSPADM

## 2024-07-24 NOTE — H&P
Stefan Loving is a 78 year old male with a history of 1st degree AV block, 2nd degree AV block Mobitz type 1 overnight, bradycardia, PACs, PVCs, and 2.8 second pauses noted on Holter monitor. He presents for implantation of a loop recorder.     Plan:  Loop recorder implant  Pair remote monitor  Keep implant site dry x 1 week  D/C home post procedure  F/U device clinic in 2 weeks for wound check and device interrogation

## 2024-07-24 NOTE — DISCHARGE INSTR - AVS FIRST PAGE
LOOP RECORDER INSTRUCTIONS:  - Keep loop recorder incision dry for one week. Do not use lotions, powders, creams, or ointments on incision.     - Remove outer bandage 24-48 hours after procedure. There is waterproof glue present over the incision. This should keep the incision dry. Avoid picking at the glue or peeling it off. The glue will come off in its own time.     - Because the glue is waterproof, it is safe to shower if the glue remains on over the incision. It is ok to let the soap and water gently run over the incision. Avoid direct exposure to the stream of water. Do not soak the incision. Do not scrub. Pat dry.    - If the glue comes off in less that 7 days, place a waterproof bandage over the incision before showering.    - If present, leave underlying steri-strips in place. They will either fall off on their own or will be removed at the 2 week follow up appointment. If present, leave stitches in place. They will be removed at the 2 week follow up appointment.    - Please call the office if you notice redness, swelling, bleeding, or drainage from incision or if you develop fevers.      Cardiac Loop Recorder Insertion      WHAT YOU SHOULD KNOW:    A cardiac loop recorder is a device used to diagnose heart rhythm problems, such as a fast or irregular heartbeat. It is implanted in your left chest, just under the skin. The device records a pattern of your heart's rhythm, called an EKG. Your device records automatic EKGs, depending on how your caregiver programs it. You may also receive a handheld controller. You press a button on the controller when you have symptoms, such as dizziness, lightheadedness, or palpitations. The device will record an EKG at that moment. The recording can help your caregiver see if your symptoms may be caused by heart rhythm problems. Your caregiver will remove the device after it has collected enough data. You may need the device for up to 3 years. The procedure to remove the  device is similar to the procedure used to implant it.      AFTER YOU LEAVE:    Follow up with your cardiologist as directed: You will need to present to the office in 2 weeks. A nurse at the device clinic will check your incision and remove any stitches or steri strips that may be present. They may also program your device settings again. They will retrieve data from the device every 1 to 3 months with a monitor held over your skin. You may be able to transmit data from your device from home as well. You will do this by calling a number provided by the device clinic or as they have instructed you. Ask for information about this process. Write down your questions so you remember to ask them during your visits.      Wound care: Keep loop recorder incision dry for one week. Do not use lotions, powders, creams, or ointments on incision. Remove outer bandage 24-48 hours after procedure. There is waterproof glue present over the incision. This should keep the incision dry. Avoid picking at the glue or peeling it off. The glue will come off in its own time. Because the glue is waterproof, it is safe to shower if the glue remains on over the incision. It is ok to let the soap and water gently run over the incision. Avoid direct exposure to the stream of water. Do not soak the incision. Do not scrub. Pat dry.If the glue comes off in less that 7 days, place a waterproof bandage over the incision before showering. If present, leave underlying steri-strips in place. They will either fall off on their own or will be removed at the 2 week follow up appointment. If present, leave stitches in place. They will be removed at the 2 week follow up appointment.    Please call the office if you notice redness, swelling, bleeding, or drainage from incision or if you develop fevers.    Return to activity: If you received anesthesia, you will not be able to drive for 24 hours. Otherwise, most people can return to normal activities soon  after the procedure. Your cardiologist may want to know if your work involves electrical current or high-voltage equipment. Ask about other electrical items that could interfere with your cardiac loop recorder.      Contact your cardiologist if:   You have a fever or chills.    Your wound is red, swollen, or draining pus.    You have questions or concerns about your condition or care.    Seek care immediately or call 911 if:   You feel weak, dizzy, or faint.    You lose consciousness.      © 2014 [employer]. Information is for End User's use only and may not be sold, redistributed or otherwise used for commercial purposes. All illustrations and images included in CareNotes® are the copyrighted property of [employer], [employer]. or [employer].    The above information is an  only. It is not intended as medical advice for individual conditions or treatments. Talk to your doctor, nurse or pharmacist before following any medical regimen to see if it is safe and effective for you.

## 2024-08-06 ENCOUNTER — IN-CLINIC DEVICE VISIT (OUTPATIENT)
Dept: CARDIOLOGY CLINIC | Facility: CLINIC | Age: 79
End: 2024-08-06

## 2024-08-06 DIAGNOSIS — R55 SYNCOPE, UNSPECIFIED SYNCOPE TYPE: Primary | ICD-10-CM

## 2024-08-06 PROCEDURE — 99024 POSTOP FOLLOW-UP VISIT: CPT

## 2024-08-06 NOTE — PROGRESS NOTES
Results for orders placed or performed in visit on 08/06/24   Cardiac EP device report    Narrative    MDT LNQ22 ILR/ACTIVE SYSTEM IS MRI CONDITIONAL  DEVICE INTERROGATED IN THE [facility] OFFICE. BATTERY VOLTAGE ADEQUATE (GOOD). NO PATIENT OR DEVICE ACTIVATED EPISODES; EPISODES LISTED ALREADY ADDRESSED. WOUND CHECK: INCISION CLEAN AND DRY WITH EDGES APPROXIMATED; SUTURES REMOVED; WOUND CARE AND RESTRICTIONS REVIEWED WITH PATIENT. NORMAL DEVICE FUNCTION. AM

## 2024-09-06 ENCOUNTER — HOSPITAL ENCOUNTER (OUTPATIENT)
Dept: NON INVASIVE DIAGNOSTICS | Facility: CLINIC | Age: 79
Discharge: HOME | End: 2024-09-06
Payer: MEDICARE

## 2024-09-06 DIAGNOSIS — I65.23 CAROTID STENOSIS, ASYMPTOMATIC, BILATERAL: ICD-10-CM

## 2024-09-06 DIAGNOSIS — I77.1 SUBCLAVIAN ARTERY STENOSIS, LEFT (HCC): ICD-10-CM

## 2024-09-06 PROCEDURE — 93880 EXTRACRANIAL BILAT STUDY: CPT

## 2024-09-07 PROCEDURE — 93880 EXTRACRANIAL BILAT STUDY: CPT | Performed by: SURGERY

## 2024-09-18 ENCOUNTER — PREP FOR PROCEDURE (OUTPATIENT)
Dept: CARDIOLOGY CLINIC | Facility: CLINIC | Age: 79
End: 2024-09-18

## 2024-09-18 ENCOUNTER — OFFICE VISIT (OUTPATIENT)
Dept: CARDIOLOGY CLINIC | Facility: CLINIC | Age: 79
End: 2024-09-18
Payer: MEDICARE

## 2024-09-18 ENCOUNTER — TELEPHONE (OUTPATIENT)
Dept: CARDIOLOGY CLINIC | Facility: CLINIC | Age: 79
End: 2024-09-18

## 2024-09-18 VITALS
BODY MASS INDEX: 33.95 KG/M2 | DIASTOLIC BLOOD PRESSURE: 72 MMHG | SYSTOLIC BLOOD PRESSURE: 120 MMHG | HEIGHT: 67 IN | HEART RATE: 53 BPM | WEIGHT: 216.3 LBS

## 2024-09-18 DIAGNOSIS — R55 SYNCOPE, UNSPECIFIED SYNCOPE TYPE: ICD-10-CM

## 2024-09-18 DIAGNOSIS — R00.1 BRADYCARDIA: Primary | ICD-10-CM

## 2024-09-18 DIAGNOSIS — I44.1 HEART BLOCK AV SECOND DEGREE: ICD-10-CM

## 2024-09-18 PROCEDURE — 99215 OFFICE O/P EST HI 40 MIN: CPT | Performed by: INTERNAL MEDICINE

## 2024-09-18 PROCEDURE — 93000 ELECTROCARDIOGRAM COMPLETE: CPT | Performed by: INTERNAL MEDICINE

## 2024-09-18 NOTE — PATIENT INSTRUCTIONS
Our office will be in touch with you to set-up the pacemaker placement and loop monitor removal.

## 2024-09-18 NOTE — LETTER
2024               DEVICE PROCEDURE INSTRUCTIONS    Stefan Loving   : 1945  MRN: [id number]  [address]  [place] [address]    Procedure: DUAL CHAMBER PACER IMPLANT / LOOP EXPLANT      Procedure Date: THURSDAY 10/31/24     Location: [facility]  Address: [address] [place] [address], [place] [address]        Labs to be done on NO LATER THAN 10/24/24 : CMP /  CBC (FASTING 8 HOURS)    BLOOD THINNER INSTRUCTIONS (Coumadin / Warfarin / Pradaxa / Eliquis / Xarelto):     NO HOLDS     Medication Hold:     NO HOLDS    Arrival Time: The Hospital will contact you the day prior to your procedure, usually between 4PM - 6PM to instruct you on the time and place to report. If you do not hear from a [facility]  by 6PM the evening prior to your procedure, please contact the campus you are scheduled at.     DO NOT drink or eat ANYTHING after midnight the night prior to your procedure. You may have a minimal amount of water with your AM medications.    Arrange for a responsible adult to drive you to and from the hospital.    You should continue to take your morning medications with a sip of water UNLESS ADVISED OTHERWISE.       DO NOT stop taking Plavix or Aspirin unless advised otherwise.      If you are currently taking Fish Oil, Krill oil and/or Vitamin E please DO NOT TAKE FOR A WEEK PRIOR TO PROCEDURE.     If you are diabetic, DO NOT take any of your diabetic pills the morning of your procedure. Oral diabetic medications may include Glucophage, Prandin, Glyburide, Micronase, Avandia, Glucovance, Precose, Glynase, and Starlix.     Bring a list of daily medications, vitamins, minerals, herbals and nutritional supplements you take. Please include dosages and the times you take them each day.     If you are packing an overnight bag, pack minimal clothing, you will be given hospital sleepwear.   DO NOT bring money, valuables or jewelry. The wedding band is ok.     If you use CPAP  machine, bring it to the hospital.     Bring your Photo ID and Insurance cards with you.     Please notify us if you have been admitted to the hospital within 30 days.    Pre-Operative Showering Instructions. ONLY FOR DEVICE PROCEDURE.    The two nights prior to your procedure, take a shower each night using the special antiseptic body scrub (Chlorhexidine Scrub Brush) you received from the office or hospital. This scrub will replace your soap at home, the sponge is pre-filled with soap.     The scrub brushes are single use only and should be discarded after use.     Shampoo your hair with regular shampoo and rinse completely before using the antiseptic scrub brush.     Using the sponge side of the scrub brush to wash your entire body from your neck down, with special attention to your armpits, chest and groin area. DO NOT USE the scrub on your face and avoid any open wounds. Do not use any other soap or wash your skin.    DO NOT USE any lotion, powder, deodorant or perfume of any kind on your skin after you shower.    AFTER THE FIRST NIGHT of using the scrub, change your bed linen sheets to a fresh clean set and clean pajamas for bedtime.    AFTER THE SECOND NIGHT of using the scrub, use clean pajamas for bedtime.    DO NOT SHOWER THE MORNING OF SURGERY, you will be prepped and cleansed at the hospital prior to your procedure.       PLEASE  THE SP[facility] AT YOUR MOST CONVENIENT [facility] OFFICE.      FAILURE TO FOLLOW ANY OF THESE INSTRUCTIONS COULD RESULT IN THE CANCELLATION OF YOUR PROCEDURE      Please call  [phone] (Alex) or [phone] (Jatin) if you do not hear from the  by 6:00PM the night before your procedure.    All patients enter through ENTRANCE B.  Parking is available free of charge or park on Parking Deck B.        Thank you,   Georgie Chew  Surgery Coordinator  [facility]   [address] [place] [id number]  Teams: [phone]

## 2024-09-18 NOTE — TELEPHONE ENCOUNTER
Patient scheduled for  DUAL CHAMBER PPM IMPLANT  device LOOP EXPLANT [facility]      Patient aware of all general instructions.    Medication holds:   Losartan - Do not take day before and morning of procedure.      Labs to be done no later than 10/24/24  CMP / CBC (FASTING 8 HOURS)      Thank you,  Georgie Chew

## 2024-09-18 NOTE — PROGRESS NOTES
"EPS Follow-up Patient Evaluation - Stefan Loving 78 y.o. male MRN: [id number]       Referring:Dr. Muñoz    CC/HPI:   It was a pleasure to see Stefan Loving in our arrhythmia clinic at [facility]. As you know he is a 78 y.o. man with Mobitz Type 1, HLD, carotid artery atherosclerosis, HTN who presented 6/4/24 to discuss management of heart block.     Patient was seen in our general cardiology clinic for an abnormal ECG seen at primary care nurse clinic. He had ECG showing Mobitz Type 1 second degree heart block. He had Mobitz Type 1 block on ECG. His other ECG has shown 1st dgree AV delay. Patient has been able to do yard work and does not feel symptoms with it. He had negative lyme titers. Holter monitor in Dec 2023 showed sinus bradycardia with PAC, PVC. He had 2.8 second pauses. He had baseline 1st degree AV delay and second degree mobitz Type 1 block through night.     He presented with his wife who also provides part of the history. He has not been having any symptoms including dizziness, orthopnea, PND or syncope. He has been having lightheadedness when standing up. He denies any syncopal episodes.     Interval history:    Stefan underwent loop monitor placement after last visit. He has been having bradycardia at night with heart rate in 30s. He does not feel dizziness or lightheadedness. He has not had syncope. He does get \"error\" when checking BP.     Past Medical History:  Past Medical History:   Diagnosis Date    Allergy     last assessed - [id number]    Arthritis     Cataracts, bilateral 09/19/2017    Had bilateral cataract surgery    Chronic right shoulder pain 01/19/2017    Dyspepsia     last assessed - [id number]    GERD (gastroesophageal reflux disease)     Heart murmur     Hyperlipidemia     Hypertension     Onychomycosis     last assessed - [id number]    Plantar fasciitis     s/p Medtronic loop recorder 7/24/2024 07/24/2024       Medications:      Current Outpatient " Medications:     Ascorbic Acid (VITAMIN C) 100 MG tablet, Take 100 mg by mouth daily, Disp: , Rfl:     aspirin 81 MG tablet, Take 1 tablet by mouth daily, Disp: , Rfl:     chlorthalidone 25 mg tablet, TAKE 1 TABLET BY MOUTH DAILY, Disp: 90 tablet, Rfl: 1    cholecalciferol (VITAMIN D3) 1,000 units tablet, Take 500 Units by mouth daily, Disp: , Rfl:     clotrimazole-betamethasone (LOTRISONE) 1-0.05 % cream, Apply topically 2 (two) times a day as needed (rash), Disp: 30 g, Rfl: 2    Ginkgo Biloba 40 MG TABS, Take by mouth in the morning, Disp: , Rfl:     glucosamine 500 MG CAPS capsule, Take 500 mg by mouth, Disp: , Rfl:     losartan (COZAAR) 50 mg tablet, TAKE 1 TABLET BY MOUTH DAILY, Disp: 90 tablet, Rfl: 3    Multiple Vitamins-Minerals (MULTIVITAMIN WITH MINERALS) tablet, Take 1 tablet by mouth daily, Disp: , Rfl:     Omega-3 Fatty Acids (FISH OIL) 1,000 mg, Take 1,000 mg by mouth daily, Disp: , Rfl:     potassium chloride (K-DUR,KLOR-CON) 20 mEq tablet, TAKE 1 TABLET BY MOUTH  TWICE DAILY (Patient taking differently: 20 mEq daily), Disp: 180 tablet, Rfl: 3    RESTASIS 0.05 % ophthalmic emulsion, INSTILL 1 DROP BY OPHTHALMIC ROUTE 2 TIMES EVERY DAY INTO BOTH EYES AS NEEDED, Disp: , Rfl: 4    rosuvastatin (CRESTOR) 40 MG tablet, Take 1 tablet (40 mg total) by mouth daily, Disp: 90 tablet, Rfl: 3     Family History   Problem Relation Age of Onset    Crohn's disease Mother      Social History     Socioeconomic History    Marital status: /Civil Union     Spouse name: Not on file    Number of children: Not on file    Years of education: Not on file    Highest education level: Not on file   Occupational History    Not on file   Tobacco Use    Smoking status: Never     Passive exposure: Never    Smokeless tobacco: Never   Vaping Use    Vaping status: Never Used   Substance and Sexual Activity    Alcohol use: Yes    Drug use: No    Sexual activity: Not Currently     Birth control/protection: Abstinence   Other  Topics Concern    Not on file   Social History Narrative    Not on file     Social Determinants of Health     Financial Resource Strain: Low Risk  (4/18/2023)    Overall Financial Resource Strain (CARDIA)     Difficulty of Paying Living Expenses: Not hard at all   Food Insecurity: No Food Insecurity (4/22/2024)    Hunger Vital Sign     Worried About Running Out of Food in the Last Year: Never true     Ran Out of Food in the Last Year: Never true   Transportation Needs: No Transportation Needs (4/22/2024)    PRAPARE - Transportation     Lack of Transportation (Medical): No     Lack of Transportation (Non-Medical): No   Physical Activity: Not on file   Stress: Not on file   Social Connections: Not on file   Intimate Partner Violence: Not on file   Housing Stability: Unknown (4/22/2024)    Housing Stability Vital Sign     Unable to Pay for Housing in the Last Year: No     Number of Times Moved in the Last Year: Not on file     Homeless in the Last Year: No     Social History     Tobacco Use   Smoking Status Never    Passive exposure: Never   Smokeless Tobacco Never     Social History     Substance and Sexual Activity   Alcohol Use Yes       Review of Systems   Constitutional: Negative for chills and fever.   HENT: Negative.     Eyes:  Negative for blurred vision and double vision.   Cardiovascular:  Negative for chest pain, dyspnea on exertion, leg swelling, near-syncope, orthopnea, palpitations, paroxysmal nocturnal dyspnea and syncope.   Respiratory:  Negative for cough and sputum production.    Endocrine: Negative.    Skin: Negative.  Negative for rash.   Musculoskeletal: Negative.  Negative for arthritis and joint pain.   Gastrointestinal:  Negative for abdominal pain, nausea and vomiting.   Genitourinary: Negative.    Neurological: Negative.  Negative for dizziness and light-headedness.   Psychiatric/Behavioral: Negative.  The patient is not nervous/anxious.         Objective:     Vitals: Blood pressure 120/72,  "pulse (!) 53, height 5' 7\" (1.702 m), weight 98.1 kg (216 lb 4.8 oz)., Body mass index is 33.88 kg/m².,        Physical Exam:    GEN: Stefan Loving appears well, alert and oriented x 3, pleasant and cooperative   HEENT: pupils equal, round, and reactive to light; extraocular muscles intact  NECK: supple, no carotid bruits   HEART: regular rhythm but bradycardic, normal S1 and S2, no murmurs, clicks, gallops or rubs   LUNGS: clear to auscultation bilaterally; no wheezes, rales, or rhonchi   ABDOMEN: normal bowel sounds, soft, no tenderness, no distention  EXTREMITIES: peripheral pulses normal; no clubbing, cyanosis, or edema  NEURO: no focal findings   SKIN: normal without suspicious lesions on exposed skin      Labs & Results:  Below is the patient's most recent value for Albumin, ALT, AST, BUN, Calcium, Chloride, Cholesterol, CO2, Creatinine, GFR, Glucose, HDL, Hematocrit, Hemoglobin, Hemoglobin A1C, LDL, Magnesium, Phosphorus, Platelets, Potassium, PSA, Sodium, Triglycerides, and WBC.   Lab Results   Component Value Date    ALT 20 03/14/2024    AST 22 03/14/2024    BUN 23 03/14/2024    CALCIUM 9.7 03/14/2024     03/14/2024    CHOL 147 07/14/2015    CO2 28 03/14/2024    CREATININE 1.22 03/14/2024    HDL 61 03/14/2024    HCT 40.7 03/14/2024    HGB 14.1 03/14/2024    HGBA1C 5.1 03/31/2022    MG 2.3 03/17/2022     03/14/2024    K 3.7 03/14/2024    PSA 3.56 03/14/2024     08/17/2015    TRIG 100 03/14/2024    WBC 6.42 03/14/2024     Note: for a comprehensive list of the patient's lab results, access the Results Review activity.          Cardiac testing:     I personally reviewed the ECG performed in the clinic on 09/18/24. It reveals Sinsu rhythm with 1st degree AV delay 368 ms.     Echocardiograms:  No results found for this or any previous visit.    No results found for this or any previous visit.      Catheterizations:   No results found for this or any previous visit.      Stress Tests:  No " results found for this or any previous visit.      Holter monitor -   No results found for this or any previous visit.    No results found for this or any previous visit.        ASSESSMENT/PLAN:  AV node disease  Patient has prolonged AV delay on ECG  Holter monitor has shown Mobitz Type 1 block  Not having any symptoms or syncope  Would not meet criteria for pacemaker unless he has symptoms  Discussed loop monitor to detect higher degree heart block  Loop monitor has shown AV block with prolonged WI interval and heart rate down to 30s  ECG today shows heart rate of 50s suggesting heart block noted on loop monitor is likely progression of his AV node disease  He is progressing toward complete heart block from significant AV node disease  Would benefit from pacemaker for AV node block  Discussed pacemaker and loop explant which he is agreeable  Office will be in touch with him to set-up the procedures.   HTN  Normotensive in the office  Maintained on losartan and chlorthalidone   HLD  Maintained on Crestor 40 mg daily

## 2024-10-08 ENCOUNTER — TELEPHONE (OUTPATIENT)
Dept: VASCULAR SURGERY | Facility: CLINIC | Age: 79
End: 2024-10-08

## 2024-10-08 ENCOUNTER — OFFICE VISIT (OUTPATIENT)
Dept: VASCULAR SURGERY | Facility: CLINIC | Age: 79
End: 2024-10-08
Payer: MEDICARE

## 2024-10-08 VITALS
HEIGHT: 67 IN | SYSTOLIC BLOOD PRESSURE: 138 MMHG | OXYGEN SATURATION: 100 % | BODY MASS INDEX: 33.67 KG/M2 | WEIGHT: 214.5 LBS | RESPIRATION RATE: 18 BRPM | HEART RATE: 71 BPM | DIASTOLIC BLOOD PRESSURE: 60 MMHG

## 2024-10-08 DIAGNOSIS — I65.23 CAROTID STENOSIS, ASYMPTOMATIC, BILATERAL: Primary | Chronic | ICD-10-CM

## 2024-10-08 DIAGNOSIS — I77.1 SUBCLAVIAN ARTERY STENOSIS, LEFT (HCC): ICD-10-CM

## 2024-10-08 PROCEDURE — 99213 OFFICE O/P EST LOW 20 MIN: CPT | Performed by: NURSE PRACTITIONER

## 2024-10-08 NOTE — ASSESSMENT & PLAN NOTE
78 year-old male with HTN, HLD, CKD3, first-degree AV block, mild bilateral ICA stenosis and left subclavian artery stenosis.     Patient returns to the office for yearly visit to review carotid duplex 9/6/2024    -CV duplex showed mild bilateral ICA less than 50% stenosis, vertebral flow is antegrade, no significant subclavian artery stenosis noted  -Continue aspirin and rosuvastatin  -Blood pressure well-controlled  -Repeat duplex in 2 years  -Follow-up in the office in 2 years    Orders:    VAS carotid complete study; Future

## 2024-10-08 NOTE — PROGRESS NOTES
Ambulatory Visit  Name: Stefan Loving      : 1945      MRN: [id number]  Encounter Provider: VEGA Mcdowell  Encounter Date: 10/8/2024   Encounter department: [facility]    Assessment & Plan  Carotid stenosis, asymptomatic, bilateral  78 year-old male with HTN, HLD, CKD3, first-degree AV block, mild bilateral ICA stenosis and left subclavian artery stenosis.     Patient returns to the office for yearly visit to review carotid duplex 2024    -CV duplex showed mild bilateral ICA less than 50% stenosis, vertebral flow is antegrade, no significant subclavian artery stenosis noted  -Continue aspirin and rosuvastatin  -Blood pressure well-controlled  -Repeat duplex in 2 years  -Follow-up in the office in 2 years    Orders:    VAS carotid complete study; Future    Subclavian artery stenosis, left (HCC)  -Left subclavian artery stenosis not noted on most recent carotid duplex  -Easily palpable radial pulses bilaterally  -Recommended blood pressure in right arm if discrepancy in readings   -Will reevaluate on next carotid duplex    Orders:    VAS carotid complete study; Future      Chief Complaint   Patient presents with    Carotid Artery Disease     Pt here for RR of CV done on 24.  Denies any TIA or  stroke Symptoms.       History of Present Illness     Stefan Loving is a 78 y.o. male who presents for yearly visit to review carotid duplex 2024.  He has mild bilateral ICA stenosis.  No history of stroke.  He denies any upper extremity claudication symptoms.  Blood pressure adequately controlled.  He is maintained on aspirin and atorvastatin.  He is active. No limitation in daily activities. He denies any lower extremity claudication symptoms.    History obtained from : patient  Review of Systems   Constitutional: Negative.    HENT: Negative.     Cardiovascular:  Negative for leg swelling.   Gastrointestinal: Negative.    Genitourinary: Negative.    Musculoskeletal:  "Negative.    Neurological: Negative.    Hematological: Negative.    Psychiatric/Behavioral: Negative.       Medical History Reviewed by provider this encounter:  Tobacco  Allergies  Meds  Problems  Med Hx  Surg Hx  Fam Hx           Objective   I have reviewed and made appropriate changes to the review of systems input by the medical assistant.    Vitals:    10/08/24 1137 10/08/24 1138   BP: 144/64 138/60   BP Location: Right arm Left arm   Patient Position: Sitting Sitting   Cuff Size: Large Large   Pulse: 71    Resp: 18    SpO2: 100%    Weight: 97.3 kg (214 lb 8 oz)    Height: 5' 7\" (1.702 m)        Patient Active Problem List   Diagnosis    Benign essential hypertension    Carotid stenosis, asymptomatic, bilateral    First degree AV block    Hypercholesterolemia    BMI 33.0-33.9,adult    Seborrheic keratoses    Osteoarthritis    Mixed conductive and sensorineural hearing loss of both ears    Hypokalemia    Dry eye syndrome of both eyes    Stage 3a chronic kidney disease (HCC)    Subclavian artery stenosis, left (HCC)    s/p [employer] loop recorder 7/24/2024       Past Surgical History:   Procedure Laterality Date    CARDIAC ELECTROPHYSIOLOGY PROCEDURE N/A 7/24/2024    Procedure: Cardiac loop recorder implant;  Surgeon: Juwan Corcoran MD;  Location: [facility];  Service: Cardiology    COLONOSCOPY  2004    complete colonoscopy    NEUROPLASTY / TRANSPOSITION MEDIAN NERVE AT CARPAL TUNNEL BILATERAL  2002    ROTATOR CUFF REPAIR Bilateral     Right - Resolved - Aug 2004; Left - Resolved - Feb 2006       Family History   Problem Relation Age of Onset    Crohn's disease Mother        Social History     Socioeconomic History    Marital status: /Civil Union     Spouse name: Not on file    Number of children: Not on file    Years of education: Not on file    Highest education level: Not on file   Occupational History    Not on file   Tobacco Use    Smoking status: Never     Passive exposure: Never "    Smokeless tobacco: Never   Vaping Use    Vaping status: Never Used   Substance and Sexual Activity    Alcohol use: Yes    Drug use: No    Sexual activity: Not Currently     Birth control/protection: Abstinence   Other Topics Concern    Not on file   Social History Narrative    Not on file     Social Determinants of Health     Financial Resource Strain: Low Risk  (4/18/2023)    Overall Financial Resource Strain (CARDIA)     Difficulty of Paying Living Expenses: Not hard at all   Food Insecurity: No Food Insecurity (4/22/2024)    Hunger Vital Sign     Worried About Running Out of Food in the Last Year: Never true     Ran Out of Food in the Last Year: Never true   Transportation Needs: No Transportation Needs (4/22/2024)    PRAPARE - Transportation     Lack of Transportation (Medical): No     Lack of Transportation (Non-Medical): No   Physical Activity: Not on file   Stress: Not on file   Social Connections: Not on file   Intimate Partner Violence: Not on file   Housing Stability: Unknown (4/22/2024)    Housing Stability Vital Sign     Unable to Pay for Housing in the Last Year: No     Number of Times Moved in the Last Year: Not on file     Homeless in the Last Year: No       No Known Allergies      Current Outpatient Medications:     Ascorbic Acid (VITAMIN C) 100 MG tablet, Take 100 mg by mouth daily, Disp: , Rfl:     aspirin 81 MG tablet, Take 1 tablet by mouth daily, Disp: , Rfl:     chlorthalidone 25 mg tablet, TAKE 1 TABLET BY MOUTH DAILY, Disp: 90 tablet, Rfl: 1    cholecalciferol (VITAMIN D3) 1,000 units tablet, Take 500 Units by mouth daily, Disp: , Rfl:     clotrimazole-betamethasone (LOTRISONE) 1-0.05 % cream, Apply topically 2 (two) times a day as needed (rash), Disp: 30 g, Rfl: 2    Ginkgo Biloba 40 MG TABS, Take by mouth in the morning, Disp: , Rfl:     glucosamine 500 MG CAPS capsule, Take 500 mg by mouth, Disp: , Rfl:     losartan (COZAAR) 50 mg tablet, TAKE 1 TABLET BY MOUTH DAILY, Disp: 90 tablet,  "Rfl: 3    Multiple Vitamins-Minerals (MULTIVITAMIN WITH MINERALS) tablet, Take 1 tablet by mouth daily, Disp: , Rfl:     Omega-3 Fatty Acids (FISH OIL) 1,000 mg, Take 1,000 mg by mouth daily, Disp: , Rfl:     potassium chloride (K-DUR,KLOR-CON) 20 mEq tablet, TAKE 1 TABLET BY MOUTH  TWICE DAILY (Patient taking differently: 20 mEq daily), Disp: 180 tablet, Rfl: 3    RESTASIS 0.05 % ophthalmic emulsion, INSTILL 1 DROP BY OPHTHALMIC ROUTE 2 TIMES EVERY DAY INTO BOTH EYES AS NEEDED, Disp: , Rfl: 4    rosuvastatin (CRESTOR) 40 MG tablet, Take 1 tablet (40 mg total) by mouth daily, Disp: 90 tablet, Rfl: 3   /60 (BP Location: Left arm, Patient Position: Sitting, Cuff Size: Large)   Pulse 71   Resp 18   Ht 5' 7\" (1.702 m)   Wt 97.3 kg (214 lb 8 oz)   SpO2 100%   BMI 33.60 kg/m²     Physical Exam  Vitals and nursing note reviewed. Exam conducted with a chaperone present.   Constitutional:       Appearance: Normal appearance.   Eyes:      Extraocular Movements: Extraocular movements intact.   Neck:      Vascular: No carotid bruit.   Cardiovascular:      Rate and Rhythm: Normal rate.      Pulses:           Carotid pulses are 2+ on the right side and 2+ on the left side.       Radial pulses are 2+ on the right side and 2+ on the left side.      Heart sounds: Normal heart sounds.   Pulmonary:      Effort: Pulmonary effort is normal.      Breath sounds: Normal breath sounds.   Abdominal:      General: Bowel sounds are normal.   Musculoskeletal:         General: No swelling. Normal range of motion.   Skin:     General: Skin is warm.   Neurological:      General: No focal deficit present.      Mental Status: He is alert and oriented to person, place, and time.   Psychiatric:         Mood and Affect: Mood normal.         Behavior: Behavior normal.       Administrative Statements   I have spent a total time of 20 minutes in caring for this patient on the day of the visit/encounter including Diagnostic results, Prognosis, " Risks and benefits of tx options, Instructions for management, Patient and family education, Importance of tx compliance, Risk factor reductions, Impressions, Counseling / Coordination of care, Documenting in the medical record, and Reviewing / ordering tests, medicine, procedures  .

## 2024-10-08 NOTE — ASSESSMENT & PLAN NOTE
-Left subclavian artery stenosis not noted on most recent carotid duplex  -Easily palpable radial pulses bilaterally  -Recommended blood pressure in right arm if discrepancy in readings   -Will reevaluate on next carotid duplex    Orders:    VAS carotid complete study; Future

## 2024-10-16 ENCOUNTER — APPOINTMENT (OUTPATIENT)
Dept: LAB | Facility: CLINIC | Age: 79
End: 2024-10-16
Payer: MEDICARE

## 2024-10-16 DIAGNOSIS — R00.1 BRADYCARDIA: ICD-10-CM

## 2024-10-16 DIAGNOSIS — R55 SYNCOPE, UNSPECIFIED SYNCOPE TYPE: ICD-10-CM

## 2024-10-16 DIAGNOSIS — I44.1 HEART BLOCK AV SECOND DEGREE: ICD-10-CM

## 2024-10-16 LAB
ALBUMIN SERPL BCG-MCNC: 4.4 G/DL (ref 3.5–5)
ALP SERPL-CCNC: 51 U/L (ref 34–104)
ALT SERPL W P-5'-P-CCNC: 18 U/L (ref 7–52)
ANION GAP SERPL CALCULATED.3IONS-SCNC: 11 MMOL/L (ref 4–13)
AST SERPL W P-5'-P-CCNC: 21 U/L (ref 13–39)
BASOPHILS # BLD AUTO: 0.03 THOUSANDS/ΜL (ref 0–0.1)
BASOPHILS NFR BLD AUTO: 1 % (ref 0–1)
BILIRUB SERPL-MCNC: 0.74 MG/DL (ref 0.2–1)
BUN SERPL-MCNC: 18 MG/DL (ref 5–25)
CALCIUM SERPL-MCNC: 9.2 MG/DL (ref 8.4–10.2)
CHLORIDE SERPL-SCNC: 99 MMOL/L (ref 96–108)
CO2 SERPL-SCNC: 30 MMOL/L (ref 21–32)
CREAT SERPL-MCNC: 0.95 MG/DL (ref 0.6–1.3)
EOSINOPHIL # BLD AUTO: 0.37 THOUSAND/ΜL (ref 0–0.61)
EOSINOPHIL NFR BLD AUTO: 6 % (ref 0–6)
ERYTHROCYTE [DISTWIDTH] IN BLOOD BY AUTOMATED COUNT: 11.6 % (ref 11.6–15.1)
GFR SERPL CREATININE-BSD FRML MDRD: 76 ML/MIN/1.73SQ M
GLUCOSE P FAST SERPL-MCNC: 97 MG/DL (ref 65–99)
HCT VFR BLD AUTO: 41.6 % (ref 36.5–49.3)
HGB BLD-MCNC: 14.1 G/DL (ref 12–17)
IMM GRANULOCYTES # BLD AUTO: 0.02 THOUSAND/UL (ref 0–0.2)
IMM GRANULOCYTES NFR BLD AUTO: 0 % (ref 0–2)
INR PPP: 0.93 (ref 0.85–1.19)
LYMPHOCYTES # BLD AUTO: 1.07 THOUSANDS/ΜL (ref 0.6–4.47)
LYMPHOCYTES NFR BLD AUTO: 17 % (ref 14–44)
MCH RBC QN AUTO: 30.7 PG (ref 26.8–34.3)
MCHC RBC AUTO-ENTMCNC: 33.9 G/DL (ref 31.4–37.4)
MCV RBC AUTO: 91 FL (ref 82–98)
MONOCYTES # BLD AUTO: 0.59 THOUSAND/ΜL (ref 0.17–1.22)
MONOCYTES NFR BLD AUTO: 10 % (ref 4–12)
NEUTROPHILS # BLD AUTO: 4.06 THOUSANDS/ΜL (ref 1.85–7.62)
NEUTS SEG NFR BLD AUTO: 66 % (ref 43–75)
NRBC BLD AUTO-RTO: 0 /100 WBCS
PLATELET # BLD AUTO: 248 THOUSANDS/UL (ref 149–390)
PMV BLD AUTO: 9.7 FL (ref 8.9–12.7)
POTASSIUM SERPL-SCNC: 3.7 MMOL/L (ref 3.5–5.3)
PROT SERPL-MCNC: 7.2 G/DL (ref 6.4–8.4)
PROTHROMBIN TIME: 12.8 SECONDS (ref 12.3–15)
RBC # BLD AUTO: 4.59 MILLION/UL (ref 3.88–5.62)
SODIUM SERPL-SCNC: 140 MMOL/L (ref 135–147)
WBC # BLD AUTO: 6.14 THOUSAND/UL (ref 4.31–10.16)

## 2024-10-16 PROCEDURE — 85025 COMPLETE CBC W/AUTO DIFF WBC: CPT

## 2024-10-16 PROCEDURE — 80053 COMPREHEN METABOLIC PANEL: CPT

## 2024-10-16 PROCEDURE — 36415 COLL VENOUS BLD VENIPUNCTURE: CPT

## 2024-10-16 PROCEDURE — 85610 PROTHROMBIN TIME: CPT

## 2024-10-17 ENCOUNTER — TELEPHONE (OUTPATIENT)
Dept: CARDIOLOGY CLINIC | Facility: CLINIC | Age: 79
End: 2024-10-17

## 2024-10-17 NOTE — TELEPHONE ENCOUNTER
----- Message from Juwan Corcoran MD sent at 10/16/2024  6:40 PM EDT -----  Please call the patient about normal lab results.     Thank you.

## 2024-10-28 DIAGNOSIS — I65.23 CAROTID STENOSIS, ASYMPTOMATIC, BILATERAL: Chronic | ICD-10-CM

## 2024-10-28 DIAGNOSIS — R21 RASH: ICD-10-CM

## 2024-10-28 DIAGNOSIS — I44.1 AV BLOCK, MOBITZ 1: ICD-10-CM

## 2024-10-28 NOTE — TELEPHONE ENCOUNTER
Reason for call:   [x] Refill   [] Prior Auth  [] Other:     Office:   [] PCP/Provider -   [x] Specialty/Provider - [facility]     Medication: rosuvastatin (CRESTOR) 40 MG tablet     Dose/Frequency: Take 1 tablet (40 mg total) by mouth daily,     Quantity: 90    Pharmacy: [facility]/pharmacy #[id number] - [place] - [address] [place] [address]      Does the patient have enough for 3 days?   [x] Yes   [] No - Send as HP to POD

## 2024-10-29 RX ORDER — ROSUVASTATIN CALCIUM 40 MG/1
40 TABLET, COATED ORAL DAILY
Qty: 90 TABLET | Refills: 1 | Status: SHIPPED | OUTPATIENT
Start: 2024-10-29

## 2024-10-29 RX ORDER — CLOTRIMAZOLE AND BETAMETHASONE DIPROPIONATE 10; .64 MG/G; MG/G
CREAM TOPICAL
Qty: 30 G | Refills: 2 | Status: SHIPPED | OUTPATIENT
Start: 2024-10-29

## 2024-10-30 ENCOUNTER — ANESTHESIA EVENT (OUTPATIENT)
Dept: NON INVASIVE DIAGNOSTICS | Facility: HOSPITAL | Age: 79
End: 2024-10-30
Payer: MEDICARE

## 2024-10-31 ENCOUNTER — APPOINTMENT (OUTPATIENT)
Dept: RADIOLOGY | Facility: HOSPITAL | Age: 79
End: 2024-10-31
Payer: MEDICARE

## 2024-10-31 ENCOUNTER — HOSPITAL ENCOUNTER (OUTPATIENT)
Facility: HOSPITAL | Age: 79
Setting detail: OUTPATIENT SURGERY
Discharge: HOME | End: 2024-10-31
Attending: INTERNAL MEDICINE | Admitting: INTERNAL MEDICINE
Payer: MEDICARE

## 2024-10-31 ENCOUNTER — ANESTHESIA (OUTPATIENT)
Dept: NON INVASIVE DIAGNOSTICS | Facility: HOSPITAL | Age: 79
End: 2024-10-31
Payer: MEDICARE

## 2024-10-31 VITALS
TEMPERATURE: 97.5 F | BODY MASS INDEX: 32.96 KG/M2 | DIASTOLIC BLOOD PRESSURE: 66 MMHG | HEIGHT: 67 IN | WEIGHT: 210 LBS | HEART RATE: 75 BPM | SYSTOLIC BLOOD PRESSURE: 143 MMHG | RESPIRATION RATE: 16 BRPM | OXYGEN SATURATION: 95 %

## 2024-10-31 DIAGNOSIS — I44.1 HEART BLOCK AV SECOND DEGREE: ICD-10-CM

## 2024-10-31 DIAGNOSIS — R00.1 BRADYCARDIA: ICD-10-CM

## 2024-10-31 DIAGNOSIS — R55 SYNCOPE, UNSPECIFIED SYNCOPE TYPE: ICD-10-CM

## 2024-10-31 LAB
ANION GAP SERPL CALCULATED.3IONS-SCNC: 9 MMOL/L (ref 4–13)
ATRIAL RATE: 68 BPM
ATRIAL RATE: 86 BPM
BUN SERPL-MCNC: 20 MG/DL (ref 5–25)
CALCIUM SERPL-MCNC: 9.7 MG/DL (ref 8.4–10.2)
CHLORIDE SERPL-SCNC: 103 MMOL/L (ref 96–108)
CO2 SERPL-SCNC: 27 MMOL/L (ref 21–32)
CREAT SERPL-MCNC: 0.99 MG/DL (ref 0.6–1.3)
ERYTHROCYTE [DISTWIDTH] IN BLOOD BY AUTOMATED COUNT: 11.8 % (ref 11.6–15.1)
GFR SERPL CREATININE-BSD FRML MDRD: 72 ML/MIN/1.73SQ M
GLUCOSE P FAST SERPL-MCNC: 101 MG/DL (ref 65–99)
GLUCOSE SERPL-MCNC: 101 MG/DL (ref 65–140)
HCT VFR BLD AUTO: 41.2 % (ref 36.5–49.3)
HGB BLD-MCNC: 14.1 G/DL (ref 12–17)
INR PPP: 0.98 (ref 0.85–1.19)
MAGNESIUM SERPL-MCNC: 2 MG/DL (ref 1.9–2.7)
MCH RBC QN AUTO: 31 PG (ref 26.8–34.3)
MCHC RBC AUTO-ENTMCNC: 34.2 G/DL (ref 31.4–37.4)
MCV RBC AUTO: 91 FL (ref 82–98)
P AXIS: 11 DEGREES
P AXIS: 60 DEGREES
PLATELET # BLD AUTO: 253 THOUSANDS/UL (ref 149–390)
PMV BLD AUTO: 9.7 FL (ref 8.9–12.7)
POTASSIUM SERPL-SCNC: 3.8 MMOL/L (ref 3.5–5.3)
PR INTERVAL: 356 MS
PR INTERVAL: 408 MS
PROTHROMBIN TIME: 13.3 SECONDS (ref 12.3–15)
QRS AXIS: 3 DEGREES
QRS AXIS: 5 DEGREES
QRSD INTERVAL: 78 MS
QRSD INTERVAL: 84 MS
QT INTERVAL: 362 MS
QT INTERVAL: 402 MS
QTC INTERVAL: 427 MS
QTC INTERVAL: 433 MS
RBC # BLD AUTO: 4.55 MILLION/UL (ref 3.88–5.62)
SODIUM SERPL-SCNC: 139 MMOL/L (ref 135–147)
T WAVE AXIS: 17 DEGREES
T WAVE AXIS: 29 DEGREES
VENTRICULAR RATE: 68 BPM
VENTRICULAR RATE: 86 BPM
WBC # BLD AUTO: 7.19 THOUSAND/UL (ref 4.31–10.16)

## 2024-10-31 PROCEDURE — C1769 GUIDE WIRE: HCPCS | Performed by: INTERNAL MEDICINE

## 2024-10-31 PROCEDURE — C1898 LEAD, PMKR, OTHER THAN TRANS: HCPCS | Performed by: INTERNAL MEDICINE

## 2024-10-31 PROCEDURE — C1887 CATHETER, GUIDING: HCPCS | Performed by: INTERNAL MEDICINE

## 2024-10-31 PROCEDURE — 33286 RMVL SUBQ CAR RHYTHM MNTR: CPT | Performed by: INTERNAL MEDICINE

## 2024-10-31 PROCEDURE — 33208 INSRT HEART PM ATRIAL & VENT: CPT | Performed by: INTERNAL MEDICINE

## 2024-10-31 PROCEDURE — 76937 US GUIDE VASCULAR ACCESS: CPT | Performed by: INTERNAL MEDICINE

## 2024-10-31 PROCEDURE — 93005 ELECTROCARDIOGRAM TRACING: CPT

## 2024-10-31 PROCEDURE — 83735 ASSAY OF MAGNESIUM: CPT | Performed by: PHYSICIAN ASSISTANT

## 2024-10-31 PROCEDURE — 85027 COMPLETE CBC AUTOMATED: CPT | Performed by: PHYSICIAN ASSISTANT

## 2024-10-31 PROCEDURE — C1892 INTRO/SHEATH,FIXED,PEEL-AWAY: HCPCS | Performed by: INTERNAL MEDICINE

## 2024-10-31 PROCEDURE — 71045 X-RAY EXAM CHEST 1 VIEW: CPT

## 2024-10-31 PROCEDURE — 93010 ELECTROCARDIOGRAM REPORT: CPT | Performed by: INTERNAL MEDICINE

## 2024-10-31 PROCEDURE — 80048 BASIC METABOLIC PNL TOTAL CA: CPT | Performed by: PHYSICIAN ASSISTANT

## 2024-10-31 PROCEDURE — C1785 PMKR, DUAL, RATE-RESP: HCPCS | Performed by: INTERNAL MEDICINE

## 2024-10-31 PROCEDURE — 85610 PROTHROMBIN TIME: CPT | Performed by: PHYSICIAN ASSISTANT

## 2024-10-31 PROCEDURE — NC001 PR NO CHARGE: Performed by: PHYSICIAN ASSISTANT

## 2024-10-31 DEVICE — IPG W1DR01 AZURE XT DR MRI USA
Type: IMPLANTABLE DEVICE | Site: CHEST | Status: FUNCTIONAL
Brand: AZURE™ XT DR MRI SURESCAN™

## 2024-10-31 DEVICE — ENVELOPE CMRM6122 ABSORB MED MR
Type: IMPLANTABLE DEVICE | Site: CHEST | Status: FUNCTIONAL
Brand: TYRX™

## 2024-10-31 DEVICE — LEAD 5076-52 MRI US RCMCRD
Type: IMPLANTABLE DEVICE | Site: CHEST | Status: FUNCTIONAL
Brand: CAPSUREFIX NOVUS MRI™ SURESCAN®

## 2024-10-31 DEVICE — LEAD 3830 US MKT/ 69CM MRI LBBAP
Type: IMPLANTABLE DEVICE | Site: CHEST | Status: FUNCTIONAL
Brand: SELECTSECURE™ MRI SURESCAN™

## 2024-10-31 RX ORDER — SODIUM CHLORIDE 9 MG/ML
20 INJECTION, SOLUTION INTRAVENOUS CONTINUOUS
Status: DISCONTINUED | OUTPATIENT
Start: 2024-10-31 | End: 2024-10-31 | Stop reason: HOSPADM

## 2024-10-31 RX ORDER — ACETAMINOPHEN 325 MG/1
650 TABLET ORAL EVERY 4 HOURS PRN
Status: DISCONTINUED | OUTPATIENT
Start: 2024-10-31 | End: 2024-10-31 | Stop reason: HOSPADM

## 2024-10-31 RX ORDER — PROPOFOL 10 MG/ML
INJECTION, EMULSION INTRAVENOUS CONTINUOUS PRN
Status: DISCONTINUED | OUTPATIENT
Start: 2024-10-31 | End: 2024-10-31

## 2024-10-31 RX ORDER — LIDOCAINE HYDROCHLORIDE 10 MG/ML
INJECTION, SOLUTION EPIDURAL; INFILTRATION; INTRACAUDAL; PERINEURAL CODE/TRAUMA/SEDATION MEDICATION
Status: DISCONTINUED | OUTPATIENT
Start: 2024-10-31 | End: 2024-10-31 | Stop reason: HOSPADM

## 2024-10-31 RX ORDER — CEFAZOLIN SODIUM 2 G/50ML
2000 SOLUTION INTRAVENOUS ONCE
Status: COMPLETED | OUTPATIENT
Start: 2024-10-31 | End: 2024-10-31

## 2024-10-31 RX ADMIN — PROPOFOL 50 MG: 10 INJECTION, EMULSION INTRAVENOUS at 09:01

## 2024-10-31 RX ADMIN — Medication 5 MG: at 09:40

## 2024-10-31 RX ADMIN — PROPOFOL 50 MCG/KG/MIN: 10 INJECTION, EMULSION INTRAVENOUS at 08:59

## 2024-10-31 RX ADMIN — CEFAZOLIN SODIUM 2000 MG: 2 SOLUTION INTRAVENOUS at 08:59

## 2024-10-31 RX ADMIN — SODIUM CHLORIDE 20 ML/HR: 0.9 INJECTION, SOLUTION INTRAVENOUS at 07:19

## 2024-10-31 NOTE — ASSESSMENT & PLAN NOTE
- noted to have prolonged AV delay on EKGs with Mobitz type I on Holter monitors  - recently underwent loop recorder implantation 3 months ago showing AV block (potential for vagal)  - recommended that he undergo pacemaker implantation by Dr. Corcoran given concern for progression towards complete heart block    Patient presents today to undergo pacemaker implantation and loop recorder explantation.  If stable afterwards, can likely go home today.

## 2024-10-31 NOTE — ANESTHESIA POSTPROCEDURE EVALUATION
Post-Op Assessment Note    CV Status:  Stable  Pain Score: 0    Pain management: adequate       Mental Status:  Alert and awake   Hydration Status:  Euvolemic   PONV Controlled:  Controlled   Airway Patency:  Patent     Post Op Vitals Reviewed: Yes    No anethesia notable event occurred.            Last Filed PACU Vitals:  Vitals Value Taken Time   Temp     Pulse 75 10/31/24 1215   /66 10/31/24 1215   Resp 16 10/31/24 1030   SpO2 95 % 10/31/24 1215       Modified Rosita:  Activity: 2 (10/31/2024 10:19 AM)  Respiration: 2 (10/31/2024 10:19 AM)  Circulation: 2 (10/31/2024 10:19 AM)  Consciousness: 2 (10/31/2024 10:19 AM)  Oxygen Saturation: 2 (10/31/2024 10:19 AM)  Modified Rosita Score: 10 (10/31/2024 10:19 AM)

## 2024-10-31 NOTE — Clinical Note
The PACER GENERATOR TABITHA XT DR SUKHJINDER RIVERA - [id number] device was inserted. The leads were placed into the connector and visually verified to be in correct position. Injury current obtained.

## 2024-10-31 NOTE — ANESTHESIA PREPROCEDURE EVALUATION
Procedure:  Cardiac pacer implant DUAL CHAMBER (Chest)  Cardiac loop recorder explant (Chest)    Relevant Problems   CARDIO   (+) Benign essential hypertension   (+) First degree AV block   (+) Hypercholesterolemia      /RENAL   (+) Stage 3a chronic kidney disease (HCC)      MUSCULOSKELETAL   (+) Osteoarthritis        Physical Exam    Airway    Mallampati score: II  TM Distance: >3 FB  Neck ROM: full     Dental   No notable dental hx     Cardiovascular  Rhythm: regular, Rate: normal    Pulmonary   Breath sounds clear to auscultation    Other Findings        Anesthesia Plan  ASA Score- 3     Anesthesia Type- IV sedation with anesthesia with ASA Monitors.         Additional Monitors:     Airway Plan:            Plan Factors-Exercise tolerance (METS): >4 METS.    Chart reviewed.   Existing labs reviewed. Patient summary reviewed.    Patient is not a current smoker.      Obstructive sleep apnea risk education given perioperatively.        Induction- intravenous.    Postoperative Plan-     Perioperative Resuscitation Plan - Level 1 - Full Code.       Informed Consent- Anesthetic plan and risks discussed with patient.  I personally reviewed this patient with the CRNA. Discussed and agreed on the Anesthesia Plan with the CRNA..

## 2024-10-31 NOTE — ASSESSMENT & PLAN NOTE
Lab Results   Component Value Date    EGFR 72 10/31/2024    EGFR 76 10/16/2024    EGFR 56 03/14/2024    CREATININE 0.99 10/31/2024    CREATININE 0.95 10/16/2024    CREATININE 1.22 03/14/2024

## 2024-10-31 NOTE — ANESTHESIA POSTPROCEDURE EVALUATION
Post-Op Assessment Note    CV Status:  Stable  Pain Score: 0    Pain management: adequate       Mental Status:  Alert and awake   Hydration Status:  Euvolemic   PONV Controlled:  Controlled   Airway Patency:  Patent     Post Op Vitals Reviewed: Yes    No anethesia notable event occurred.    Staff: CRNA           Last Filed PACU Vitals:  Vitals Value Taken Time   Temp     Pulse 65    /75    Resp 18    SpO2 99% on RA        Modified Rosita:  Activity: 2 (10/31/2024 10:19 AM)  Respiration: 2 (10/31/2024 10:19 AM)  Circulation: 2 (10/31/2024 10:19 AM)  Consciousness: 2 (10/31/2024 10:19 AM)  Oxygen Saturation: 2 (10/31/2024 10:19 AM)  Modified Rosita Score: 10 (10/31/2024 10:19 AM)

## 2024-10-31 NOTE — H&P
H&P Exam - Electrophysiology  Stefan Loving 78 y.o. male MRN: [id number]  Unit/Bed#: [facility] Encounter: [id number]    Assessment & Plan     Assessment & Plan  First degree AV block  - noted to have prolonged AV delay on EKGs with Mobitz type I on Holter monitors  - recently underwent loop recorder implantation 3 months ago showing AV block (potential for vagal)  - recommended that he undergo pacemaker implantation by Dr. Corcoran given concern for progression towards complete heart block    Patient presents today to undergo pacemaker implantation and loop recorder explantation.  If stable afterwards, can likely go home today.  s/p [employer] loop recorder 7/24/2024    Benign essential hypertension  - maintained on losartan and chlorthalidone as an outpatient   Stage 3a chronic kidney disease (HCC)  Lab Results   Component Value Date    EGFR 72 10/31/2024    EGFR 76 10/16/2024    EGFR 56 03/14/2024    CREATININE 0.99 10/31/2024    CREATININE 0.95 10/16/2024    CREATININE 1.22 03/14/2024             History of Present Illness   HPI:  Stefan Loving is a 78 y.o. year old male with AV node disease with loop recorder in situ, hypertension, hyperlipidemia, and CKD stage III.    Patient has followed with general cardiology since November of last year.  Although patient did not have any symptoms when he was first evaluated, he did have Mobitz type I noted on EKG and he was ordered a Holter monitor that also showed the same.  He was therefore sent to electrophysiology at which point loop recorder implantation was recommended.  He underwent this on 7/24 and after implantation pauses have been noted.  There is a possibility of a vagal component to these however when seen back in the office by Dr. Corcoran on 9/18, was felt that he would benefit from pacemaker implantation given progression of AV suzanna disease.    Patient reports that he has not had issues with lightheadedness, dizziness, presyncope or  syncope.    EKG:         Review of Systems  ROS as noted above, otherwise 12 point review of systems was performed and is negative.     Historical Information   Past Medical History:   Diagnosis Date    Allergy     last assessed - [id number]    Arthritis     Cataracts, bilateral 09/19/2017    Had bilateral cataract surgery    Chronic right shoulder pain 01/19/2017    Dyspepsia     last assessed - [id number]    GERD (gastroesophageal reflux disease)     Heart murmur     Hyperlipidemia     Hypertension     Onychomycosis     last assessed - [id number]    Plantar fasciitis     s/p [employer] loop recorder 7/24/2024 07/24/2024     Past Surgical History:   Procedure Laterality Date    CARDIAC ELECTROPHYSIOLOGY PROCEDURE N/A 7/24/2024    Procedure: Cardiac loop recorder implant;  Surgeon: Juwan Corcoran MD;  Location[facility];  Service: Cardiology    COLONOSCOPY  2004    complete colonoscopy    NEUROPLASTY / TRANSPOSITION MEDIAN NERVE AT CARPAL TUNNEL BILATERAL  2002    ROTATOR CUFF REPAIR Bilateral     Right - Resolved - Aug 2004; Left - Resolved - Feb 2006     Family History:   Family History   Problem Relation Age of Onset    Crohn's disease Mother        Social History   Social History     Substance and Sexual Activity   Alcohol Use Yes     Social History     Substance and Sexual Activity   Drug Use No     Social History     Tobacco Use   Smoking Status Never    Passive exposure: Never   Smokeless Tobacco Never       Meds/Allergies   all medications and allergies reviewed  Home Medications:   Medications Prior to Admission:     Ascorbic Acid (VITAMIN C) 100 MG tablet    aspirin 81 MG tablet    chlorthalidone 25 mg tablet    cholecalciferol (VITAMIN D3) 1,000 units tablet    clotrimazole-betamethasone (LOTRISONE) 1-0.05 % cream    Ginkgo Biloba 40 MG TABS    glucosamine 500 MG CAPS capsule    losartan (COZAAR) 50 mg tablet    Multiple Vitamins-Minerals (MULTIVITAMIN WITH MINERALS) tablet    Omega-3 Fatty  "Acids (FISH OIL) 1,000 mg    potassium chloride (K-DUR,KLOR-CON) 20 mEq tablet    RESTASIS 0.05 % ophthalmic emulsion    rosuvastatin (CRESTOR) 40 MG tablet    No Known Allergies    Objective   Vitals: Blood pressure (!) 185/84, pulse 92, temperature 97.5 °F (36.4 °C), temperature source Oral, resp. rate 18, height 5' 7\" (1.702 m), weight 95.3 kg (210 lb), SpO2 96%.  Orthostatic Blood Pressures      Flowsheet Row Most Recent Value   Blood Pressure 185/84 filed at 10/31/2024 0738   Patient Position - Orthostatic VS Lying filed at 10/31/2024 0738            No intake or output data in the 24 hours ending 10/31/24 0916    Invasive Devices       Peripheral Intravenous Line  Duration             Peripheral IV 10/31/24 Dorsal (posterior);Left Hand <1 day                    Physical Exam   GEN: NAD, alert and oriented, well appearing  SKIN: dry without significant lesions or rashes  HEENT: NCAT, PERRL, EOMs intact  NECK: No JVD or carotid bruits appreciated  CARDIOVASCULAR: RRR, normal S1, S2 without murmurs, rubs, or gallops appreciated  LUNGS: Clear to auscultation bilaterally without wheezes, rhonchi, or rales  ABDOMEN: Soft, nontender, nondistended  EXTREMITIES/VASCULAR: perfused without clubbing, cyanosis, or edema b/l  PSYCH: Normal mood and affect  NEURO: CN ll-Xll grossly intact    Lab Results: I have personally reviewed pertinent lab results.    Results from last 7 days   Lab Units 10/31/24  0720   WBC Thousand/uL 7.19   HEMOGLOBIN g/dL 14.1   HEMATOCRIT % 41.2   PLATELETS Thousands/uL 253     Results from last 7 days   Lab Units 10/31/24  0720   POTASSIUM mmol/L 3.8   CHLORIDE mmol/L 103   CO2 mmol/L 27   BUN mg/dL 20   CREATININE mg/dL 0.99   CALCIUM mg/dL 9.7     Results from last 7 days   Lab Units 10/31/24  0720   INR  0.98     Results from last 7 days   Lab Units 10/31/24  0720   MAGNESIUM mg/dL 2.0         Imaging: Results Review Statement: I personally reviewed the following image studies/reports in PACS " and discussed pertinent findings with Radiology:  . My interpretation of the radiology images/reports is:  .  No results found for this or any previous visit.      Code Status: Level 1 - Full Code    ** Please Note: Dictation voice to text software may have been used in the creation of this document. **

## 2024-10-31 NOTE — DISCHARGE INSTR - AVS FIRST PAGE
LOOP RECORDER REMOVAL:  Do not use lotions/powders/creams on incision. Remove outer bandage 48 hours after procedure - if present, leave suture in place and they will be removed at 2 week follow up appointment. Please keep incision dry for the first first week - either keeping incision out of direct shower water or placing over the counter waterproof bandages over top when showering. Please call the office [phone] if you notice redness, swelling, bleeding, or drainage from incision or if you develop fevers.    PACEMAKER INSTRUCTIONS:  Please refer to post pacemaker implantation discharge instructions and restrictions and your pacemaker booklet/temporary card.     Keep incision dry for one week. Leave outer bandage in place for 1 week - it is water proof, and as long as it is fully adhered to your skin you may shower with it.  If it appears as though the bandage is coming off and/or there is any communication to the area of device incision, please then keep the whole area dry for the remaining week.  After 1 week, please remove by pulling all edges away from the center of the bandage. After the bandage is removed, you may then shower normally and get the area wet with soap and water, no scrubbing, and pat dry. Do not use lotions/powders/creams on incision.    No overhead reaching/pushing/pulling/lifting greater than 5-10lbs with left arm for six weeks. Please call the office if you notice redness, swelling, bleeding, or drainage from incision or if you develop fevers.       AFTER PACEMAKER CARE:    If you have any questions, please call [phone] to speak with a nurse (8:30am-4pm, or [phone] after hours). For appointments, please call [phone].      WHAT YOU SHOULD KNOW:   A pacemaker is a small, battery-powered device that is placed under your skin in your upper chest area with wires placed through a vein that lead directly into the heart. It helps regulate your heart rate and prevent your heart  from beating too slowly.                 AFTER YOU LEAVE:     Medicines:     Pain medicine:  You may need medicine to take away or decrease pain.     Learn how to take your medicine. Ask what medicine and how much you should take. Be sure you know how, when, and how often to take it. Usually Over the counter pain medicine is sufficient to control pain (Acetominophen or Ibuprofen) Ask your doctor if you may take these. If this does not control your pain, narcotic pain killers may be prescribed, please call if you need prescription.     Do not wait until the pain is severe before you take your medicine. Tell caregivers if your pain does not decrease.    Pain medicine can make you dizzy or sleepy. Prevent falls by calling someone when you get out of bed or if you need help.        Take your medicine as directed.  Call your healthcare provider if you think your medicine is not helping or if you have side effects. Tell him if you are allergic to any medicine.    Follow up with your cardiologist after your procedure:  You will need a follow-up visit approximately 2 weeks after you leave the hospital. Your cardiologist will check your wound and make sure that your pacemaker is working correctly.     Follow the instructions to check your pacemaker:  Your cardiologist or primary healthcare provider will check your pacemaker and the battery regularly.  He will use a computer to check your pacemaker over the telephone or wireless device which will be given to you.     Pacemaker batteries usually last 8 to 10 years. The pacemaker unit will be replaced when the battery gets low. This is a simpler procedure than the original one to implant your pacemaker.    Wound care:  Keep your incision dry for one week.  Do not use lotions/powders/creams on incision.     Leave outer bandage in place for 1 week - it is water proof, and as long as it is fully adhered to your skin you may shower with it.  If it appears as though the bandage is  coming off and/or there is any communication to the area of device incision, please then keep the whole area dry for the remaining week.  After 1 week, please remove by pulling all edges away from the center of the bandage.    Please call the office if you notice redness, swelling, bleeding, or drainage from incision or if you develop fevers.       Activity:   Arm movement and lifting:  Be careful using the arm on the side of your pacemaker. Do not move your arm for the first 24 hours after your procedure. Do not  lift your arm above your shoulder or lift more than 10 pounds for one month after your procedure. Avoid pushing, pulling, or repetitive arm movements for 6 weeks. This helps the leads stay in place and helps your wound heal. Ask your caregiver when you can drive after your procedure. You may move your arm side to side without lifting above your shoulder, and do not need to wear a sling at home.   Driving: you are ok to drive 48 hours after pacemaker is implanted   Sports:  Ask your caregiver when it is okay to play tennis, golf, basketball, or any sport that requires you to lift your arms. Do not play full contact sports, such as football, that could damage your pacemaker. Ask your cardiologist or primary healthcare provider how much and what kinds of physical activity are safe for you.    Living with a pacemaker:   Tell all caregivers you have a pacemaker:  This includes surgeons, radiologists, and medical technicians. You may want to wear a medical alert ID bracelet or necklace that states that you have a pacemaker.    Carry your pacemaker ID card:  Make sure you receive a pacemaker ID card. Carry it with you at all times. It lists important information about your pacemaker. Show it to airport security if you travel.     Avoid electrical interference:  Avoid welding equipment and other equipment with large magnets or electric fields. These things could interfere with how your pacemaker works. Use your  cell phone on the ear opposite from your pacemaker. Do not carry your cell phone in your shirt pocket over your chest.     Some Pacemakers are MRI safe. Ask you doctor if it is safe to proceed with MRI and let the radiologist and staff know you have a pacemaker.     Do not touch the skin around your pacemaker:  This can cause damage to the lead wires or move the pacemaker unit from where it should be.    Contact your cardiologist or primary healthcare provider if:   The area around your pacemaker has increasing amount of pain after surgery. The pain should improve over first few days after implantation.     The skin around your stitches has increasing redness, swelling, or has drainage. This may mean that you have an infection.     You have a fever.     You have chills, a cough, and feel weak or achy. These are also signs of infection.    Your feet or ankles are more swollen than your baseline.     Your Heart rate is less than 50 beats per minute     Seek care immediately if:   Your bandage becomes soaked with blood.     Your pacemaker is swelling rapidly    Your stitches open up.     You feel your heart suddenly beating very slowly or quickly.    You become too weak or dizzy to stand, or you pass out.     Your arm or leg feels warm, tender, and painful. It may look swollen and red.    You have chest pain that does not go away with rest or medicine.     You feel lightheaded, short of breath, and have chest pain.     You cough up blood.        © 2014 [employer]. Information is for End User's use only and may not be sold, redistributed or otherwise used for commercial purposes. All illustrations and images included in CareNotes® are the copyrighted property of [employer] or [employer].  The above information is an  only. It is not intended as medical advice for individual conditions or treatments. Talk to your doctor, nurse or pharmacist before following any medical  regimen to see if it is safe and effective for you.

## 2024-11-06 ENCOUNTER — OFFICE VISIT (OUTPATIENT)
Dept: FAMILY MEDICINE CLINIC | Facility: CLINIC | Age: 79
End: 2024-11-06
Payer: MEDICARE

## 2024-11-06 VITALS
RESPIRATION RATE: 18 BRPM | TEMPERATURE: 98 F | WEIGHT: 212 LBS | HEIGHT: 67 IN | BODY MASS INDEX: 33.27 KG/M2 | DIASTOLIC BLOOD PRESSURE: 74 MMHG | OXYGEN SATURATION: 96 % | SYSTOLIC BLOOD PRESSURE: 114 MMHG | HEART RATE: 53 BPM

## 2024-11-06 DIAGNOSIS — Z95.0 PACEMAKER: ICD-10-CM

## 2024-11-06 DIAGNOSIS — Z12.5 PROSTATE CANCER SCREENING: ICD-10-CM

## 2024-11-06 DIAGNOSIS — I44.0 FIRST DEGREE AV BLOCK: Chronic | ICD-10-CM

## 2024-11-06 DIAGNOSIS — I10 BENIGN ESSENTIAL HYPERTENSION: Primary | Chronic | ICD-10-CM

## 2024-11-06 DIAGNOSIS — I77.1 SUBCLAVIAN ARTERY STENOSIS, LEFT (HCC): ICD-10-CM

## 2024-11-06 DIAGNOSIS — E87.6 HYPOKALEMIA: ICD-10-CM

## 2024-11-06 DIAGNOSIS — N18.31 STAGE 3A CHRONIC KIDNEY DISEASE (HCC): Chronic | ICD-10-CM

## 2024-11-06 DIAGNOSIS — I65.23 CAROTID STENOSIS, ASYMPTOMATIC, BILATERAL: Chronic | ICD-10-CM

## 2024-11-06 DIAGNOSIS — E78.00 HYPERCHOLESTEROLEMIA: ICD-10-CM

## 2024-11-06 PROBLEM — Z98.890 HISTORY OF LOOP RECORDER: Status: RESOLVED | Noted: 2024-07-24 | Resolved: 2024-11-06

## 2024-11-06 PROCEDURE — 99214 OFFICE O/P EST MOD 30 MIN: CPT | Performed by: NURSE PRACTITIONER

## 2024-11-06 PROCEDURE — G2211 COMPLEX E/M VISIT ADD ON: HCPCS | Performed by: NURSE PRACTITIONER

## 2024-11-06 NOTE — ASSESSMENT & PLAN NOTE
Blood pressure in the office today is 114/74.  He continues on his chlorthalidone 25 mg daily and losartan 50 mg daily.  He continues to follow with cardiology.    Orders:    CBC and differential; Future

## 2024-11-06 NOTE — ASSESSMENT & PLAN NOTE
He continues to follow with vascular.  Recent duplex studies performed.  He will follow-up with vascular in 2 years.  -CV duplex showed mild bilateral ICA less than 50% stenosis, vertebral flow is antegrade, no significant subclavian artery stenosis noted  -Continue aspirin and rosuvastatin  -Blood pressure well-controlled  -Repeat duplex in 2 years  -Follow-up in the office in 2 years

## 2024-11-06 NOTE — ASSESSMENT & PLAN NOTE
Patient's cholesterol levels from 3/14/2024 was reviewed with him.  He continues on his omega-3 fatty acids, rosuvastatin.  Lipid panel ordered for the patient to have done prior to his next visit.  Component      Latest Ref Rng 3/14/2024   Cholesterol      See Comment mg/dL 145    Triglycerides      See Comment mg/dL 100    HDL      >=40 mg/dL 61    LDL Calculated      0 - 100 mg/dL 64          The 10-year ASCVD risk score (Hayder TAYLOR, et al., 2019) is: 25.2%    Values used to calculate the score:      Age: 78 years      Sex: Male      Is Non- : No      Diabetic: No      Tobacco smoker: No      Systolic Blood Pressure: 114 mmHg      Is BP treated: Yes      HDL Cholesterol: 61 mg/dL      Total Cholesterol: 145 mg/dL        Orders:    Lipid Panel with Direct LDL reflex; Future

## 2024-11-06 NOTE — ASSESSMENT & PLAN NOTE
Recent pacemaker placed on October 31 for heart block.  Patient feels well.  He will remove the dressing tomorrow.  He has a follow-up appointment with cardiology next week.  No symptoms of infection.  No redness surrounding the dressing in office today

## 2024-11-06 NOTE — PROGRESS NOTES
Ambulatory Visit  Name: Stefan Loving      : 1945      MRN: [id number]  Encounter Provider: VEGA Sauer  Encounter Date: 2024   Encounter department: [facility]    Assessment & Plan  Benign essential hypertension  Blood pressure in the office today is 114/74.  He continues on his chlorthalidone 25 mg daily and losartan 50 mg daily.  He continues to follow with cardiology.    Orders:    CBC and differential; Future    Stage 3a chronic kidney disease (HCC)  Lab Results   Component Value Date    EGFR 72 10/31/2024    EGFR 76 10/16/2024    EGFR 56 2024    CREATININE 0.99 10/31/2024    CREATININE 0.95 10/16/2024    CREATININE 1.22 2024   Kidney function stable.  Watch salt and NSAIDs in diet.    Orders:    Comprehensive metabolic panel; Future    Hypercholesterolemia  Patient's cholesterol levels from 3/14/2024 was reviewed with him.  He continues on his omega-3 fatty acids, rosuvastatin.  Lipid panel ordered for the patient to have done prior to his next visit.  Component      Latest Ref Rng 3/14/2024   Cholesterol      See Comment mg/dL 145    Triglycerides      See Comment mg/dL 100    HDL      >=40 mg/dL 61    LDL Calculated      0 - 100 mg/dL 64          The 10-year ASCVD risk score (Hayder DK, et al., 2019) is: 25.2%    Values used to calculate the score:      Age: 78 years      Sex: Male      Is Non- : No      Diabetic: No      Tobacco smoker: No      Systolic Blood Pressure: 114 mmHg      Is BP treated: Yes      HDL Cholesterol: 61 mg/dL      Total Cholesterol: 145 mg/dL        Orders:    Lipid Panel with Direct LDL reflex; Future    Hypokalemia    Orders:    Comprehensive metabolic panel; Future    Prostate cancer screening    Orders:    PSA, Total Screen; Future    Carotid stenosis, asymptomatic, bilateral  He continues to follow with vascular.  Recent duplex studies performed.  He will follow-up with vascular in 2 years.  -CV  duplex showed mild bilateral ICA less than 50% stenosis, vertebral flow is antegrade, no significant subclavian artery stenosis noted  -Continue aspirin and rosuvastatin  -Blood pressure well-controlled  -Repeat duplex in 2 years  -Follow-up in the office in 2 years         First degree AV block  Patient following with cardiology.  Recent placement of pacemaker.         Subclavian artery stenosis, left (HCC)  Patient follows with vascular.  Subclavian artery stenosis not visualized on most recent duplex study.  Carotid duplex to be repeated in 2 years.         BMI 33.0-33.9,adult  Lifestyle modifications discussed.  Patient is quite active.         Pacemaker  Recent pacemaker placed on October 31 for heart block.  Patient feels well.  He will remove the dressing tomorrow.  He has a follow-up appointment with cardiology next week.  No symptoms of infection.  No redness surrounding the dressing in office today           Depression Screening and Follow-up Plan: Patient was screened for depression during today's encounter. They screened negative with a PHQ-2 score of 0.      History of Present Illness     Stefan presents to the office today for follow-up.  Overall he feels well.  He did have a pacemaker placed on October 31 for heart block as well as episodes of bradycardia at night.  He continues to watch his diet.  He is not adding salt to his diet.  He is sleeping well.  He does wear hearing aids.  He did receive his COVID and flu vaccines this year.  He continues to follow with vascular and cardiology.  He continues to exercise several times per week.          History obtained from : patient  Review of Systems   Constitutional: Negative.  Negative for fatigue.   HENT: Negative.  Negative for congestion, postnasal drip, rhinorrhea and trouble swallowing.    Eyes: Negative.  Negative for visual disturbance.   Respiratory: Negative.  Negative for choking and shortness of breath.    Cardiovascular: Negative.  Negative  for chest pain.   Gastrointestinal: Negative.    Endocrine: Negative.    Genitourinary: Negative.    Musculoskeletal: Negative.  Negative for arthralgias, back pain, myalgias and neck pain.   Skin: Negative.    Neurological:  Negative for dizziness and headaches.   Psychiatric/Behavioral: Negative.       Current Outpatient Medications on File Prior to Visit   Medication Sig Dispense Refill    Ascorbic Acid (VITAMIN C) 100 MG tablet Take 100 mg by mouth daily      aspirin 81 MG tablet Take 1 tablet by mouth daily      chlorthalidone 25 mg tablet TAKE 1 TABLET BY MOUTH DAILY 90 tablet 1    cholecalciferol (VITAMIN D3) 1,000 units tablet Take 500 Units by mouth daily      clotrimazole-betamethasone (LOTRISONE) 1-0.05 % cream APPLY TOPICALLY 2 TIMES A DAY AS NEEDED FOR RASH 30 g 2    Ginkgo Biloba 40 MG TABS Take by mouth in the morning      glucosamine 500 MG CAPS capsule Take 500 mg by mouth      losartan (COZAAR) 50 mg tablet TAKE 1 TABLET BY MOUTH DAILY 90 tablet 3    Multiple Vitamins-Minerals (MULTIVITAMIN WITH MINERALS) tablet Take 1 tablet by mouth daily      Omega-3 Fatty Acids (FISH OIL) 1,000 mg Take 1,000 mg by mouth daily      potassium chloride (K-DUR,KLOR-CON) 20 mEq tablet TAKE 1 TABLET BY MOUTH  TWICE DAILY (Patient taking differently: 20 mEq daily) 180 tablet 3    RESTASIS 0.05 % ophthalmic emulsion INSTILL 1 DROP BY OPHTHALMIC ROUTE 2 TIMES EVERY DAY INTO BOTH EYES AS NEEDED  4    rosuvastatin (CRESTOR) 40 MG tablet Take 1 tablet (40 mg total) by mouth daily 90 tablet 1    [DISCONTINUED] Iodoquinol-HC (HYDROCORTISONE-IODOQUINOL) 1-1 % CREA Apply topically 2 (two) times a day as needed (rash and itcing) 1 Tube 2     No current facility-administered medications on file prior to visit.      Social History     Tobacco Use    Smoking status: Never     Passive exposure: Never    Smokeless tobacco: Never   Vaping Use    Vaping status: Never Used   Substance and Sexual Activity    Alcohol use: Yes    Drug  "use: No    Sexual activity: Not Currently     Birth control/protection: Abstinence         Objective     /74   Pulse (!) 53   Temp 98 °F (36.7 °C) (Tympanic)   Resp 18   Ht 5' 7\" (1.702 m)   Wt 96.2 kg (212 lb)   SpO2 96%   BMI 33.20 kg/m²     Physical Exam  Vitals reviewed.   Constitutional:       Appearance: Normal appearance. He is obese.   HENT:      Head: Normocephalic and atraumatic.      Nose: Nose normal.      Mouth/Throat:      Mouth: Mucous membranes are moist.   Eyes:      Extraocular Movements: Extraocular movements intact.      Pupils: Pupils are equal, round, and reactive to light.   Cardiovascular:      Rate and Rhythm: Normal rate and regular rhythm.      Pulses: Normal pulses.      Heart sounds: Normal heart sounds.   Pulmonary:      Effort: Pulmonary effort is normal.      Breath sounds: Normal breath sounds.   Musculoskeletal:         General: Normal range of motion.   Skin:     General: Skin is warm.          Neurological:      General: No focal deficit present.      Mental Status: He is alert and oriented to person, place, and time. Mental status is at baseline.   Psychiatric:         Mood and Affect: Mood normal.         Behavior: Behavior normal.         Thought Content: Thought content normal.         Judgment: Judgment normal.         "

## 2024-11-06 NOTE — ASSESSMENT & PLAN NOTE
Patient follows with vascular.  Subclavian artery stenosis not visualized on most recent duplex study.  Carotid duplex to be repeated in 2 years.

## 2024-11-10 DIAGNOSIS — I10 ESSENTIAL HYPERTENSION: ICD-10-CM

## 2024-11-11 RX ORDER — CHLORTHALIDONE 25 MG/1
25 TABLET ORAL DAILY
Qty: 90 TABLET | Refills: 3 | Status: SHIPPED | OUTPATIENT
Start: 2024-11-11

## 2024-11-14 ENCOUNTER — RESULTS FOLLOW-UP (OUTPATIENT)
Dept: CARDIOLOGY CLINIC | Facility: CLINIC | Age: 79
End: 2024-11-14

## 2024-11-14 ENCOUNTER — IN-CLINIC DEVICE VISIT (OUTPATIENT)
Dept: CARDIOLOGY CLINIC | Facility: CLINIC | Age: 79
End: 2024-11-14
Payer: MEDICARE

## 2024-11-14 DIAGNOSIS — Z95.0 PRESENCE OF CARDIAC PACEMAKER: Primary | ICD-10-CM

## 2024-11-14 PROCEDURE — 93280 PM DEVICE PROGR EVAL DUAL: CPT | Performed by: INTERNAL MEDICINE

## 2024-11-14 NOTE — PROGRESS NOTES
Results for orders placed or performed in visit on 11/14/24   Cardiac EP device report    Narrative    MDT DUAL PM/ACTIVE SYSTEM IS MRI CONDITIONAL  DEVICE INTERROGATED IN THE [place] OFFICE. WOUND CHECK: INCISION CLEAN AND DRY WITH EDGES APPROXIMATED; SUTURES REMOVED; WOUND CARE AND RESTRICTIONS REVIEWED WITH PATIENT AND FAMILY (PIC/MEDIA TAB). BATTERY VOLTAGE ADEQUATE (10.9 YRS). AP 69.2%.  87.8%. (AAIR-DDDR 60 PPM). ALL LEAD PARAMETERS WITHIN NORMAL LIMITS. 1 EPISODE OF NSVT (5 @ 176). 5 EPISODES OF AT/AF, LONGEST @ 12 MIN. AT/AF BURDEN 1.1%. PATIENT IS ON ASA, TASK FROM DR. YOUSIF TO START AC; RAHUL SANTOS PROVIDED ELIQUIS SAMPLE. NO EF IN CHART. CARELINK PATIENT AT/AF ALERT TIME DECREASED TO 0.5 H PER [facility] PROTOCOL. NORMAL DEVICE FUNCTION. [facility]/GV

## 2024-11-15 ENCOUNTER — TELEPHONE (OUTPATIENT)
Dept: CARDIOLOGY CLINIC | Facility: CLINIC | Age: 79
End: 2024-11-15

## 2024-11-15 ENCOUNTER — TELEPHONE (OUTPATIENT)
Age: 79
End: 2024-11-15

## 2024-11-15 DIAGNOSIS — I48.0 PAROXYSMAL ATRIAL FIBRILLATION (HCC): Primary | ICD-10-CM

## 2024-11-15 NOTE — TELEPHONE ENCOUNTER
----- Message from Juwan Corcoran MD sent at 11/14/2024  6:36 PM EST -----  Can you guys call him and let him know I would like to start him on anticoagulation with Eliquis?  Eliquis 5 mg twice daily.  If he is agreeable lets send in a prescription.  Also can we price out Eliquis and Xarelto.    Thank you  ----- Message -----  From: Interface, [place] Results Incoming  Sent: 11/14/2024   3:04 PM EST  To: Juwan Corcoran MD

## 2024-11-15 NOTE — TELEPHONE ENCOUNTER
Called pt to informed about Eliquis 5 mg prescription that was sent to his pharmacy and also as per Dr Corcoran for him to stop taking aspirin and start Eliquis pt verbalized understanding.

## 2024-11-15 NOTE — TELEPHONE ENCOUNTER
----- Message from Juwan Corcoran MD sent at 11/15/2024  2:20 PM EST -----  Sent in Eliquis 5 mg twice daily. Can you tell him to stop aspirin and start Eliquis? thx  ----- Message -----  From: Cristal Norman MA  Sent: 11/15/2024  11:05 AM EST  To: Juwan Corcoran MD

## 2024-11-15 NOTE — TELEPHONE ENCOUNTER
The patient is s/p PPM implant 10/31/24 for AV block with Dr. Corcoran.     Pt called related to his new Eliquis 5mg BID prescription.  The patient was provided 2 weeks of samples 5mg tablets and a free 30 day supply card yesterday at his device check. The patient was calling because he needs a prescription sent to the pharmacy to activate his 30 day free card.. I can not find who prescribed the Eliquis as the medication is not on his medication list . Please review and advise.

## 2024-11-15 NOTE — TELEPHONE ENCOUNTER
LVM to call back the office to discuss device report   Medication teaching and assessment/Rehabilitation services

## 2024-11-18 ENCOUNTER — TELEPHONE (OUTPATIENT)
Dept: CARDIOLOGY CLINIC | Facility: CLINIC | Age: 79
End: 2024-11-18

## 2024-11-18 NOTE — TELEPHONE ENCOUNTER
I saw that you were attached to last message.  Is the Eliquis affordable to patient or do we still need to check pricing on other blood thinners?

## 2024-11-18 NOTE — TELEPHONE ENCOUNTER
----- Message from Kingsley Gallegos PA-C sent at 11/14/2024 12:52 PM EST -----  Latest device interrogation showing some afib. Per message from Dr. Corcoran would prefer AC started given [id number] of 3. Was unable to contact patient via phone.    He'll be in office today for a device check and will provide him with Eliquis samples should he be agreeable to start AC.     Can we please price check Eliquis 5mg BID, Xarelto 20mg BID, and pradaxa 150mg BID to see which of these oral AC options are affordable for him?    Thanks!

## 2024-11-20 NOTE — TELEPHONE ENCOUNTER
Left message for patient to call office back with the ID # on his prescription card and a phone number so we can call to check pricing or we can give him the names/doses on all 3 AC meds and he can certainly check on pricing.

## 2024-11-21 DIAGNOSIS — I48.0 PAROXYSMAL ATRIAL FIBRILLATION (HCC): ICD-10-CM

## 2024-11-21 NOTE — TELEPHONE ENCOUNTER
Pt returned call and stated he picked up the 60 day supply Rx of Eliquis 5mg BID and it was $47. He is comfortable with the price and would like to continue taking this med.

## 2024-12-15 DIAGNOSIS — I10 BENIGN ESSENTIAL HYPERTENSION: ICD-10-CM

## 2024-12-15 DIAGNOSIS — E87.6 HYPOKALEMIA: ICD-10-CM

## 2024-12-17 RX ORDER — POTASSIUM CHLORIDE 1500 MG/1
20 TABLET, EXTENDED RELEASE ORAL 2 TIMES DAILY
Qty: 180 TABLET | Refills: 1 | Status: SHIPPED | OUTPATIENT
Start: 2024-12-17

## 2025-01-20 DIAGNOSIS — I10 BENIGN ESSENTIAL HYPERTENSION: ICD-10-CM

## 2025-01-20 RX ORDER — LOSARTAN POTASSIUM 50 MG/1
50 TABLET ORAL DAILY
Qty: 90 TABLET | Refills: 3 | Status: SHIPPED | OUTPATIENT
Start: 2025-01-20

## 2025-01-28 DIAGNOSIS — I48.0 PAROXYSMAL ATRIAL FIBRILLATION (HCC): ICD-10-CM

## 2025-01-28 NOTE — TELEPHONE ENCOUNTER
Samples of Eliquis was given to the patient, quantity 2 boxes, Lot Number [id number]. The following was discussed with the patient as indicated: administration, storage, potential interactions, side effects.

## 2025-02-17 ENCOUNTER — IN-CLINIC DEVICE VISIT (OUTPATIENT)
Dept: CARDIOLOGY CLINIC | Facility: CLINIC | Age: 80
End: 2025-02-17
Payer: MEDICARE

## 2025-02-17 DIAGNOSIS — Z95.0 CARDIAC PACEMAKER IN SITU: Primary | ICD-10-CM

## 2025-02-17 PROCEDURE — 93280 PM DEVICE PROGR EVAL DUAL: CPT | Performed by: INTERNAL MEDICINE

## 2025-02-17 NOTE — PROGRESS NOTES
Results for orders placed or performed in visit on 02/17/25   Cardiac EP device report    Narrative    MDT DUAL PM/ACTIVE SYSTEM IS MRI CONDITIONAL  DEVICE INTERROGATED IN THE [place] OFFICE. BATTERY VOLTAGE ADEQUATE (12 YRS). AP-74%, -90% (>40% MVPR@60PPM). ALL LEAD PARAMETERS WITHIN NORMAL LIMITS. 101 AF EPISODES SINCE 11/14/24 MAX DURATION 26 MINS. AF BURDEN-0.2%. PT ON ELIQUIS. NORMAL DEVICE FUNCTION. GV

## 2025-02-18 ENCOUNTER — RESULTS FOLLOW-UP (OUTPATIENT)
Dept: CARDIOLOGY CLINIC | Facility: CLINIC | Age: 80
End: 2025-02-18

## 2025-04-01 ENCOUNTER — APPOINTMENT (OUTPATIENT)
Dept: LAB | Facility: CLINIC | Age: 80
End: 2025-04-01
Payer: MEDICARE

## 2025-04-01 DIAGNOSIS — Z12.5 PROSTATE CANCER SCREENING: ICD-10-CM

## 2025-04-01 DIAGNOSIS — E87.6 HYPOKALEMIA: ICD-10-CM

## 2025-04-01 DIAGNOSIS — N18.31 STAGE 3A CHRONIC KIDNEY DISEASE (HCC): Chronic | ICD-10-CM

## 2025-04-01 DIAGNOSIS — I10 BENIGN ESSENTIAL HYPERTENSION: Chronic | ICD-10-CM

## 2025-04-01 DIAGNOSIS — E78.00 HYPERCHOLESTEROLEMIA: ICD-10-CM

## 2025-04-01 LAB
ALBUMIN SERPL BCG-MCNC: 4.7 G/DL (ref 3.5–5)
ALP SERPL-CCNC: 52 U/L (ref 34–104)
ALT SERPL W P-5'-P-CCNC: 19 U/L (ref 7–52)
ANION GAP SERPL CALCULATED.3IONS-SCNC: 8 MMOL/L (ref 4–13)
AST SERPL W P-5'-P-CCNC: 21 U/L (ref 13–39)
BASOPHILS # BLD AUTO: 0.04 THOUSANDS/ÂΜL (ref 0–0.1)
BASOPHILS NFR BLD AUTO: 1 % (ref 0–1)
BILIRUB SERPL-MCNC: 0.77 MG/DL (ref 0.2–1)
BUN SERPL-MCNC: 19 MG/DL (ref 5–25)
CALCIUM SERPL-MCNC: 10.1 MG/DL (ref 8.4–10.2)
CHLORIDE SERPL-SCNC: 100 MMOL/L (ref 96–108)
CHOLEST SERPL-MCNC: 142 MG/DL (ref ?–200)
CO2 SERPL-SCNC: 32 MMOL/L (ref 21–32)
CREAT SERPL-MCNC: 1.05 MG/DL (ref 0.6–1.3)
EOSINOPHIL # BLD AUTO: 0.32 THOUSAND/ÂΜL (ref 0–0.61)
EOSINOPHIL NFR BLD AUTO: 5 % (ref 0–6)
ERYTHROCYTE [DISTWIDTH] IN BLOOD BY AUTOMATED COUNT: 11.6 % (ref 11.6–15.1)
GFR SERPL CREATININE-BSD FRML MDRD: 67 ML/MIN/1.73SQ M
GLUCOSE P FAST SERPL-MCNC: 110 MG/DL (ref 65–99)
HCT VFR BLD AUTO: 40.4 % (ref 36.5–49.3)
HDLC SERPL-MCNC: 55 MG/DL
HGB BLD-MCNC: 13.8 G/DL (ref 12–17)
IMM GRANULOCYTES # BLD AUTO: 0.01 THOUSAND/UL (ref 0–0.2)
IMM GRANULOCYTES NFR BLD AUTO: 0 % (ref 0–2)
LDLC SERPL CALC-MCNC: 61 MG/DL (ref 0–100)
LYMPHOCYTES # BLD AUTO: 1.02 THOUSANDS/ÂΜL (ref 0.6–4.47)
LYMPHOCYTES NFR BLD AUTO: 17 % (ref 14–44)
MCH RBC QN AUTO: 31.2 PG (ref 26.8–34.3)
MCHC RBC AUTO-ENTMCNC: 34.2 G/DL (ref 31.4–37.4)
MCV RBC AUTO: 91 FL (ref 82–98)
MONOCYTES # BLD AUTO: 0.51 THOUSAND/ÂΜL (ref 0.17–1.22)
MONOCYTES NFR BLD AUTO: 8 % (ref 4–12)
NEUTROPHILS # BLD AUTO: 4.14 THOUSANDS/ÂΜL (ref 1.85–7.62)
NEUTS SEG NFR BLD AUTO: 69 % (ref 43–75)
NRBC BLD AUTO-RTO: 0 /100 WBCS
PLATELET # BLD AUTO: 217 THOUSANDS/UL (ref 149–390)
PMV BLD AUTO: 9.3 FL (ref 8.9–12.7)
POTASSIUM SERPL-SCNC: 4.3 MMOL/L (ref 3.5–5.3)
PROT SERPL-MCNC: 7.4 G/DL (ref 6.4–8.4)
PSA SERPL-MCNC: 3.77 NG/ML (ref 0–4)
RBC # BLD AUTO: 4.43 MILLION/UL (ref 3.88–5.62)
SODIUM SERPL-SCNC: 140 MMOL/L (ref 135–147)
TRIGL SERPL-MCNC: 131 MG/DL (ref ?–150)
WBC # BLD AUTO: 6.04 THOUSAND/UL (ref 4.31–10.16)

## 2025-04-01 PROCEDURE — 80053 COMPREHEN METABOLIC PANEL: CPT

## 2025-04-01 PROCEDURE — 80061 LIPID PANEL: CPT

## 2025-04-01 PROCEDURE — 85025 COMPLETE CBC W/AUTO DIFF WBC: CPT

## 2025-04-01 PROCEDURE — G0103 PSA SCREENING: HCPCS

## 2025-04-01 PROCEDURE — 36415 COLL VENOUS BLD VENIPUNCTURE: CPT

## 2025-04-14 DIAGNOSIS — I44.1 AV BLOCK, MOBITZ 1: ICD-10-CM

## 2025-04-14 DIAGNOSIS — I65.23 CAROTID STENOSIS, ASYMPTOMATIC, BILATERAL: Chronic | ICD-10-CM

## 2025-04-15 RX ORDER — ROSUVASTATIN CALCIUM 40 MG/1
40 TABLET, COATED ORAL DAILY
Qty: 90 TABLET | Refills: 1 | Status: SHIPPED | OUTPATIENT
Start: 2025-04-15

## 2025-05-07 ENCOUNTER — OFFICE VISIT (OUTPATIENT)
Dept: FAMILY MEDICINE CLINIC | Facility: CLINIC | Age: 80
End: 2025-05-07
Payer: MEDICARE

## 2025-05-07 VITALS
OXYGEN SATURATION: 97 % | RESPIRATION RATE: 18 BRPM | DIASTOLIC BLOOD PRESSURE: 68 MMHG | WEIGHT: 216 LBS | BODY MASS INDEX: 33.9 KG/M2 | TEMPERATURE: 97.6 F | SYSTOLIC BLOOD PRESSURE: 132 MMHG | HEART RATE: 94 BPM | HEIGHT: 67 IN

## 2025-05-07 DIAGNOSIS — Z95.0 PACEMAKER: ICD-10-CM

## 2025-05-07 DIAGNOSIS — I44.0 FIRST DEGREE AV BLOCK: Chronic | ICD-10-CM

## 2025-05-07 DIAGNOSIS — E78.00 HYPERCHOLESTEROLEMIA: ICD-10-CM

## 2025-05-07 DIAGNOSIS — I65.23 CAROTID STENOSIS, ASYMPTOMATIC, BILATERAL: Chronic | ICD-10-CM

## 2025-05-07 DIAGNOSIS — E87.6 HYPOKALEMIA: ICD-10-CM

## 2025-05-07 DIAGNOSIS — I10 BENIGN ESSENTIAL HYPERTENSION: Chronic | ICD-10-CM

## 2025-05-07 DIAGNOSIS — Z00.00 MEDICARE ANNUAL WELLNESS VISIT, SUBSEQUENT: Primary | ICD-10-CM

## 2025-05-07 DIAGNOSIS — N18.31 STAGE 3A CHRONIC KIDNEY DISEASE (HCC): Chronic | ICD-10-CM

## 2025-05-07 PROBLEM — I77.1 SUBCLAVIAN ARTERY STENOSIS, LEFT (HCC): Status: RESOLVED | Noted: 2023-10-31 | Resolved: 2025-05-07

## 2025-05-07 PROCEDURE — G0439 PPPS, SUBSEQ VISIT: HCPCS | Performed by: NURSE PRACTITIONER

## 2025-05-07 PROCEDURE — 99214 OFFICE O/P EST MOD 30 MIN: CPT | Performed by: NURSE PRACTITIONER

## 2025-05-07 PROCEDURE — G2211 COMPLEX E/M VISIT ADD ON: HCPCS | Performed by: NURSE PRACTITIONER

## 2025-05-07 RX ORDER — AMOXICILLIN 500 MG/1
TABLET, FILM COATED ORAL
COMMUNITY
Start: 2025-03-17

## 2025-05-07 NOTE — PROGRESS NOTES
Name: Stefan Loving      : 1945      MRN: [id number]  Encounter Provider: VEGA Sauer  Encounter Date: 2025   Encounter department: [facility]  :  Chief Complaint   Patient presents with    Medicare Wellness Visit     Subsequent visit      Health Maintenance   Topic Date Due    Medicare Annual Wellness Visit (AWV)  2025    Fall Risk  2026    Depression Screening  2026    Hepatitis C Screening  Completed    RSV Vaccine for Pregnant Patients and Patients Age 60+ Years  Completed    Zoster Vaccine  Completed    Pneumococcal Vaccine: 65+ Years  Completed    Influenza Vaccine  Completed    COVID-19 Vaccine  Completed    Meningococcal B Vaccine  Aged Out    RSV Vaccine age 0-20 Months  Aged Out    HIB Vaccine  Aged Out    IPV Vaccine  Aged Out    Hepatitis A Vaccine  Aged Out    Meningococcal ACWY Vaccine  Aged Out    HPV Vaccine  Aged Out    Colorectal Cancer Screening  Discontinued       Assessment & Plan  Medicare annual wellness visit, subsequent         Hypercholesterolemia  Recent lipid panel were reviewed.  Patient remains on Crestor.  Continue low-fat diet.    Component      Latest Ref Rng 2025   Cholesterol      See Comment mg/dL 142    Triglycerides      See Comment mg/dL 131    HDL      >=40 mg/dL 55    LDL Calculated      0 - 100 mg/dL 61          Orders:    Lipid Panel with Direct LDL reflex; Future    Stage 3a chronic kidney disease (HCC)  Lab Results   Component Value Date    EGFR 67 2025    EGFR 72 10/31/2024    EGFR 76 10/16/2024    CREATININE 1.05 2025    CREATININE 0.99 10/31/2024    CREATININE 0.95 10/16/2024   Kidney function stable.  Follows with cardiology.    Orders:    Comprehensive metabolic panel; Future    Hypokalemia  Continues on chlorthalidone and potassium supplementation.  Orders:    Comprehensive metabolic panel; Future    Benign essential hypertension  Blood pressure in the office today is 132/68.  This is  stable for the patient.  He continues on losartan 50 mg daily and chlorthalidone.  Orders:    CBC and differential; Future    Carotid stenosis, asymptomatic, bilateral    Patient follows with vascular.  Last carotid study was in September 2024.  Continue aspirin and Crestor.  Blood pressure controlled.  Repeat duplex studies per vascular recommendation.    9/6/2024  CONCLUSION:  Impression  RIGHT:  There is <50% stenosis noted in the internal carotid artery. Plaque is  heterogenous and irregular.  Vertebral artery flow is antegrade. There is no significant subclavian artery  disease.  LEFT:  There is <50% stenosis noted in the internal carotid artery. Plaque is  heterogenous and smooth.  Vertebral artery flow is antegrade. There is no significant subclavian artery  disease.     Compared to previous study on 9/5/2023, there is no evidence of left subclavian  artery stenosis         First degree AV block  Pacemaker placed.  Follows with cardiology.         BMI 33.0-33.9,adult  Lifestyle modifications discussed.  Patient is quite active.         Pacemaker  Pacemaker placed in October 2024 for heart block.  Patient feels well.  Continues to follow with cardiology.           Depression Screening and Follow-up Plan: Patient was screened for depression during today's encounter. They screened negative with a PHQ-2 score of 0.        Preventive health issues were discussed with patient, and age appropriate screening tests were ordered as noted in patient's After Visit Summary. Personalized health advice and appropriate referrals for health education or preventive services given if needed, as noted in patient's After Visit Summary.    History of Present Illness     Patient presents to the office today for annual wellness visit and follow-up.  Overall he feels well.  He continues to follow with cardiology and vascular.  Denies any cardiac symptoms.  Up-to-date with carotid vascular studies.  He had a pacemaker placed in  October 2024.  Recent blood work reviewed with patient.         Patient Care Team:  VEGA Reyes as PCP - General (Internal Medicine)  Keyon Muñoz MD (Cardiology)  VEGA Andres (Vascular Surgery)    Review of Systems   Constitutional: Negative.  Negative for fatigue.   HENT: Negative.  Negative for congestion, postnasal drip, rhinorrhea and trouble swallowing.    Eyes: Negative.  Negative for visual disturbance.   Respiratory: Negative.  Negative for choking and shortness of breath.    Cardiovascular: Negative.  Negative for chest pain.   Gastrointestinal: Negative.    Endocrine: Negative.    Genitourinary: Negative.    Musculoskeletal: Negative.  Negative for arthralgias, back pain, myalgias and neck pain.   Skin: Negative.    Neurological:  Negative for dizziness and headaches.   Psychiatric/Behavioral: Negative.       Medical History Reviewed by provider this encounter:  Tobacco  Allergies  Meds  Problems  Med Hx  Surg Hx  Fam Hx       Annual Wellness Visit Questionnaire   Stefan is here for his Subsequent Wellness visit. Last Medicare Wellness visit information reviewed, patient interviewed, no change since last AWV.     Health Risk Assessment:   Patient rates overall health as very good. Patient feels that their physical health rating is same. Patient is very satisfied with their life. Eyesight was rated as same. Hearing was rated as same. Patient feels that their emotional and mental health rating is same. Patients states they are never, rarely angry. Patient states they are sometimes unusually tired/fatigued. Pain experienced in the last 7 days has been some. Patient's pain rating has been 2/10. Patient states that he has experienced no weight loss or gain in last 6 months.     Depression Screening:   PHQ-2 Score: 0      Fall Risk Screening:   In the past year, patient has experienced: no history of falling in past year      Home Safety:  Patient does not have trouble with stairs  inside or outside of their home. Patient has working smoke alarms and has working carbon monoxide detector. Home safety hazards include: none.     Nutrition:   Current diet is Regular, Limited junk food and No Added Salt.     Medications:   Patient is currently taking over-the-counter supplements. OTC medications include: see medication list. Patient is able to manage medications.     Activities of Daily Living (ADLs)/Instrumental Activities of Daily Living (IADLs):   Walk and transfer into and out of bed and chair?: Yes  Dress and groom yourself?: Yes    Bathe or shower yourself?: Yes    Feed yourself? Yes  Do your laundry/housekeeping?: Yes  Manage your money, pay your bills and track your expenses?: Yes  Make your own meals?: Yes    Do your own shopping?: Yes    Previous Hospitalizations:   Any hospitalizations or ED visits within the last 12 months?: No      Advance Care Planning:   Living will: Yes    Durable POA for healthcare: Yes    Advanced directive: Yes    Five wishes given: No      Cognitive Screening:   Provider or family/friend/caregiver concerned regarding cognition?: No    Preventive Screenings      Cardiovascular Screening:    General: Screening Not Indicated and History Lipid Disorder      Diabetes Screening:     General: Screening Current      Prostate Cancer Screening:    General: Screening Not Indicated      Osteoporosis Screening:    General: Screening Not Indicated      Abdominal Aortic Aneurysm (AAA) Screening:        General: Screening Not Indicated      Lung Cancer Screening:     General: Screening Not Indicated      Hepatitis C Screening:    General: Screening Current    Screening, Brief Intervention, and Referral to Treatment (SBIRT)     Screening  Typical number of drinks in a day: 0  Typical number of drinks in a week: 0  Interpretation: Low risk drinking behavior.    Single Item Drug Screening:  How often have you used an illegal drug (including marijuana) or a prescription medication  "for non-medical reasons in the past year? never    Single Item Drug Screen Score: 0  Interpretation: Negative screen for possible drug use disorder    Social Drivers of Health     Financial Resource Strain: Low Risk  (4/18/2023)    Overall Financial Resource Strain (CARDIA)     Difficulty of Paying Living Expenses: Not hard at all   Food Insecurity: Patient Declined (5/6/2025)    Hunger Vital Sign     Worried About Running Out of Food in the Last Year: Patient declined     Ran Out of Food in the Last Year: Patient declined   Transportation Needs: No Transportation Needs (5/6/2025)    PRAPARE - Transportation     Lack of Transportation (Medical): No     Lack of Transportation (Non-Medical): No   Housing Stability: Patient Declined (5/6/2025)    Housing Stability Vital Sign     Unable to Pay for Housing in the Last Year: Patient declined     Homeless in the Last Year: Patient declined   Utilities: Patient Declined (5/6/2025)    [facility] Utilities     Threatened with loss of utilities: Patient declined     No results found.    Objective   /68   Pulse 94   Temp 97.6 °F (36.4 °C) (Tympanic)   Resp 18   Ht 5' 7\" (1.702 m)   Wt 98 kg (216 lb)   SpO2 97%   BMI 33.83 kg/m²     Physical Exam  Vitals reviewed.   Constitutional:       Appearance: Normal appearance.   HENT:      Head: Normocephalic and atraumatic.      Nose: Nose normal.      Mouth/Throat:      Mouth: Mucous membranes are moist.   Eyes:      Extraocular Movements: Extraocular movements intact.      Pupils: Pupils are equal, round, and reactive to light.   Cardiovascular:      Rate and Rhythm: Normal rate and regular rhythm.      Pulses: Normal pulses.      Heart sounds: Normal heart sounds.   Pulmonary:      Effort: Pulmonary effort is normal.      Breath sounds: Normal breath sounds.   Musculoskeletal:         General: Normal range of motion.   Skin:     General: Skin is warm.      Capillary Refill: Capillary refill takes less than 2 seconds. "   Neurological:      General: No focal deficit present.      Mental Status: He is alert and oriented to person, place, and time. Mental status is at baseline.   Psychiatric:         Mood and Affect: Mood normal.         Behavior: Behavior normal.         Thought Content: Thought content normal.         Judgment: Judgment normal.

## 2025-05-07 NOTE — ASSESSMENT & PLAN NOTE
Pacemaker placed in October 2024 for heart block.  Patient feels well.  Continues to follow with cardiology.

## 2025-05-07 NOTE — ASSESSMENT & PLAN NOTE
Blood pressure in the office today is 132/68.  This is stable for the patient.  He continues on losartan 50 mg daily and chlorthalidone.  Orders:    CBC and differential; Future

## 2025-05-07 NOTE — ASSESSMENT & PLAN NOTE
Patient follows with vascular.  Last carotid study was in September 2024.  Continue aspirin and Crestor.  Blood pressure controlled.  Repeat duplex studies per vascular recommendation.    9/6/2024  CONCLUSION:  Impression  RIGHT:  There is <50% stenosis noted in the internal carotid artery. Plaque is  heterogenous and irregular.  Vertebral artery flow is antegrade. There is no significant subclavian artery  disease.  LEFT:  There is <50% stenosis noted in the internal carotid artery. Plaque is  heterogenous and smooth.  Vertebral artery flow is antegrade. There is no significant subclavian artery  disease.     Compared to previous study on 9/5/2023, there is no evidence of left subclavian  artery stenosis

## 2025-05-07 NOTE — ASSESSMENT & PLAN NOTE
Continues on chlorthalidone and potassium supplementation.  Orders:    Comprehensive metabolic panel; Future

## 2025-05-07 NOTE — PATIENT INSTRUCTIONS
Medicare Preventive Visit Patient Instructions  Thank you for completing your Welcome to Medicare Visit or Medicare Annual Wellness Visit today. Your next wellness visit will be due in one year (5/8/2026).  The screening/preventive services that you may require over the next 5-10 years are detailed below. Some tests may not apply to you based off risk factors and/or age. Screening tests ordered at today's visit but not completed yet may show as past due. Also, please note that scanned in results may not display below.  Preventive Screenings:  Service Recommendations Previous Testing/Comments   Colorectal Cancer Screening  Colonoscopy    Fecal Occult Blood Test (FOBT)/Fecal Immunochemical Test (FIT)  Fecal DNA/Cologuard Test  Flexible Sigmoidoscopy Age: 45-75 years old   Colonoscopy: every 10 years (May be performed more frequently if at higher risk)  OR  FOBT/FIT: every 1 year  OR  Cologuard: every 3 years  OR  Sigmoidoscopy: every 5 years  Screening may be recommended earlier than age 45 if at higher risk for colorectal cancer. Also, an individualized decision between you and your healthcare provider will decide whether screening between the ages of 76-85 would be appropriate. Colonoscopy: 08/08/2014  FOBT/FIT: Not on file  Cologuard: Not on file  Sigmoidoscopy: Not on file          Prostate Cancer Screening Individualized decision between patient and health care provider in men between ages of 55-69   Medicare will cover every 12 months beginning on the day after your 50th birthday PSA: 3.767 ng/mL           Hepatitis C Screening Once for adults born between 1945 and 1965  More frequently in patients at high risk for Hepatitis C Hep C Antibody: 02/28/2019        Diabetes Screening 1-2 times per year if you're at risk for diabetes or have pre-diabetes Fasting glucose: 110 mg/dL (4/1/2025)  A1C: 5.1 (3/31/2022)      Cholesterol Screening Once every 5 years if you don't have a lipid disorder. May order more often  based on risk factors. Lipid panel: 04/01/2025         Other Preventive Screenings Covered by Medicare:  Abdominal Aortic Aneurysm (AAA) Screening: covered once if your at risk. You're considered to be at risk if you have a family history of AAA or a male between the age of 65-75 who smoking at least 100 cigarettes in your lifetime.  Lung Cancer Screening: covers low dose CT scan once per year if you meet all of the following conditions: (1) Age 55-77; (2) No signs or symptoms of lung cancer; (3) Current smoker or have quit smoking within the last 15 years; (4) You have a tobacco smoking history of at least 20 pack years (packs per day x number of years you smoked); (5) You get a written order from a healthcare provider.  Glaucoma Screening: covered annually if you're considered high risk: (1) You have diabetes OR (2) Family history of glaucoma OR (3)  aged 50 and older OR (4)  American aged 65 and older  Osteoporosis Screening: covered every 2 years if you meet one of the following conditions: (1) Have a vertebral abnormality; (2) On glucocorticoid therapy for more than 3 months; (3) Have primary hyperparathyroidism; (4) On osteoporosis medications and need to assess response to drug therapy.  HIV Screening: covered annually if you're between the age of 15-65. Also covered annually if you are younger than 15 and older than 65 with risk factors for HIV infection. For pregnant patients, it is covered up to 3 times per pregnancy.    Immunizations:  Immunization Recommendations   Influenza Vaccine Annual influenza vaccination during flu season is recommended for all persons aged >= 6 months who do not have contraindications   Pneumococcal Vaccine   * Pneumococcal conjugate vaccine = PCV13 (Prevnar 13), PCV15 (Vaxneuvance), PCV20 (Prevnar 20)  * Pneumococcal polysaccharide vaccine = PPSV23 (Pneumovax) Adults 19-65 yo with certain risk factors or if 65+ yo  If never received any pneumonia vaccine:  recommend Prevnar 20 (PCV20)  Give PCV20 if previously received 1 dose of PCV13 or PPSV23   Hepatitis B Vaccine 3 dose series if at intermediate or high risk (ex: diabetes, end stage renal disease, liver disease)   Respiratory syncytial virus (RSV) Vaccine - COVERED BY MEDICARE PART D  * RSVPreF3 (Arexvy) CDC recommends that adults 60 years of age and older may receive a single dose of RSV vaccine using shared clinical decision-making (SCDM)   Tetanus (Td) Vaccine - COST NOT COVERED BY MEDICARE PART B Following completion of primary series, a booster dose should be given every 10 years to maintain immunity against tetanus. Td may also be given as tetanus wound prophylaxis.   Tdap Vaccine - COST NOT COVERED BY MEDICARE PART B Recommended at least once for all adults. For pregnant patients, recommended with each pregnancy.   Shingles Vaccine (Shingrix) - COST NOT COVERED BY MEDICARE PART B  2 shot series recommended in those 19 years and older who have or will have weakened immune systems or those 50 years and older     Health Maintenance Due:      Topic Date Due   • Hepatitis C Screening  Completed   • Colorectal Cancer Screening  Discontinued     Immunizations Due:  There are no preventive care reminders to display for this patient.  Advance Directives   What are advance directives?  Advance directives are legal documents that state your wishes and plans for medical care. These plans are made ahead of time in case you lose your ability to make decisions for yourself. Advance directives can apply to any medical decision, such as the treatments you want, and if you want to donate organs.   What are the types of advance directives?  There are many types of advance directives, and each state has rules about how to use them. You may choose a combination of any of the following:  Living will:  This is a written record of the treatment you want. You can also choose which treatments you do not want, which to limit, and  which to stop at a certain time. This includes surgery, medicine, IV fluid, and tube feedings.   Durable power of  for healthcare (DPAHC):  This is a written record that states who you want to make healthcare choices for you when you are unable to make them for yourself. This person, called a proxy, is usually a family member or a friend. You may choose more than 1 proxy.  Do not resuscitate (DNR) order:  A DNR order is used in case your heart stops beating or you stop breathing. It is a request not to have certain forms of treatment, such as CPR. A DNR order may be included in other types of advance directives.  Medical directive:  This covers the care that you want if you are in a coma, near death, or unable to make decisions for yourself. You can list the treatments you want for each condition. Treatment may include pain medicine, surgery, blood transfusions, dialysis, IV or tube feedings, and a ventilator (breathing machine).  Values history:  This document has questions about your views, beliefs, and how you feel and think about life. This information can help others choose the care that you would choose.  Why are advance directives important?  An advance directive helps you control your care. Although spoken wishes may be used, it is better to have your wishes written down. Spoken wishes can be misunderstood, or not followed. Treatments may be given even if you do not want them. An advance directive may make it easier for your family to make difficult choices about your care.   Weight Management   Why it is important to manage your weight:  Being overweight increases your risk of health conditions such as heart disease, high blood pressure, type 2 diabetes, and certain types of cancer. It can also increase your risk for osteoarthritis, sleep apnea, and other respiratory problems. Aim for a slow, steady weight loss. Even a small amount of weight loss can lower your risk of health problems.  How to lose  weight safely:  A safe and healthy way to lose weight is to eat fewer calories and get regular exercise. You can lose up about 1 pound a week by decreasing the number of calories you eat by 500 calories each day.   Healthy meal plan for weight management:  A healthy meal plan includes a variety of foods, contains fewer calories, and helps you stay healthy. A healthy meal plan includes the following:  Eat whole-grain foods more often.  A healthy meal plan should contain fiber. Fiber is the part of grains, fruits, and vegetables that is not broken down by your body. Whole-grain foods are healthy and provide extra fiber in your diet. Some examples of whole-grain foods are whole-wheat breads and pastas, oatmeal, brown rice, and bulgur.  Eat a variety of vegetables every day.  Include dark, leafy greens such as spinach, kale, nevin greens, and mustard greens. Eat yellow and orange vegetables such as carrots, sweet potatoes, and winter squash.   Eat a variety of fruits every day.  Choose fresh or canned fruit (canned in its own juice or light syrup) instead of juice. Fruit juice has very little or no fiber.  Eat low-fat dairy foods.  Drink fat-free (skim) milk or 1% milk. Eat fat-free yogurt and low-fat cottage cheese. Try low-fat cheeses such as mozzarella and other reduced-fat cheeses.  Choose meat and other protein foods that are low in fat.  Choose beans or other legumes such as split peas or lentils. Choose fish, skinless poultry (chicken or turkey), or lean cuts of red meat (beef or pork). Before you cook meat or poultry, cut off any visible fat.   Use less fat and oil.  Try baking foods instead of frying them. Add less fat, such as margarine, sour cream, regular salad dressing and mayonnaise to foods. Eat fewer high-fat foods. Some examples of high-fat foods include french fries, doughnuts, ice cream, and cakes.  Eat fewer sweets.  Limit foods and drinks that are high in sugar. This includes candy, cookies,  regular soda, and sweetened drinks.  Exercise:  Exercise at least 30 minutes per day on most days of the week. Some examples of exercise include walking, biking, dancing, and swimming. You can also fit in more physical activity by taking the stairs instead of the elevator or parking farther away from stores. Ask your healthcare provider about the best exercise plan for you.      © Copyright [employer] 2018 Information is for End User's use only and may not be sold, redistributed or otherwise used for commercial purposes. All illustrations and images included in CareNotes® are the copyrighted property of [employer]., Inc[employer] or [employer]

## 2025-05-07 NOTE — ASSESSMENT & PLAN NOTE
Recent lipid panel were reviewed.  Patient remains on Crestor.  Continue low-fat diet.    Component      Latest Ref Rng 4/1/2025   Cholesterol      See Comment mg/dL 142    Triglycerides      See Comment mg/dL 131    HDL      >=40 mg/dL 55    LDL Calculated      0 - 100 mg/dL 61          Orders:    Lipid Panel with Direct LDL reflex; Future

## 2025-05-07 NOTE — ASSESSMENT & PLAN NOTE
Lab Results   Component Value Date    EGFR 67 04/01/2025    EGFR 72 10/31/2024    EGFR 76 10/16/2024    CREATININE 1.05 04/01/2025    CREATININE 0.99 10/31/2024    CREATININE 0.95 10/16/2024   Kidney function stable.  Follows with cardiology.    Orders:    Comprehensive metabolic panel; Future

## 2025-05-21 ENCOUNTER — REMOTE DEVICE CLINIC VISIT (OUTPATIENT)
Dept: CARDIOLOGY CLINIC | Facility: CLINIC | Age: 80
End: 2025-05-21
Payer: MEDICARE

## 2025-05-21 ENCOUNTER — TELEPHONE (OUTPATIENT)
Dept: CARDIOLOGY CLINIC | Facility: CLINIC | Age: 80
End: 2025-05-21

## 2025-05-21 ENCOUNTER — RESULTS FOLLOW-UP (OUTPATIENT)
Dept: CARDIOLOGY CLINIC | Facility: CLINIC | Age: 80
End: 2025-05-21

## 2025-05-21 DIAGNOSIS — Z95.0 CARDIAC PACEMAKER IN SITU: Primary | ICD-10-CM

## 2025-05-21 PROCEDURE — 93294 REM INTERROG EVL PM/LDLS PM: CPT | Performed by: INTERNAL MEDICINE

## 2025-05-21 PROCEDURE — 93296 REM INTERROG EVL PM/IDS: CPT | Performed by: INTERNAL MEDICINE

## 2025-05-21 NOTE — PROGRESS NOTES
Results for orders placed or performed in visit on 05/21/25   Cardiac EP device report    Narrative    MDT DUAL PM/ACTIVE SYSTEM IS MRI CONDITIONAL  CARELINK TRANSMISSION: BATTERY VOLTAGE ADEQUATE (11.8 YRS). AP-77%, -94% (>40% MVPR@60PPM). ALL LEAD PARAMETERS WITHIN NORMAL LIMITS. 2 NSVT EPISODES ON 5/18/25- MULTIPLE BRIEF NSVT ON EGM'S FOR 3- 9 BEATS UP  BPM. NO EF FOUND IN CHART. 35 AF EPISODES, AVG HR- 60-84 BPM, MAX DURATION 12.1 MINS. AF BURDEN SINCE 3/5/25-0.1%. PT ON ELIQUIS; NO BETA BLOCKER. PVC SINGLES COUNT SINCE 3/5/25- 0.4/H. TASKED  PT. NORMAL DEVICE FUNCTION. GV        
[[email or web address]] : Pt saw ENT on 9/6/18 regarding right lateral neck swelling. Pt was prescribed omnicef 300mg BID. The antibiotic didn't agree with him. Pt had a CT-neck which showed inflammation without any discreet abscess. Pt saw a dermatologist  a few days ago. The antibiotic was changed to doxycycline 100mg BID which he is tolerating. Pt saw dermatology again today and was told to continue oral doxycycline and was prescribed prednisone for 5 days along with a topical steroid cream.

## 2025-05-21 NOTE — TELEPHONE ENCOUNTER
Spoke with the patient regarding his pacer check findings as per Dr. Muñoz. He is in agreement to start Metoprolol. Please send script to [facility].

## 2025-05-21 NOTE — TELEPHONE ENCOUNTER
----- Message from Keyon Muñoz MD sent at 5/21/2025  2:03 PM EDT -----  Regarding: FW: nsvt on pacemaker  Patient's pacemaker is showing some abnormal heart rhythms and several episodes of atrial fibrillation.  I would like to start metoprolol for him if he is agreeable.  He should keep an eye on his blood pressure after starting metoprolol.  ----- Message -----  From: Shira Humphries RN  Sent: 5/21/2025  12:08 PM EDT  To: Keyon Muñoz MD  Subject: nsvt on pacemaker                                FYI on remote pm transmission:2 NSVT EPISODES ON 5/18/25- MULTIPLE BRIEF NSVT ON EGM'S FOR 3- 9 BEATS UP  BPM. NO EF FOUND IN CHART. 35 AF EPISODES, AVG HR- 60-84 BPM, MAX DURATION 12.1 MINS. AF BURDEN SINCE 3/5/25-0.1%. PT ON ELIQUIS; NO BETA BLOCKER. PVC SINGLES COUNT SINCE 3/5/25- 0.4/H.Full report in chart for your review. Next appt w/ you is on 8/14/25.Thanks,Corina

## 2025-05-22 DIAGNOSIS — I48.0 PAROXYSMAL ATRIAL FIBRILLATION (HCC): Primary | ICD-10-CM

## 2025-05-22 DIAGNOSIS — I47.29 NONSUSTAINED VENTRICULAR TACHYCARDIA (HCC): ICD-10-CM

## 2025-05-22 RX ORDER — METOPROLOL SUCCINATE 25 MG/1
25 TABLET, EXTENDED RELEASE ORAL DAILY
Qty: 90 TABLET | Refills: 3 | Status: SHIPPED | OUTPATIENT
Start: 2025-05-22

## 2025-05-22 NOTE — TELEPHONE ENCOUNTER
I called the patient and spoke with his wife regarding the echo order. Central scheduling number was provided.

## 2025-05-22 NOTE — PROGRESS NOTES
Patient's pacer showed several episodes of atrial fibrillation and 2 brief episodes of nonsustained ventricular tachycardia.  Will order an echocardiogram and start metoprolol.  Patient was advised to monitor blood pressure after starting metoprolol in case it makes him hypotensive.  In which case we will discontinue chlorthalidone.

## 2025-05-22 NOTE — TELEPHONE ENCOUNTER
----- Message from Keyon Muñoz MD sent at 5/22/2025  1:54 PM EDT -----  Sorry, I just realized that he does not have an echo in our system.  I will also order an echo for him which he should have done prior to his upcoming visit and will send the metoprolol to his   pharmacy  ----- Message -----  From: Juwan Corcoran MD  Sent: 5/21/2025   6:37 PM EDT  To: MD Keyon Colindres  - looks like you have appt coming up. Its showing rate controlled Afib. Maybe just needs an echo. Will you talk to him?     Thanks.     ----- Message -----  From: Interface, [place] Results Incoming  Sent: 5/21/2025  12:07 PM EDT  To: Juwan Corcoran MD

## 2025-07-02 ENCOUNTER — HOSPITAL ENCOUNTER (OUTPATIENT)
Dept: NON INVASIVE DIAGNOSTICS | Facility: CLINIC | Age: 80
Discharge: HOME | End: 2025-07-02
Attending: INTERNAL MEDICINE
Payer: MEDICARE

## 2025-07-02 ENCOUNTER — RESULTS FOLLOW-UP (OUTPATIENT)
Dept: CARDIOLOGY CLINIC | Facility: CLINIC | Age: 80
End: 2025-07-02

## 2025-07-02 VITALS
BODY MASS INDEX: 33.9 KG/M2 | WEIGHT: 216 LBS | DIASTOLIC BLOOD PRESSURE: 68 MMHG | HEIGHT: 67 IN | SYSTOLIC BLOOD PRESSURE: 132 MMHG | HEART RATE: 75 BPM

## 2025-07-02 DIAGNOSIS — I47.29 NONSUSTAINED VENTRICULAR TACHYCARDIA (HCC): ICD-10-CM

## 2025-07-02 DIAGNOSIS — I48.0 PAROXYSMAL ATRIAL FIBRILLATION (HCC): ICD-10-CM

## 2025-07-02 LAB
AORTIC ROOT: 3.7 CM
BSA FOR ECHO PROCEDURE: 2.09 M2
E WAVE DECELERATION TIME: 275 MS
E/A RATIO: 0.71
FRACTIONAL SHORTENING: 30 (ref 28–44)
INTERVENTRICULAR SEPTUM IN DIASTOLE (PARASTERNAL SHORT AXIS VIEW): 1 CM
INTERVENTRICULAR SEPTUM: 1 CM (ref 0.6–1.1)
LAAS-AP2: 20.7 CM2
LAAS-AP4: 26.4 CM2
LEFT ATRIUM SIZE: 4.8 CM
LEFT ATRIUM VOLUME (MOD BIPLANE): 69 ML
LEFT ATRIUM VOLUME INDEX (MOD BIPLANE): 33 ML/M2
LEFT INTERNAL DIMENSION IN SYSTOLE: 3 CM (ref 2.1–4)
LEFT VENTRICULAR INTERNAL DIMENSION IN DIASTOLE: 4.3 CM (ref 3.5–6)
LEFT VENTRICULAR POSTERIOR WALL IN END DIASTOLE: 1 CM
LEFT VENTRICULAR STROKE VOLUME: 48 ML
LV EF US.2D.A4C+ESTIMATED: 56 %
LVSV (TEICH): 48 ML
MV E'TISSUE VEL-LAT: 9 CM/S
MV E'TISSUE VEL-SEP: 7 CM/S
MV PEAK A VEL: 0.94 M/S
MV PEAK E VEL: 67 CM/S
MV STENOSIS PRESSURE HALF TIME: 80 MS
MV VALVE AREA P 1/2 METHOD: 2.75
RA PRESSURE ESTIMATED: 3 MMHG
RIGHT ATRIUM AREA SYSTOLE A4C: 31.7 CM2
RIGHT VENTRICLE ID DIMENSION: 4.8 CM
RV PSP: 32 MMHG
SL CV LEFT ATRIUM LENGTH A2C: 6 CM
SL CV LV EF: 65
SL CV PED ECHO LEFT VENTRICLE DIASTOLIC VOLUME (MOD BIPLANE) 2D: 82 ML
SL CV PED ECHO LEFT VENTRICLE SYSTOLIC VOLUME (MOD BIPLANE) 2D: 34 ML
TR MAX PG: 29 MMHG
TR PEAK VELOCITY: 2.7 M/S
TRICUSPID ANNULAR PLANE SYSTOLIC EXCURSION: 2.1 CM
TRICUSPID VALVE PEAK REGURGITATION VELOCITY: 2.7 M/S

## 2025-07-02 PROCEDURE — 93306 TTE W/DOPPLER COMPLETE: CPT | Performed by: INTERNAL MEDICINE

## 2025-07-02 PROCEDURE — 93306 TTE W/DOPPLER COMPLETE: CPT

## 2025-07-02 NOTE — TELEPHONE ENCOUNTER
----- Message from Keyon Muñoz MD sent at 7/2/2025  1:54 PM EDT -----  Normal heart function.  He should keep his follow-up with me in August.  ----- Message -----  From: David Garza MD  Sent: 7/2/2025  11:54 AM EDT  To: Keyon Muñoz MD    I called the patient with results.

## 2025-08-14 ENCOUNTER — OFFICE VISIT (OUTPATIENT)
Dept: CARDIOLOGY CLINIC | Facility: CLINIC | Age: 80
End: 2025-08-14
Payer: MEDICARE

## 2025-08-18 ENCOUNTER — TELEPHONE (OUTPATIENT)
Age: 80
End: 2025-08-18

## 2025-08-20 ENCOUNTER — REMOTE DEVICE CLINIC VISIT (OUTPATIENT)
Dept: CARDIOLOGY CLINIC | Facility: CLINIC | Age: 80
End: 2025-08-20
Payer: MEDICARE

## 2025-08-20 DIAGNOSIS — R55 SYNCOPE AND COLLAPSE: ICD-10-CM

## 2025-08-20 DIAGNOSIS — I44.30 AVB (ATRIOVENTRICULAR BLOCK): Primary | ICD-10-CM

## 2025-08-21 PROCEDURE — 93294 REM INTERROG EVL PM/LDLS PM: CPT | Performed by: INTERNAL MEDICINE

## 2025-08-21 PROCEDURE — 93296 REM INTERROG EVL PM/IDS: CPT | Performed by: INTERNAL MEDICINE

## (undated) DEVICE — DRESSING AQUACEL AG 3.5 X 6 IN

## (undated) DEVICE — INTRO SHEATH PEEL AWAY 7FR

## (undated) DEVICE — INTRO SHEATH  PEEL AWAY 7FR 15CM

## (undated) DEVICE — DGW .035 FC J3MM 150CM TEF: Brand: EMERALD

## (undated) DEVICE — CATH GUIDING FIXED SHAPE 43CM

## (undated) DEVICE — SLITTER ADJUSTABLE

## (undated) DEVICE — MICROPUNCTURE INTRODUCER SET SILHOUETTE TRANSITIONLESS PUSH-PLUS DESIGN - STIFFENED CANNULA WITH NITINOL WIRE GUIDE: Brand: MICROPUNCTURE